# Patient Record
Sex: FEMALE | Race: WHITE | Employment: FULL TIME | URBAN - METROPOLITAN AREA
[De-identification: names, ages, dates, MRNs, and addresses within clinical notes are randomized per-mention and may not be internally consistent; named-entity substitution may affect disease eponyms.]

---

## 2017-08-14 ENCOUNTER — TELEPHONE (OUTPATIENT)
Dept: OBGYN CLINIC | Age: 32
End: 2017-08-14

## 2017-08-28 ENCOUNTER — OFFICE VISIT (OUTPATIENT)
Dept: OBGYN CLINIC | Age: 32
End: 2017-08-28

## 2017-08-28 VITALS
BODY MASS INDEX: 44.1 KG/M2 | DIASTOLIC BLOOD PRESSURE: 70 MMHG | WEIGHT: 281 LBS | HEIGHT: 67 IN | SYSTOLIC BLOOD PRESSURE: 118 MMHG

## 2017-08-28 DIAGNOSIS — Z32.01 POSITIVE PREGNANCY TEST: ICD-10-CM

## 2017-08-28 DIAGNOSIS — Z34.00 PRENATAL CARE OF PRIMIGRAVIDA, ANTEPARTUM: Primary | ICD-10-CM

## 2017-08-28 DIAGNOSIS — Z13.79 GENETIC TESTING: ICD-10-CM

## 2017-08-28 DIAGNOSIS — N92.6 MISSED MENSES: ICD-10-CM

## 2017-08-28 DIAGNOSIS — O99.210 OBESITY IN PREGNANCY: ICD-10-CM

## 2017-08-28 RX ORDER — PYRIDOXINE HCL (VITAMIN B6) 100 MG
100 TABLET ORAL DAILY
COMMUNITY
End: 2017-11-21

## 2017-08-28 RX ORDER — EPINEPHRINE 0.3 MG/.3ML
0.3 INJECTION SUBCUTANEOUS
COMMUNITY
End: 2017-12-22

## 2017-08-28 NOTE — PROGRESS NOTES
[facility]  [email or web address]  [phone]    Lena Francisco MD, [address]     Chief complaint: (NEW PATIENT)  Irregular cycles  Last cycle; Patient's last menstrual period was 2017 (exact date). This is a new concern and an evaluation is planned. Current pregnancy history:  Domenico Victoria is a , 29 y.o. female [facility]   She presents for the evaluation of irregular menses and a positive pregnancy test.    LMP history:  Patient's last menstrual period was 2017 (exact date). .  The date of the beginning of her last menstrual period is certain. Her menses are regular. Her cycles occur about every 8.3 weeks. A urine pregnancy test was positive about few weeks ago. She was not using contraception at the estimated time of conception. Based on her LMP, her EGA is 8 weeks,3 days with and EDC of 418. Ultrasound data:  She had an ultrasound today which revealed a viable cuello pregnancy with a gestational age of 10 weeks and 0 days giving an EDC of 18. TA ULTRASOUND PERFORMED. A SINGLE VIABLE 9W0D IUP IS SEEN WITH NORMAL CARDIAC RHYTHM. GESTATIONAL AGE BASED ON TODAYS US.  A NORMAL APPEARING YOLK [address] IS SEEN. RIGHT & LEFT OVARIES APPEAR WITHIN NORMAL LIMITS. NO FREE FLUID SEEN IN THE CDS. Pregnancy symptoms:  She reports \"morning sickness\". She denies frequent urination. Since she found out she is pregnant, she has there was no change in patient's weight. She reports her prepregnancy weight as 281 pounds. Relevant past pregnancy history:  She has the following pregnancy history:none. She does not have a history of  delivery. She does not have a history of a prior  section. 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     Substance history:   She does not report current tobacco use. She does not report current alcohol use. She does not report current drug use. Exposure history: There are not indoor cat(s) in the home. The patient was instructed not to change cat litter boxes during pregnancy. She does not report close contact with children on a regular basis. She has chicken pox or the vaccine in the past.   Patient does not report issues with domestic violence. 30  Genetic Screening/Teratology Counseling:   (Includes patient, baby's father, or anyone in either family with:)  3.  Patient's age >/= 28 at EDC?--32      FOB age: 29years old. 2.  Thalassemia ([facility], [place], [facility], or  background): MCV<80?--yes and Klaus   3. Neural tube defect (meningomyelocele, spina bifida, anencephaly)? --no  4. Congenital heart defect?--no  5. Down syndrome?--no  6. Jack-Sachs ([facility])? --no  7. Canavan's Disease?--no  8. Familial Dysautonomia?--no   9. Sickle cell disease or trait ()? --no   Has she been tested for sickle trait: No  10. Hemophilia or other blood disorders?--yes and hemochromatosis FOB grandfather paternal  6. Muscular dystrophy?--no  12. Cystic fibrosis? --no  13. [place]'s Chorea?--no  14. Mental retardation/autism (if yes was person tested for Fragile X)?-no  15. Other inherited genetic or chromosomal disorder?- no  16. Maternal metabolic disorder (DM, PKU, etc)? --no  17. Patient or FOB with a child with a birth defect not listed above?--no  17a. Patient or FOB with a birth defect themselves?--no  25. Recurrent pregnancy loss, or stillbirth?--no  19. Any medications since LMP other than prenatal vitamins (include vitamins, supplements, OTC meds, drugs, alcohol)? -- PNV, B6, unisom, claritin    Current Outpatient Prescriptions:     PRENATAL VITS W-CA,FE,FA,1 MG, (PRENATAL 1 + IRON PO), Take  by mouth., Disp: , Rfl:     doxylamine succinate (UNISOM) 25 mg tablet, Take 25 mg by mouth nightly as needed for Sleep., Disp: , Rfl:     pyridoxine, vitamin B6, (VITAMIN B-6) 100 mg tablet, Take 100 mg by mouth daily. , Disp: , Rfl:     EPINEPHrine (EPIPEN) 0.3 mg/0.3 mL injection, 0.3 mg by IntraMUSCular route once as needed. , Disp: , Rfl:     20. Any other genetic/environmental exposure to discuss?--no. Infection History:  1. Lives with someone with TB or TB exposed?--no  2. Patient or partner has history of genital herpes?--no  3. Rash or viral illness since LMP?--no  4. History of STD (GC, CT, HPV, syphilis, HIV)? --no  5. Have you received a flu vaccine for the most recent flu season? -- no  6. Have you or your sexual partner(s) travelled to a [facility] area in the last 6 months? -- no    Past Medical History:   Diagnosis Date    Routine Papanicolaou smear     Negative per pt. Past Surgical History:   Procedure Laterality Date    HX WISDOM TEETH EXTRACTION  2017     Social History     Occupational History    Not on file. Social History Main Topics    Smoking status: Never Smoker    Smokeless tobacco: Never Used      Comment: Never used vapor or e-cigs     Alcohol use No    Drug use: No    Sexual activity: Yes     Partners: Male     Birth control/ protection: None     Family History   Problem Relation Age of Onset    Colon Cancer Maternal Grandfather     Diabetes Paternal Grandmother      OB History    Para Term  AB Living   1        SAB TAB Ectopic Molar Multiple Live Births              # Outcome Date GA Lbr Antwan/2nd Weight Sex Delivery Anes PTL Lv   1 Current                 Allergies   Allergen Reactions    Clayton Anaphylaxis     Prior to Admission medications    Medication Sig Start Date End Date Taking? Authorizing Provider   PRENATAL VITS W-CA,FE,FA,1 MG, (PRENATAL 1 + IRON PO) Take  by mouth. Yes Historical Provider   doxylamine succinate (UNISOM) 25 mg tablet Take 25 mg by mouth nightly as needed for Sleep. Yes Historical Provider   pyridoxine, vitamin B6, (VITAMIN B-6) 100 mg tablet Take 100 mg by mouth daily. Yes Historical Provider   EPINEPHrine (EPIPEN) 0.3 mg/0.3 mL injection 0.3 mg by IntraMUSCular route once as needed.    Yes Historical Provider        Review of Systems - History obtained from the patient  Constitutional: negative for weight loss, fever, night sweats  HEENT: negative for hearing loss, earache, congestion, snoring, sorethroat  CV: negative for chest pain, palpitations, edema  Resp: negative for cough, shortness of breath, wheezing  GI: negative for change in bowel habits, abdominal pain, black or bloody stools  : negative for frequency, dysuria, hematuria, vaginal discharge  MSK: negative for back pain, joint pain, muscle pain  Breast: negative for breast lumps, nipple discharge, galactorrhea  Skin :negative for itching, rash, hives  Neuro: negative for dizziness, headache, confusion, weakness  Psych: negative for anxiety, depression, change in mood  Heme/lymph: negative for bleeding, bruising, pallor    Objective:  Visit Vitals    /70    Ht 5' 7\" (1.702 m)    Wt 281 lb (127.5 kg)    LMP 06/30/2017 (Exact Date)    BMI 44.01 kg/m2       Physical Exam:   Constitutional  · Appearance: well-nourished, well developed, alert, in no acute distress    HENT  · Head  · Face: appears normal  · Eyes: appear normal  · Ears: normal  · Mouth: normal  · Lips: no lesions    Neck  · Inspection/Palpation: normal appearance, no masses or tenderness  · Lymph Nodes: no lymphadenopathy present  · Thyroid: gland size normal, nontender, no nodules or masses present on palpation    Chest  · Respiratory Effort: breathing unlabored  · Auscultation: normal breath sounds    Cardiovascular  · Heart:  · Auscultation: regular rate and rhythm without murmur    Breasts  · Inspection of Breasts: breasts symmetrical, no skin changes, no discharge present, nipple appearance normal, no skin retraction present  · Palpation of Breasts and Axillae: no masses present on palpation, no breast tenderness  · Axillary Lymph Nodes: no lymphadenopathy present    Gastrointestinal  · Abdominal Examination: abdomen non-tender to palpation, normal bowel sounds, no masses present  · Liver and spleen: no hepatomegaly present, spleen not palpable  · Hernias: no hernias identified    Genitourinary  · External Genitalia: normal appearance for age, no discharge present, no tenderness present, no inflammatory lesions present, no masses present, no atrophy present  · Vagina: normal vaginal vault without central or paravaginal defects, no discharge present, no inflammatory lesions present, no masses present  · Bladder: non-tender to palpation  · Urethra: appears normal  · Cervix: normal appearing with discharge or lesions, os closed  · Uterus: enlarged, normal shape, soft  · Adnexa: no adnexal tenderness present, no adnexal masses present  · Perineum: perineum within normal limits, no evidence of trauma, no rashes or skin lesions present  · Anus: anus within normal limits, no hemorrhoids present  · Inguinal Lymph Nodes: no lymphadenopathy present    Skin  · General Inspection: no rash, no lesions identified    Neurologic/Psychiatric  · Mental Status:  · Orientation: grossly oriented to person, place and time  · Mood and Affect: mood normal, affect appropriate    Assessment:   Irregular cycles  Encounter Diagnoses   Name Primary?  Positive pregnancy test     Missed menses     Genetic testing     Obesity in pregnancy     Prenatal care of primigravida, antepartum Yes   FH hemochromatosis  Due date: LMP    Plan:   We discussed options of genetic screening and diagnostic testing including:  CF testing, CVS, amniocentesis first trimester screening/NT, MSAFP, and NIPT (handout given to patient for review and consent)  She is interested in prenatal genetic testing of her fetus.  She is considering NT: but will notify us if decides to cancel this test  Plan: NT  The course of pregnancy discussed including visit schedule, ultrasounds, lab testing, etc.  Pt advised to avoid alcoholic beverages and illicit/recreational drugs use  Recommend taking prenatal vitamins or folic acid daily with DHA/fish oil. The hospital and practice style discussed with coverage system. We discussed nutrition, toxoplasmosis precautions, sexual activity, exercise, need for influenza vaccine, environmental and work hazards, travel advice, screen for domestic violence, need for seat belts. We discussed seafood, unpasteurized dairy products, deli meat, artificial sweeteners, and caffeine intake. We recommend avoiding chemical and toxin exposures when possible. Information on prenatal and breastfeeding classes given. Information on circumcision given  Patient encouraged not to smoke. Discussed current prescription drug use. Given medication list.  Discussed the use of over the counter medications and chemicals. She is advised to contact her MD with any questions. Pt understands risk of hemorrhage during pregnancy and post delivery and would accept blood products if necessary in life-threatening emergencies  We discussed signs and symptoms of abnormal pregnancies and miscarriage. Handouts given to pt. Early glucola    Physician review of ultrasound performed by technician  Today's ultrasound report and images were reviewed and discussed with the patient. Please see images and imaging report entered by technician in PACS for more detail and progress note and diagnosis entered by MD.    Sawyer Gayle MD    Orders Placed This Encounter    CULTURE, URINE    HEP B SURFACE AG    HIV SCREEN, [address] [place] [address].  W/REFLEX CONFIRM    CBC W/O DIFF    RUBELLA AB, IGG    T PALLIDUM SCREEN W/REFLEX    CYSTIC FIBROSIS MUTATION 80    HEMOGLOBIN FRACTIONATION    REFERRAL TO MATERNAL FETAL MEDICINE    TYPE, ABO & RH    ANTIBODY SCREEN    PRENATAL VITS W-CA,FE,FA,1 MG, (PRENATAL 1 + IRON PO)    doxylamine succinate (UNISOM) 25 mg tablet    pyridoxine, vitamin B6, (VITAMIN B-6) 100 mg tablet    EPINEPHrine (EPIPEN) 0.3 mg/0.3 mL injection    PAP IG, CT-NG, HPV 16&18,45(242314, [id number])     Follow-up Disposition:  Return in about 4 weeks (around 9/25/2017) for Follow up OB visit.

## 2017-08-28 NOTE — PATIENT INSTRUCTIONS
Weeks 6 to 10 of Your Pregnancy: Care Instructions  Your Care Instructions    Congratulations on your pregnancy. This is an exciting and important time for you. During the first 6 to 10 weeks of your pregnancy, your body goes through many changes. Your baby grows very fast, even though you cannot feel it yet. You may start to notice that you feel different, both in your body and your emotions. Because each woman's pregnancy is unique, there is no right way to feel. You may feel the healthiest you have ever been, or you may feel tired or sick to your stomach (\"morning sickness\"). These early weeks are a time to make healthy choices and to eat the best foods for you and your baby. This care sheet will give you some ideas. This is also a good time to think about birth defects testing. These are tests done during pregnancy to look for possible problems with the baby. First trimester tests for birth defects can be done between 8 and 17 weeks of pregnancy, depending on the test. Talk with your doctor about what kinds of tests are available. Follow-up care is a key part of your treatment and safety. Be sure to make and go to all appointments, and call your doctor if you are having problems. It's also a good idea to know your test results and keep a list of the medicines you take. How can you care for yourself at home? Eat well  · Eat at least 3 meals and 2 healthy snacks every day. Eat fresh, whole foods, including:  ¨ 7 or more servings of bread, tortillas, cereal, rice, pasta, or oatmeal.  ¨ 3 or more servings of vegetables, especially leafy green vegetables. ¨ 2 or more servings of fruits. ¨ 3 or more servings of milk, yogurt, or cheese. ¨ 2 or more servings of meat, turkey, chicken, fish, eggs, or dried beans. · Drink plenty of fluids, especially water. Avoid sodas and other sweetened drinks. · Choose foods that have important vitamins for your baby, such as calcium, iron, and folate.   ¨ Dairy products, tofu, canned fish with bones, almonds, broccoli, dark leafy greens, corn tortillas, and fortified orange juice are good sources of calcium. ¨ Beef, poultry, liver, spinach, lentils, dried beans, fortified cereals, and dried fruits are rich in iron. ¨ Dark leafy greens, broccoli, asparagus, liver, fortified cereals, orange juice, peanuts, and almonds are good sources of folate. · Avoid foods that could harm your baby. ¨ Do not eat raw or undercooked meat, chicken, or fish (such as sushi or raw oysters). ¨ Do not eat raw eggs or foods that contain raw eggs, such as Caesar dressing. ¨ Do not eat soft cheeses and unpasteurized dairy foods, such as Brie, feta, or blue cheese. ¨ Do not eat fish that contains a lot of mercury, such as shark, swordfish, tilefish, or franc mackerel. Do not eat more than 6 ounces of tuna each week. ¨ Do not eat raw sprouts, especially alfalfa sprouts. ¨ Cut down on caffeine, such as coffee, tea, and cola. Protect yourself and your baby  · Do not touch daniel litter or cat feces. They can cause an infection that could harm your baby. · High body temperature can be harmful to your baby. So if you want to use a sauna or hot tub, be sure to talk to your doctor about how to use it safely. [place] with morning sickness  · Sip small amounts of water, juices, or shakes. Try drinking between meals, not with meals. · Eat 5 or 6 small meals a day. Try dry toast or crackers when you first get up, and eat breakfast a little later. · Avoid spicy, greasy, and fatty foods. · When you feel sick, open your windows or go for a short walk to get fresh air. · Try nausea wristbands. These help some women. · Tell your doctor if you think your prenatal vitamins make you sick. Where can you learn more? Go to http://rachid.info/. Enter G112 in the search box to learn more about \"Weeks 6 to 10 of Your Pregnancy: Care Instructions. \"  Current as of: March 16, 2017  Content Version: 11.3  © 5527-9661 [employer]. Care instructions adapted under license by [employer] (which disclaims liability or warranty for this information). If you have questions about a medical condition or this instruction, always ask your healthcare professional. [address] any warranty or liability for your use of this information. Learning About Pregnancy  Your Care Instructions  Your health in the early weeks of your pregnancy is particularly important for your babys health. Take good care of yourself. Anything you do that harms your body can also harm your baby. Make sure to go to all of your doctor appointments. Regular checkups will help keep you and your baby healthy. Follow-up care is a key part of your treatment and safety. Be sure to make and go to all appointments, and call your doctor if you are having problems. Its also a good idea to know your test results and keep a list of the medicines you take. How can you care for yourself at home? Diet  · Eat a balanced diet. Make sure your diet includes plenty of beans, peas, and leafy green vegetables. · Do not skip meals or go for many hours without eating. If you are nauseated, try to eat a small, healthy snack every 2 to 3 hours. · Do not eat fish that has a high level of mercury, such as shark, swordfish, or mackerel. Do not eat more than one can of tuna each week. · Drink plenty of fluids, enough so that your urine is light yellow or clear like water. If you have kidney, heart, or liver disease and have to limit fluids, talk with your doctor before you increase the amount of fluids you drink. · Cut down on caffeine, such as coffee, tea, and cola. · Do not drink alcohol, such as beer, wine, or hard liquor. · Take a multivitamin that contains at least 400 micrograms (mcg) of folic acid to help prevent birth defects.  Fortified cereal and whole wheat bread are good additional sources of folic acid. · Increase the calcium in your diet. Try to drink a quart of skim milk each day. You may also take calcium supplements and choose foods such as cheese and yogurt. Lifestyle  · Make sure you go to your follow-up appointments. · Get plenty of rest. You may be unusually tired while you are pregnant. · Get at least 30 minutes of exercise on most days of the week. Walking is a good choice. If you have not exercised in the past, start out slowly. Take several short walks each day. · Do not smoke. If you need help quitting, talk to your doctor about stop-smoking programs. These can increase your chances of quitting for good. · Do not touch cat feces or litter boxes. Also, wash your hands after you handle raw meat, and fully cook all meat before you eat it. Wear gloves when you work in the yard or garden, and wash your hands well when you are done. Cat feces, raw or undercooked meat, and contaminated dirt can cause an infection that may harm your baby or lead to a miscarriage. · Do not use saunas or hot tubs. Raising your body temperature may harm your baby. · Avoid chemical fumes, paint fumes, or poisons. · Do not use illegal drugs or alcohol. Medicines  · Review all of your medicines with your doctor. Some of your routine medicines may need to be changed to protect your baby. · Use acetaminophen (Tylenol) to relieve minor problems, such as a mild headache or backache or a mild fever with cold symptoms. Do not use nonsteroidal anti-inflammatory drugs (NSAIDs), such as ibuprofen (Advil, Motrin) or naproxen (Aleve), unless your doctor says it is okay. · Do not take two or more pain medicines at the same time unless the doctor told you to. Many pain medicines have acetaminophen, which is Tylenol. Too much acetaminophen (Tylenol) can be harmful. · Take your medicines exactly as prescribed. Call your doctor if you think you are having a problem with your medicine.   To manage morning sickness  · If you feel sick when you first wake up, try eating a small snack (such as crackers) before you get out of bed. Allow some time to digest the snack, and then get out of bed slowly. · Do not skip meals or go for long periods without eating. An empty stomach can make nausea worse. · Eat small, frequent meals instead of three large meals each day. · Drink plenty of fluids. Sports drinks, such as Gatorade or Powerade, are good choices. · Eat foods that are high in protein but low in fat. · If you are taking iron supplements, ask your doctor if they are necessary. Iron can make nausea worse. · Avoid any smells, such as coffee, that make you feel sick. · Get lots of rest. Morning sickness may be worse when you are tired. Where can you learn more? Go to http://chris-danette.info/. Enter T483 in the search box to learn more about \"Learning About Pregnancy. \"  Current as of: March 16, 2017  Content Version: 11.3  © 3538-5220 [employer]. Care instructions adapted under license by [employer] (which disclaims liability or warranty for this information). If you have questions about a medical condition or this instruction, always ask your healthcare professional. [address] any warranty or liability for your use of this information.

## 2017-08-28 NOTE — MR AVS SNAPSHOT
Visit Information Date & Time Provider Department Dept. Phone Encounter #  
 8/28/2017  2:00 PM Glenys Garcia MD [facility] [phone] Upcoming Health Maintenance Date Due  
 PAP AKA CERVICAL CYTOLOGY 8/18/2008 INFLUENZA AGE 9 TO ADULT 8/1/2017 Allergies as of 8/28/2017  Review Complete On: 8/28/2017 By: Glenys Garcia MD  
  
 Severity Noted Reaction Type Reactions [place] 08/28/2017    Anaphylaxis Current Immunizations  Never Reviewed No immunizations on file. Not reviewed this visit You Were Diagnosed With   
  
 Codes Comments Positive pregnancy test    -  Primary ICD-10-CM: Z32.01 
ICD-9-CM: V72.42 Missed menses     ICD-10-CM: N92.6 ICD-9-CM: 626.4 Genetic testing     ICD-10-CM: Z13.79 ICD-9-CM: V82.79 Vitals Height(growth percentile) Weight(growth percentile) LMP BMI OB Status Smoking Status 5' 7\" (1.702 m) 281 lb (127.5 kg) 06/30/2017 (Exact Date) 44.01 kg/m2 Pregnant Never Smoker BMI and BSA Data Body Mass Index Body Surface Area 44.01 kg/m 2 2.46 m 2 Your Updated Medication List  
  
   
This list is accurate as of: 8/28/17  2:13 PM.  Always use your most recent med list.  
  
  
  
  
 doxylamine succinate 25 mg tablet Commonly known as:  Joseph Pond Take 25 mg by mouth nightly as needed for Sleep. EPINEPHrine 0.3 mg/0.3 mL injection Commonly known as:  EPIPEN  
0.3 mg by IntraMUSCular route once as needed. PRENATAL 1 + IRON PO Take  by mouth. VITAMIN B-6 100 mg tablet Generic drug:  pyridoxine (vitamin B6) Take 100 mg by mouth daily. We Performed the Following ANTIBODY SCREEN [id number] CPT(R)] CBC W/O DIFF [91854 CPT(R)] CULTURE, URINE [id number] CPT(R)] CYSTIC FIBROSIS MUTATION 97 [[id number] Custom] HEP B SURFACE AG G8081860 CPT(R)] HIV 1/2 AG/AB, 4TH GENERATION,W RFLX CONFIRM [[id number] Custom] PAP IG, CT-NG, HPV 16&18,45(457970, [id number]) [[id number] Custom] REFERRAL TO MATERNAL FETAL MEDICINE [VQA926 Custom] RUBELLA AB, IGG [id number] CPT(R)] T PALLIDUM SCREEN W/REFLEX [[id number] Custom] TYPE, ABO & RH [34652 CPT(R)] Referral Information Referral ID Referred By Referred To  
  
 [id number] Monserrat Rand MD   
   [address] [facility] [address] [place] [address] Phone: [phone] Fax: [phone] Visits Status Start Date End Date 1 New Request 8/28/17 8/28/18 If your referral has a status of pending review or denied, additional information will be sent to support the outcome of this decision. Patient Instructions Weeks 6 to 10 of Your Pregnancy: Care Instructions Your Care Instructions Congratulations on your pregnancy. This is an exciting and important time for you. During the first 6 to 10 weeks of your pregnancy, your body goes through many changes. Your baby grows very fast, even though you cannot feel it yet. You may start to notice that you feel different, both in your body and your emotions. Because each woman's pregnancy is unique, there is no right way to feel. You may feel the healthiest you have ever been, or you may feel tired or sick to your stomach (\"morning sickness\"). These early weeks are a time to make healthy choices and to eat the best foods for you and your baby. This care sheet will give you some ideas. This is also a good time to think about birth defects testing. These are tests done during pregnancy to look for possible problems with the baby. First trimester tests for birth defects can be done between 8 and 17 weeks of pregnancy, depending on the test. Talk with your doctor about what kinds of tests are available. Follow-up care is a key part of your treatment and safety.  Be sure to make and go to all appointments, and call your doctor if you are having problems. It's also a good idea to know your test results and keep a list of the medicines you take. How can you care for yourself at home? Eat well · Eat at least 3 meals and 2 healthy snacks every day. Eat fresh, whole foods, including: ¨ 7 or more servings of bread, tortillas, cereal, rice, pasta, or oatmeal. 
¨ 3 or more servings of vegetables, especially leafy green vegetables. ¨ 2 or more servings of fruits. ¨ 3 or more servings of milk, yogurt, or cheese. ¨ 2 or more servings of meat, turkey, chicken, fish, eggs, or dried beans. · Drink plenty of fluids, especially water. Avoid sodas and other sweetened drinks. · Choose foods that have important vitamins for your baby, such as calcium, iron, and folate. ¨ Dairy products, tofu, canned fish with bones, almonds, broccoli, dark leafy greens, corn tortillas, and fortified orange juice are good sources of calcium. ¨ Beef, poultry, liver, spinach, lentils, dried beans, fortified cereals, and dried fruits are rich in iron. ¨ Dark leafy greens, broccoli, asparagus, liver, fortified cereals, orange juice, peanuts, and almonds are good sources of folate. · Avoid foods that could harm your baby. ¨ Do not eat raw or undercooked meat, chicken, or fish (such as sushi or raw oysters). ¨ Do not eat raw eggs or foods that contain raw eggs, such as Caesar dressing. ¨ Do not eat soft cheeses and unpasteurized dairy foods, such as Brie, feta, or blue cheese. ¨ Do not eat fish that contains a lot of mercury, such as shark, swordfish, tilefish, or franc mackerel. Do not eat more than 6 ounces of tuna each week. ¨ Do not eat raw sprouts, especially alfalfa sprouts. ¨ Cut down on caffeine, such as coffee, tea, and cola. Protect yourself and your baby · Do not touch daniel litter or cat feces. They can cause an infection that could harm your baby. · High body temperature can be harmful to your baby.  So if you want to use a sauna or hot tub, be sure to talk to your doctor about how to use it safely. [place] with morning sickness · Sip small amounts of water, juices, or shakes. Try drinking between meals, not with meals. · Eat 5 or 6 small meals a day. Try dry toast or crackers when you first get up, and eat breakfast a little later. · Avoid spicy, greasy, and fatty foods. · When you feel sick, open your windows or go for a short walk to get fresh air. · Try nausea wristbands. These help some women. · Tell your doctor if you think your prenatal vitamins make you sick. Where can you learn more? Go to http://chris-danette.info/. Enter G112 in the search box to learn more about \"Weeks 6 to 10 of Your Pregnancy: Care Instructions. \" Current as of: March 16, 2017 Content Version: 11.3 © 7210-2754 Rocket.La. Care instructions adapted under license by [employer]rowd (which disclaims liability or warranty for this information). If you have questions about a medical condition or this instruction, always ask your healthcare professional. [place] [address] any warranty or liability for your use of this information. Learning About Pregnancy Your Care Instructions Your health in the early weeks of your pregnancy is particularly important for your babys health. Take good care of yourself. Anything you do that harms your body can also harm your baby. Make sure to go to all of your doctor appointments. Regular checkups will help keep you and your baby healthy. Follow-up care is a key part of your treatment and safety. Be sure to make and go to all appointments, and call your doctor if you are having problems. Its also a good idea to know your test results and keep a list of the medicines you take. How can you care for yourself at home? Diet · Eat a balanced diet. Make sure your diet includes plenty of beans, peas, and leafy green vegetables. · Do not skip meals or go for many hours without eating. If you are nauseated, try to eat a small, healthy snack every 2 to 3 hours. · Do not eat fish that has a high level of mercury, such as shark, swordfish, or mackerel. Do not eat more than one can of tuna each week. · Drink plenty of fluids, enough so that your urine is light yellow or clear like water. If you have kidney, heart, or liver disease and have to limit fluids, talk with your doctor before you increase the amount of fluids you drink. · Cut down on caffeine, such as coffee, tea, and cola. · Do not drink alcohol, such as beer, wine, or hard liquor. · Take a multivitamin that contains at least 400 micrograms (mcg) of folic acid to help prevent birth defects. Fortified cereal and whole wheat bread are good additional sources of folic acid. · Increase the calcium in your diet. Try to drink a quart of skim milk each day. You may also take calcium supplements and choose foods such as cheese and yogurt. Lifestyle · Make sure you go to your follow-up appointments. · Get plenty of rest. You may be unusually tired while you are pregnant. · Get at least 30 minutes of exercise on most days of the week. Walking is a good choice. If you have not exercised in the past, start out slowly. Take several short walks each day. · Do not smoke. If you need help quitting, talk to your doctor about stop-smoking programs. These can increase your chances of quitting for good. · Do not touch cat feces or litter boxes. Also, wash your hands after you handle raw meat, and fully cook all meat before you eat it. Wear gloves when you work in the yard or garden, and wash your hands well when you are done. Cat feces, raw or undercooked meat, and contaminated dirt can cause an infection that may harm your baby or lead to a miscarriage. · Do not use saunas or hot tubs. Raising your body temperature may harm your baby. · Avoid chemical fumes, paint fumes, or poisons. · Do not use illegal drugs or alcohol. Medicines · Review all of your medicines with your doctor. Some of your routine medicines may need to be changed to protect your baby. · Use acetaminophen (Tylenol) to relieve minor problems, such as a mild headache or backache or a mild fever with cold symptoms. Do not use nonsteroidal anti-inflammatory drugs (NSAIDs), such as ibuprofen (Advil, Motrin) or naproxen (Aleve), unless your doctor says it is okay. · Do not take two or more pain medicines at the same time unless the doctor told you to. Many pain medicines have acetaminophen, which is Tylenol. Too much acetaminophen (Tylenol) can be harmful. · Take your medicines exactly as prescribed. Call your doctor if you think you are having a problem with your medicine. To manage morning sickness · If you feel sick when you first wake up, try eating a small snack (such as crackers) before you get out of bed. Allow some time to digest the snack, and then get out of bed slowly. · Do not skip meals or go for long periods without eating. An empty stomach can make nausea worse. · Eat small, frequent meals instead of three large meals each day. · Drink plenty of fluids. Sports drinks, such as Gatorade or Powerade, are good choices. · Eat foods that are high in protein but low in fat. · If you are taking iron supplements, ask your doctor if they are necessary. Iron can make nausea worse. · Avoid any smells, such as coffee, that make you feel sick. · Get lots of rest. Morning sickness may be worse when you are tired. Where can you learn more? Go to http://chris-danette.info/. Enter V044 in the search box to learn more about \"Learning About Pregnancy. \" Current as of: March 16, 2017 Content Version: 11.3 © 5166-5432 Itandi, Heroes2u.  Care instructions adapted under license by [employer] (which disclaims liability or warranty for this information). If you have questions about a medical condition or this instruction, always ask your healthcare professional. [address] any warranty or liability for your use of this information. Introducing [facility] SERVICES! [employer] introduces [employer] patient portal. Now you can access parts of your medical record, email your doctor's office, and request medication refills online. 1. In your internet browser, go to [email or web address] 2. Click on the First Time User? Click Here link in the Sign In box. You will see the New Member Sign Up page. 3. Enter your [employer] Access Code exactly as it appears below. You will not need to use this code after youve completed the sign-up process. If you do not sign up before the expiration date, you must request a new code. · [employer] Access Code: [email or web address] Expires: 9/20/2017  3:35 PM 
 
4. Enter the last four digits of your Social Security Number (xxxx) and Date of Birth (mm/dd/yyyy) as indicated and click Submit. You will be taken to the next sign-up page. 5. Create a [employer] ID. This will be your [employer] login ID and cannot be changed, so think of one that is secure and easy to remember. 6. Create a [employer] password. You can change your password at any time. 7. Enter your Password Reset Question and Answer. This can be used at a later time if you forget your password. 8. Enter your e-mail address. You will receive e-mail notification when new information is available in [address] 9. Click Sign Up. You can now view and download portions of your medical record. 10. Click the Download Summary menu link to download a portable copy of your medical information. If you have questions, please visit the Frequently Asked Questions section of the [employer] website. Remember, [employer] is NOT to be used for urgent needs. For medical emergencies, dial 911. Now available from your iPhone and Android! Please provide this summary of care documentation to your next provider. Your primary care clinician is listed as NOT ON FILE. If you have any questions after today's visit, please call [phone].

## 2017-08-30 ENCOUNTER — PATIENT MESSAGE (OUTPATIENT)
Dept: OBGYN CLINIC | Age: 32
End: 2017-08-30

## 2017-08-30 RX ORDER — DOXYLAMINE SUCCINATE AND PYRIDOXINE HYDROCHLORIDE, DELAYED RELEASE TABLETS 10 MG/10 MG 10; 10 MG/1; MG/1
2 TABLET, DELAYED RELEASE ORAL
Qty: 100 TAB | Refills: 0 | Status: SHIPPED | OUTPATIENT
Start: 2017-08-30 | End: 2017-12-22

## 2017-08-30 NOTE — TELEPHONE ENCOUNTER
From: Jeffry Quiñones  To: Yesi Lock MD  Sent: 8/30/2017 11:01 AM EDT  Subject: Prescription Question    In my appointment on Monday, we discussed getting a prescription for morning sickness. Can we make that happen? The past few days have been progressively worse.

## 2017-08-31 LAB
C TRACH RRNA CVX QL NAA+PROBE: NEGATIVE
CYTOLOGIST CVX/VAG CYTO: NORMAL
CYTOLOGY CVX/VAG DOC THIN PREP: NORMAL
CYTOLOGY HISTORY:: NORMAL
DX ICD CODE: NORMAL
HPV I/H RISK 1 DNA CVX QL PROBE+SIG AMP: NEGATIVE
Lab: NORMAL
N GONORRHOEA RRNA CVX QL NAA+PROBE: NEGATIVE
OTHER STN SPEC: NORMAL
PATH REPORT.FINAL DX SPEC: NORMAL
STAT OF ADQ CVX/VAG CYTO-IMP: NORMAL

## 2017-08-31 RX ORDER — DOXYLAMINE SUCCINATE AND PYRIDOXINE HYDROCHLORIDE, DELAYED RELEASE TABLETS 10 MG/10 MG 10; 10 MG/1; MG/1
2 TABLET, DELAYED RELEASE ORAL
Qty: 120 TAB | Refills: 0 | Status: SHIPPED | OUTPATIENT
Start: 2017-08-31 | End: 2017-10-10 | Stop reason: SDUPTHER

## 2017-09-06 LAB
ABO GROUP BLD: NORMAL
ANTIBODY SCREEN, EXTERNAL: NEGATIVE
BLD GP AB SCN SERPL QL: NEGATIVE
CFTR MUT ANL BLD/T: NORMAL
CHLAMYDIA, EXTERNAL: NEGATIVE
CYSTIC FIBROSIS, EXTERNAL: NORMAL
ERYTHROCYTE [DISTWIDTH] IN BLOOD BY AUTOMATED COUNT: 13.6 % (ref 12.3–15.4)
GENE DIS ANL CARRIER INTERP-IMP: NORMAL
HBSAG, EXTERNAL: NEGATIVE
HBV SURFACE AG SERPL QL IA: NEGATIVE
HCT VFR BLD AUTO: 40.6 % (ref 34–46.6)
HCT, EXTERNAL: 40.6
HGB A MFR BLD: 97.8 % (ref 94–98)
HGB A2 MFR BLD COLUMN CHROM: 2.2 % (ref 0.7–3.1)
HGB BLD-MCNC: 13.8 G/DL (ref 11.1–15.9)
HGB C MFR BLD: 0 %
HGB EVAL, EXTERNAL: NORMAL
HGB F MFR BLD: 0 % (ref 0–2)
HGB FRACT BLD-IMP: NORMAL
HGB S BLD QL SOLY: NEGATIVE
HGB S MFR BLD: 0 %
HGB, EXTERNAL: 13.8
HIV 1+2 AB+HIV1 P24 AG SERPL QL IA: NON REACTIVE
HIV, EXTERNAL: NON REACTIVE
MCH RBC QN AUTO: 33.4 PG (ref 26.6–33)
MCHC RBC AUTO-ENTMCNC: 34 G/DL (ref 31.5–35.7)
MCV RBC AUTO: 98 FL (ref 79–97)
N. GONORRHEA, EXTERNAL: NEGATIVE
PAP SMEAR, EXTERNAL: NORMAL
PLATELET # BLD AUTO: 364 X10E3/UL (ref 150–379)
PLATELET CNT,   EXTERNAL: 364
RBC # BLD AUTO: 4.13 X10E6/UL (ref 3.77–5.28)
RH BLD: POSITIVE
RUBELLA, EXTERNAL: NORMAL
RUBV IGG SERPL IA-ACNC: 3.21 INDEX
T PALLIDUM AB SER QL IA: NEGATIVE
T. PALLIDUM, EXTERNAL: NEGATIVE
TYPE, ABO & RH, EXTERNAL: NORMAL
URINALYSIS, EXTERNAL: NORMAL
WBC # BLD AUTO: 11.1 X10E3/UL (ref 3.4–10.8)

## 2017-09-11 ENCOUNTER — LAB ONLY (OUTPATIENT)
Dept: OBGYN CLINIC | Age: 32
End: 2017-09-11

## 2017-09-11 DIAGNOSIS — O99.210 OBESITY AFFECTING PREGNANCY: ICD-10-CM

## 2017-09-11 DIAGNOSIS — Z34.00 PRENATAL CARE OF PRIMIGRAVIDA, ANTEPARTUM: Primary | ICD-10-CM

## 2017-09-14 LAB
# FETUSES US: 1
1ST TRIMESTER SCN+NT PATIENT-IMP: NORMAL
AGE AT DELIVERY: NORMAL YEARS
COMMENTS, 017532: NORMAL
FET CRL US.MEAS: NORMAL MM
FET NUCHAL FOLD MOM THICKNESS US.MEAS: NORMAL
FET NUCHAL FOLD THICKNESS US.MEAS: NORMAL MM
FET TS 18 RISK FROM MAT AGE: NORMAL
FET TS 21 RISK FROM MAT AGE: NORMAL
GA: NORMAL WEEKS
GLUCOSE 1H P 50 G GLC PO SERPL-MCNC: 133 MG/DL (ref 65–139)
HCG ADJ MOM SERPL: NORMAL
HCG SERPL-ACNC: 132.1 IU/ML
INHIBIN A ADJ MOM SERPL: NORMAL
INHIBIN A SERPL-MCNC: 369.2 PG/ML
NOTE, 018271: NORMAL
PAPP-A MOM, 017516: NORMAL
PAPP-A SERPL-MCNC: 82.1 NG/ML
RESULTS, 017501: NORMAL
SONOGRAPHER: NORMAL
TS 18 RISK FETUS: NORMAL
TS 21 RISK FETUS: NORMAL
US DATE: NORMAL

## 2017-09-14 NOTE — PROGRESS NOTES
Normal results, add to prenatal records. We can review in detail with patient at next visit.   10w6d  FU on fist tri results when able

## 2017-09-22 LAB — BACTERIA UR CULT: NORMAL

## 2017-10-10 ENCOUNTER — HOSPITAL ENCOUNTER (OUTPATIENT)
Dept: PERINATAL CARE | Age: 32
Discharge: HOME | End: 2017-10-10
Attending: OBSTETRICS & GYNECOLOGY
Payer: COMMERCIAL

## 2017-10-10 ENCOUNTER — ROUTINE PRENATAL (OUTPATIENT)
Dept: OBGYN CLINIC | Age: 32
End: 2017-10-10

## 2017-10-10 VITALS — SYSTOLIC BLOOD PRESSURE: 112 MMHG | WEIGHT: 281.8 LBS | BODY MASS INDEX: 44.14 KG/M2 | DIASTOLIC BLOOD PRESSURE: 68 MMHG

## 2017-10-10 DIAGNOSIS — O99.210 OBESITY IN PREGNANCY: ICD-10-CM

## 2017-10-10 DIAGNOSIS — Z34.00 PRENATAL CARE OF PRIMIGRAVIDA, ANTEPARTUM: Primary | ICD-10-CM

## 2017-10-10 DIAGNOSIS — Z23 ENCOUNTER FOR IMMUNIZATION: ICD-10-CM

## 2017-10-10 PROCEDURE — 76815 OB US LIMITED FETUS(S): CPT | Performed by: OBSTETRICS & GYNECOLOGY

## 2017-10-10 RX ORDER — DOXYLAMINE SUCCINATE AND PYRIDOXINE HYDROCHLORIDE, DELAYED RELEASE TABLETS 10 MG/10 MG 10; 10 MG/1; MG/1
2 TABLET, DELAYED RELEASE ORAL
Qty: 120 TAB | Refills: 0 | Status: SHIPPED | OUTPATIENT
Start: 2017-10-10 | End: 2017-11-21

## 2017-10-10 NOTE — PATIENT INSTRUCTIONS

## 2017-10-10 NOTE — PROGRESS NOTES
_ [facility]  [email or web address]  [phone]    Nikunj Verduzco MD, FACOG     Follow-up OB visit    Chief Complaint   Patient presents with    Routine Prenatal Visit       Vitals:    10/10/17 1514   BP: 112/68   Weight: 281 lb 12.8 oz (127.8 kg)       Patient Active Problem List    Diagnosis Date Noted    Prenatal care of primigravida, antepartum 08/28/2017       The patient reports the following concerns: She requested a refill for Diclegis. See PN flowsheet for exam    29 y.o. Lit Brilliant 14w4d   Encounter Diagnoses   Name Primary?  Encounter for immunization     Prenatal care of primigravida, antepartum Yes    Obesity in pregnancy      Disc safer meds for n/v in pregnancy  rf diclegis  rtc next week for TS: unable to calculate based on NT  Disc option of NIPS: but disc high risk indications     [] SAB/bleeding precautions reviewed   [] PTL/PPROM precautions reviewed   [] Labor precautions reviewed   [] Fetal kick counts discussed   [] Labs reviewed with patient   [] Ebony Cash precautions reviewed   [] Consent reviewed   [] Handouts given to pt   [] Glucola handout    [] GBS/labor/Magic Hour handout   []    []    []    []    Follow-up Disposition:  Return in about 4 weeks (around 11/7/2017) for Follow up OB visit.     Orders Placed This Encounter    CT IMMUNIZ ADMIN,1 SINGLE/COMB VAC/TOXOID    INFLUENZA VIRUS VACCINE QUADRIVALENT, PRESERVATIVE FREE SYRINGE (01922)    doxylamine-pyridoxine, vit B6, (DICLEGIS) 10-10 mg TbEC DR susan Verduzco MD

## 2017-10-10 NOTE — MR AVS SNAPSHOT
Visit Information Date & Time Provider Department Dept. Phone Encounter #  
 10/10/2017  2:40 PM Krystal Hickman MD [facility] 90 [id number] Upcoming Health Maintenance Date Due INFLUENZA AGE 9 TO ADULT 8/1/2017 PAP AKA CERVICAL CYTOLOGY 8/28/2020 Allergies as of 10/10/2017  Review Complete On: 10/10/2017 By: Meghan New LPN Severity Noted Reaction Type Reactions [place] 08/28/2017    Anaphylaxis Current Immunizations  Never Reviewed Name Date Influenza Vaccine (Quad) PF 10/10/2017 Not reviewed this visit You Were Diagnosed With   
  
 Codes Comments Encounter for immunization    -  Primary ICD-10-CM: X79 ICD-9-CM: V03.89 Vitals BP Weight(growth percentile) LMP BMI OB Status Smoking Status 112/68 281 lb 12.8 oz (127.8 kg) 06/30/2017 (Exact Date) 44.14 kg/m2 Pregnant Never Smoker BMI and BSA Data Body Mass Index Body Surface Area  
 44.14 kg/m 2 2.46 m 2 Preferred Pharmacy Pharmacy Name Phone [facility] [place] [phone] Your Updated Medication List  
  
   
This list is accurate as of: 10/10/17  3:35 PM.  Always use your most recent med list.  
  
  
  
  
 doxylamine succinate 25 mg tablet Commonly known as:  Demetrice Rideau Take 25 mg by mouth nightly as needed for Sleep. * doxylamine-pyridoxine (vit B6) 10-10 mg Tbec DR tablet Commonly known as:  Greenfield Gaines Take 2 Tabs by mouth nightly. add one in am after 3d prn, add one in pm after 6d prn. Max 4/day * doxylamine-pyridoxine (vit B6) 10-10 mg Tbec DR tablet Commonly known as:  [place] Gaines Take 2 Tabs by mouth nightly. add one in am after 3d prn, add one in pm after 6d prn. Max 4/day EPINEPHrine 0.3 mg/0.3 mL injection Commonly known as:  EPIPEN  
0.3 mg by IntraMUSCular route once as needed.   
  
 PRENATAL 1 + IRON PO  
 Take  by mouth. VITAMIN B-6 100 mg tablet Generic drug:  pyridoxine (vitamin B6) Take 100 mg by mouth daily. * Notice: This list has 2 medication(s) that are the same as other medications prescribed for you. Read the directions carefully, and ask your doctor or other care provider to review them with you. We Performed the Following INFLUENZA VIRUS VAC QUAD,SPLIT,PRESV FREE SYRINGE IM [id number] CPT(R)] IA IMMUNIZ ADMIN,1 SINGLE/COMB VAC/TOXOID [id number] CPT(R)] Patient Instructions Weeks 14 to 18 of Your Pregnancy: Care Instructions Your Care Instructions During this time, you may start to \"show,\" so that you look pregnant to people around you. You may also notice some changes in your skin, such as itchy spots on your palms or acne on your face. Your baby is now able to pass urine, and your baby's first stool (meconium) is starting to collect in his or her intestines. Hair is also beginning to grow on your baby's head. At your next visit, between weeks 18 and 20, your doctor may do an ultrasound test. The test allows your doctor to check for certain problems. Your doctor can also tell the sex of your baby. This is a good time to think about whether you want to know whether your baby is a boy or a girl. Talk to your doctor about getting a flu shot to help keep you healthy during your pregnancy. As your pregnancy moves along, it is common to worry or feel anxious. Your body is changing a lot. And you are thinking about giving birth, the health of your baby, and becoming a parent. You can learn to cope with any anxiety and stress you feel. Follow-up care is a key part of your treatment and safety. Be sure to make and go to all appointments, and call your doctor if you are having problems. It's also a good idea to know your test results and keep a list of the medicines you take. How can you care for yourself at home? Reduce stress · Ask for help with cooking and housekeeping. · Figure out who or what causes your stress. Avoid these people or situations as much as possible. · Relax every day. Taking 10- to 15-minute breaks can make a big difference. Take a walk, listen to music, or take a warm bath. · Learn relaxation techniques at prenatal or yoga class. Or buy a relaxation tape. · List your fears about having a baby and becoming a parent. Share the list with someone you trust. Decide which worries are really small, and try to let them go. Exercise · If you did not exercise much before pregnancy, start slowly. Walking is best. [place] yourself, and do a little more every day. · Brisk walking, easy jogging, low-impact aerobics, water aerobics, and yoga are good choices. Some sports, such as scuba diving, horseback riding, downhill skiing, gymnastics, and water skiing, are not a good idea. · Try to do at least 2½ hours a week of moderate exercise, such as a fast walk. One way to do this is to be active 30 minutes a day, at least 5 days a week. It's fine to be active in blocks of 10 minutes or more throughout your day and week. · Wear loose clothing. And wear shoes and a bra that provide good support. · Warm up and cool down to start and finish your exercise. · If you want to use weights, be sure to use light weights. They reduce stress on your joints. Stay at the best weight for you · Experts recommend that you gain about 1 pound a month during the first 3 months of your pregnancy. · Experts recommend that you gain about 1 pound a week during your last 6 months of pregnancy, for a total weight gain of 25 to 35 pounds. · If you are underweight, you will need to gain more weight (about 28 to 40 pounds). · If you are overweight, you may not need to gain as much weight (about 15 to 25 pounds). · If you are gaining weight too fast, use common sense. Exercise every day, and limit sweets, fast foods, and fats.  Choose lean meats, fruits, and vegetables. · If you are having twins or more, your doctor may refer you to a dietitian. Where can you learn more? Go to http://chris-danette.info/. Enter W353 in the search box to learn more about \"Weeks 14 to 18 of Your Pregnancy: Care Instructions. \" Current as of: March 16, 2017 Content Version: 11.3 © 1978-7925 Carnegie Speech. Care instructions adapted under license by Fyber (which disclaims liability or warranty for this information). If you have questions about a medical condition or this instruction, always ask your healthcare professional. [place] [address] any warranty or liability for your use of this information. Introducing [facility] & HEALTH SERVICES! Dear Teresa Patiño: Thank you for requesting a [employer] account. Our records indicate that you have previously registered for a [employer] account but its currently inactive. Please call our [employer] support line at [phone]. Additional Information If you have questions, please visit the Frequently Asked Questions section of the [employer] website at [email or web address] Remember, [employer] is NOT to be used for urgent needs. For medical emergencies, dial 911. Now available from your iPhone and Android! Please provide this summary of care documentation to your next provider. Your primary care clinician is listed as NOT ON FILE. If you have any questions after today's visit, please call [phone].

## 2017-10-17 ENCOUNTER — LAB ONLY (OUTPATIENT)
Dept: OBGYN CLINIC | Age: 32
End: 2017-10-17

## 2017-10-17 DIAGNOSIS — Z34.00 PRENATAL CARE OF PRIMIGRAVIDA, ANTEPARTUM: Primary | ICD-10-CM

## 2017-10-19 LAB
AFP ADJ MOM SERPL: 1.4
AFP INTERP SERPL-IMP: NORMAL
AFP INTERP SERPL-IMP: NORMAL
AFP SERPL-MCNC: 27.3 NG/ML
AFP, MATERNAL, EXTERNAL: NORMAL
AGE AT DELIVERY: 30.6 YEARS
COMMENT, 01827: NORMAL
GA METHOD: NORMAL
GA: 15 WEEKS
IDDM PATIENT QL: NO
MULTIPLE PREGNANCY: NO
NEURAL TUBE DEFECT RISK FETUS: 3600 %
RESULTS, 017004: NORMAL

## 2017-11-21 ENCOUNTER — ROUTINE PRENATAL (OUTPATIENT)
Dept: OBGYN CLINIC | Age: 32
End: 2017-11-21

## 2017-11-21 ENCOUNTER — HOSPITAL ENCOUNTER (OUTPATIENT)
Dept: PERINATAL CARE | Age: 32
Discharge: HOME | End: 2017-11-21
Attending: OBSTETRICS & GYNECOLOGY
Payer: COMMERCIAL

## 2017-11-21 VITALS — SYSTOLIC BLOOD PRESSURE: 122 MMHG | DIASTOLIC BLOOD PRESSURE: 80 MMHG | BODY MASS INDEX: 44.06 KG/M2 | WEIGHT: 281.3 LBS

## 2017-11-21 DIAGNOSIS — Z34.00 PRENATAL CARE OF PRIMIGRAVIDA, ANTEPARTUM: Primary | ICD-10-CM

## 2017-11-21 DIAGNOSIS — O99.210 OBESITY IN PREGNANCY: ICD-10-CM

## 2017-11-21 PROCEDURE — 76811 OB US DETAILED SNGL FETUS: CPT | Performed by: OBSTETRICS & GYNECOLOGY

## 2017-11-21 NOTE — PROGRESS NOTES
No complaints. 20 week ultrasound today, having girl.      _ Brandon Harry  [email or web address]  [phone]    Darryle Silvius, MD, FACOG     Follow-up OB visit    Chief Complaint   Patient presents with    Routine Prenatal Visit       Vitals:    11/21/17 1516   BP: 122/80   Weight: 281 lb 4.8 oz (127.6 kg)       Patient Active Problem List    Diagnosis Date Noted    Prenatal care of primigravida, antepartum 08/28/2017       The patient reports the following concerns: none    See PN flowsheet for exam    29 y.o. Marta Zapata 20w4d   Encounter Diagnoses   Name Primary?  Prenatal care of primigravida, antepartum Yes    Obesity in pregnancy      FS today  XX     [] SAB/bleeding precautions reviewed   [] PTL/PPROM precautions reviewed   [] Labor precautions reviewed   [] Fetal kick counts discussed   [] Labs reviewed with patient   [] Joe Afsaneh precautions reviewed   [] Consent reviewed   [] Handouts given to pt   [] Glucola handout    [] GBS/labor/Magic Hour handout   []    []    []    []    Follow-up Disposition:  Return in about 4 weeks (around 12/19/2017) for Follow up OB visit. No orders of the defined types were placed in this encounter.       Darryle Silvius, MD

## 2017-11-21 NOTE — MR AVS SNAPSHOT
Visit Information Date & Time Provider Department Dept. Phone Encounter #  
 11/21/2017  2:40 PM Jocelyn Epps MD [facility] [id number] Upcoming Health Maintenance Date Due  
 PAP AKA CERVICAL CYTOLOGY 8/28/2020 Allergies as of 11/21/2017  Review Complete On: 11/21/2017 By: Jocelyn Epps MD  
  
 Severity Noted Reaction Type Reactions Wol[place] High 08/28/2017    Anaphylaxis Current Immunizations  Never Reviewed Name Date Influenza Vaccine (Quad) PF 10/10/2017 Not reviewed this visit Vitals BP Weight(growth percentile) LMP BMI OB Status Smoking Status 122/80 281 lb 4.8 oz (127.6 kg) 06/30/2017 (Exact Date) 44.06 kg/m2 Pregnant Never Smoker BMI and BSA Data Body Mass Index Body Surface Area 44.06 kg/m 2 2.46 m 2 Preferred Pharmacy Pharmacy Name Phone [id number], [address] [place] [phone] Your Updated Medication List  
  
   
This list is accurate as of: 11/21/17  3:23 PM.  Always use your most recent med list.  
  
  
  
  
 doxylamine-pyridoxine (vit B6) 10-10 mg Tbec DR tablet Commonly known as:  Ike Ly Take 2 Tabs by mouth nightly. add one in am after 3d prn, add one in pm after 6d prn. Max 4/day EPINEPHrine 0.3 mg/0.3 mL injection Commonly known as:  EPIPEN  
0.3 mg by IntraMUSCular route once as needed. PRENATAL 1 + IRON PO Take  by mouth. Patient Instructions Weeks 18 to 22 of Your Pregnancy: Care Instructions Your Care Instructions Your baby is continuing to develop quickly. At this stage, babies can now suck their thumbs,  firmly with their hands, and open and close their eyelids. Sometime between 18 and 22 weeks, you will start to feel your baby move.  At first, these small fetal movements feel like fluttering or \"butterflies. \" Some women say that they feel like gas bubbles. As the baby grows, these movements will become stronger. You may also notice that your baby kicks and hiccups. During this time, you may find that your nausea and fatigue are gone. Overall, you may feel better and have more energy than you did in your first trimester. But you may also have new discomforts now, such as sleep problems or leg cramps. This care sheet can help you ease these discomforts. Follow-up care is a key part of your treatment and safety. Be sure to make and go to all appointments, and call your doctor if you are having problems. It's also a good idea to know your test results and keep a list of the medicines you take. How can you care for yourself at home? Ease sleep problems · Avoid caffeine in drinks or chocolate late in the day. · Get some exercise every day. · Take a warm shower or bath before bed. · Have a light snack or glass of milk at bedtime. · Do relaxation exercises in bed to calm your mind and body. · Support your legs and back with extra pillows. Try a pillow between your legs if you sleep on your side. · Do not use sleeping pills or alcohol. They could harm your baby. Ease leg cramps · Do not massage your calf during the cramp. · Sit on a firm bed or chair. Straighten your leg, and bend your foot (flex your ankle) slowly upward, toward your knee. Bend your toes up and down. · Stand on a cool, flat surface. Stretch your toes upward, and take small steps walking on your heels. · Use a heating pad or hot water bottle to help with muscle ache. Prevent leg cramps · Be sure to get enough calcium. If you are worried that you are not getting enough, talk to your doctor. · Exercise every day, and stretch your legs before bed. · Take a warm bath before bed, and try leg warmers at night. Where can you learn more? Go to http://chris-danette.info/. Enter I688 in the search box to learn more about \"Weeks 18 to 22 of Your Pregnancy: Care Instructions. \" Current as of: March 16, 2017 Content Version: 11.4 © 5069-4217 [facility]. Care instructions adapted under license by [employer] (which disclaims liability or warranty for this information). If you have questions about a medical condition or this instruction, always ask your healthcare professional. [place] [address] any warranty or liability for your use of this information. Introducing [facility]! Dear Ryan Koenig: Thank you for requesting a KeepRecipes account. Our records indicate that you already have an active KeepRecipes account. You can access your account anytime at [email or web address] Did you know that you can access your hospital and ER discharge instructions at any time in KeepRecipes? You can also review all of your test results from your hospital stay or ER visit. Additional Information If you have questions, please visit the Frequently Asked Questions section of the KeepRecipes website at [email or web address] Remember, KeepRecipes is NOT to be used for urgent needs. For medical emergencies, dial 911. Now available from your iPhone and Android! Please provide this summary of care documentation to your next provider. Your primary care clinician is listed as PROVIDER UNKNOWN. If you have any questions after today's visit, please call [phone].

## 2017-11-21 NOTE — PATIENT INSTRUCTIONS

## 2017-12-12 ENCOUNTER — PATIENT MESSAGE (OUTPATIENT)
Dept: OBGYN CLINIC | Age: 32
End: 2017-12-12

## 2017-12-12 NOTE — TELEPHONE ENCOUNTER
Patient calling about an appointment to be seen. Per Dr. Ira Brown nurse patient placed on the schedule to be seen at 11:00am on 12/14/17. Patient verbalized understanding.

## 2017-12-14 ENCOUNTER — ROUTINE PRENATAL (OUTPATIENT)
Dept: OBGYN CLINIC | Age: 32
End: 2017-12-14

## 2017-12-14 VITALS
WEIGHT: 283 LBS | BODY MASS INDEX: 44.42 KG/M2 | DIASTOLIC BLOOD PRESSURE: 78 MMHG | SYSTOLIC BLOOD PRESSURE: 120 MMHG | HEIGHT: 67 IN

## 2017-12-14 DIAGNOSIS — R10.2 PELVIC PAIN DURING PREGNANCY: Primary | ICD-10-CM

## 2017-12-14 DIAGNOSIS — Z3A.23 23 WEEKS GESTATION OF PREGNANCY: ICD-10-CM

## 2017-12-14 DIAGNOSIS — O26.899 PELVIC PAIN DURING PREGNANCY: Primary | ICD-10-CM

## 2017-12-14 LAB — URINALYSIS, EXTERNAL: NORMAL

## 2017-12-14 NOTE — PROGRESS NOTES
[address] [facility]  [email or web address]  [phone]    Chaya Joseph MD, FACOG       OB/GYN: Alee Garcia Problem visit    Chief Complaint:   Chief Complaint   Patient presents with    Pregnancy Problem    Pelvic Pain       Patient Active Problem List    Diagnosis    Prenatal care of primigravida, antepartum     Fh hemochromatosis: FOB GF: FOB plans to be tested, rec d/w pediatrician prn  ? NT: unable to calculate: plan TS  Pt will rtc for lab only  Obesity: early glucola: #133, serial US at 32 wks q 4 wk  FH Klaus: hgb electro: AA         History of Present Illness: The patient is a 29 y.o.  female who reports having pelvic and vaginal pain and pressure for 5 days. This is a new problem. This is not a routinely scheduled OB appointment. She reports the symptoms are is unchanged. Aggravating factors include none. Denies recent intercourse, constipation or urinary issues. Alleviating factors include none. She does not have other concerns. PFSH:  Past Medical History:   Diagnosis Date    Pap smear for cervical cancer screening 2017    Negative, HPV negative    Routine Papanicolaou smear 2016    Negative per pt. Past Surgical History:   Procedure Laterality Date    HX WISDOM TEETH EXTRACTION  2017     Family History   Problem Relation Age of Onset    Colon Cancer Maternal Grandfather     Diabetes Paternal Grandmother      Social History   Substance Use Topics    Smoking status: Never Smoker    Smokeless tobacco: Never Used      Comment: Never used vapor or e-cigs     Alcohol use No     Allergies   Allergen Reactions    Royce Anaphylaxis     Current Outpatient Prescriptions   Medication Sig    doxylamine-pyridoxine (DICLEGIS) 10-10 mg TbEC Take 2 Tabs by mouth nightly. add one in am after 3d prn, add one in pm after 6d prn. Max 4/day    PRENATAL VITS W-CA,FE,FA,1 MG, (PRENATAL 1 + IRON PO) Take  by mouth.     EPINEPHrine (EPIPEN) 0.3 mg/0.3 mL injection 0.3 mg by IntraMUSCular route once as needed. No current facility-administered medications for this visit. Review of Systems:  History obtained from the patient and written ROS questionnaire  Constitutional: negative for fevers, chills and weight loss  ENT ROS: negative for - hearing change, oral lesions or visual changes  Respiratory: negative for cough, wheezing or dyspnea on exertion  Cardiovascular: negative for chest pain, irregular heart beats, exertional chest pressure/discomfort  Gastrointestinal: negative for dysphagia, nausea and vomiting  Genito-Urinary ROS: no dysuria, trouble voiding, or hematuria, see HPI  Inteument/breast: negative for rash, breast lump and nipple discharge  Musculoskeletal:negative for stiff joints, neck pain and muscle weakness  Endocrine ROS: negative for - breast changes, galactorrhea or temperature intolerance  Hematological and Lymphatic ROS: negative for - blood clots, bruising or swollen lymph nodes    Physical Exam:  Visit Vitals    /78    Ht 5' 7\" (1.702 m)    Wt 283 lb (128.4 kg)    BMI 44.32 kg/m2       GENERAL: alert, well appearing, and in no distress  HEAD; normocephalic, atraumatic  ABDOMEN: soft, nontender, nondistended, no masses or organomegaly   BACK: normal range of motion, no tenderness, no CVAT   EGBUS: no lesions, no inflammation, no masses  VULVA: normal appearing vulva with no masses, tenderness or lesions  CERVIX: normal appearing cervix without discharge or lesions, non tender, closed  UTERUS: uterus is enlarged in size, gravid appropriate for gestational age  ADNEXA: normal adnexa in size, nontender and no masses  NEURO: alert, oriented, normal speech    See PN flowsheet for additional notes and exam    Assessment:  29 y.o.  23w6d   Encounter Diagnoses   Name Primary?  Pelvic pain during pregnancy Yes    23 weeks gestation of pregnancy        Plan:  An evaluation of this patient's concern is planned.   The patient is advised that she should contact the office if she does not note improvement or if symptoms recur  She should contact our office with any questions or concerns  She could keep her routine OB appointment. PTL prec  Support belt  PT referral  Fu if NI  Disc ptl sx vs discomforts of IUP vs infection    Orders Placed This Encounter    CULTURE, URINE    REFERRAL TO PHYSICAL THERAPY       No results found for this visit on 12/14/17.     Myrla Runner, MD

## 2017-12-14 NOTE — MR AVS SNAPSHOT
Visit Information Date & Time Provider Department Dept. Phone Encounter #  
 12/14/2017 11:00 AM MD Brandon Vences [phone] [id number]  
  
 12/22/2017  9:40 AM  
[facility] with MD Brandon [facility] Appt Note: 4wk fob TP  
 [address] [place] [address]  
[phone]  
  
   
 [address] [place] [address]  
  
    
 1/18/2018  8:40 AM  
OB VISIT with MD Brandon Vences ([facility] Appt Note: 4wk fob w/ glucola TP  
 [address] [place] [address]  
[phone] Upcoming Health Maintenance Date Due  
 PAP AKA CERVICAL CYTOLOGY 8/28/2020 Allergies as of 12/14/2017  Review Complete On: 11/21/2017 By: Faizan Clarke MD  
  
 Severity Noted Reaction Type Reactions Royce High 08/28/2017    Anaphylaxis Current Immunizations  Never Reviewed Name Date Influenza Vaccine (Quad) PF 10/10/2017 Not reviewed this visit Vitals BP Height(growth percentile) Weight(growth percentile) LMP BMI OB Status 120/78 5' 7\" (1.702 m) 283 lb (128.4 kg) 06/30/2017 (Exact Date) 44.32 kg/m2 Pregnant Smoking Status Never Smoker BMI and BSA Data Body Mass Index Body Surface Area 44.32 kg/m 2 2.46 m 2 Preferred Pharmacy Pharmacy Name Phone [id number], [address] [place] [phone] Your Updated Medication List  
  
   
This list is accurate as of: 12/14/17 11:33 AM.  Always use your most recent med list.  
  
  
  
  
 doxylamine-pyridoxine (vit B6) 10-10 mg Tbec DR tablet Commonly known as:  Geovanny Rob Take 2 Tabs by mouth nightly. add one in am after 3d prn, add one in pm after 6d prn. Max 4/day EPINEPHrine 0.3 mg/0.3 mL injection Commonly known as:  Genesis Neri  
 0.3 mg by IntraMUSCular route once as needed. PRENATAL 1 + IRON PO Take  by mouth. Patient Instructions Weeks 22 to 26 of Your Pregnancy: Care Instructions Your Care Instructions As you enter your 7th month of pregnancy at week 26, your baby's lungs are growing stronger and getting ready to breathe. You may notice that your baby responds to the sound of your or your partner's voice. You may also notice that your baby does less turning and twisting and more squirming or jerking. Jerking often means that your baby has the hiccups. Hiccups are perfectly normal and are only temporary. You may want to think about attending a childbirth preparation class. This is also a good time to start thinking about whether you want to have pain medicine during labor. Most pregnant women are tested for gestational diabetes between weeks 25 and 28. Gestational diabetes occurs when your blood sugar level gets too high when you're pregnant. The test is important, because you can have gestational diabetes and not know it. But the condition can cause problems for your baby. Follow-up care is a key part of your treatment and safety. Be sure to make and go to all appointments, and call your doctor if you are having problems. It's also a good idea to know your test results and keep a list of the medicines you take. How can you care for yourself at home? Ease discomfort from your baby's kicking · Change your position. Sometimes this will cause your baby to change position too. · Take a deep breath while you raise your arm over your head. Then breathe out while you drop your arm. Do Kegel exercises to prevent urine from leaking · You can do Kegel exercises while you stand or sit. ¨ Squeeze the same muscles you would use to stop your urine. Your belly and thighs should not move. ¨ Hold the squeeze for 3 seconds, and then relax for 3 seconds. ¨ Start with 3 seconds. Then add 1 second each week until you are able to squeeze for 10 seconds. ¨ Repeat the exercise 10 to 15 times for each session. Do three or more sessions each day. Ease or reduce swelling in your feet, ankles, hands, and fingers · If your fingers are puffy, take off your rings. · Do not eat high-salt foods, such as potato chips. · Prop up your feet on a stool or couch as much as possible. Sleep with pillows under your feet. · Do not stand for long periods of time or wear tight shoes. · Wear support stockings. Where can you learn more? Go to http://chris-danette.info/. Enter G264 in the search box to learn more about \"Weeks 22 to 26 of Your Pregnancy: Care Instructions. \" Current as of: March 16, 2017 Content Version: 11.4 © 1491-0832 [facility]. Care instructions adapted under license by [employer] (which disclaims liability or warranty for this information). If you have questions about a medical condition or this instruction, always ask your healthcare professional. [place] [address] any warranty or liability for your use of this information. Introducing [facility]! Dear Winsome Bishop: Thank you for requesting a Milk account. Our records indicate that you already have an active Milk account. You can access your account anytime at [email or web address] Did you know that you can access your hospital and ER discharge instructions at any time in Milk? You can also review all of your test results from your hospital stay or ER visit. Additional Information If you have questions, please visit the Frequently Asked Questions section of the Milk website at [email or web address] Remember, Milk is NOT to be used for urgent needs. For medical emergencies, dial 911. Now available from your iPhone and Android! Please provide this summary of care documentation to your next provider. Your primary care clinician is listed as PROVIDER UNKNOWN. If you have any questions after today's visit, please call [phone].

## 2017-12-14 NOTE — PATIENT INSTRUCTIONS
Weeks 22 to 26 of Your Pregnancy: Care Instructions  Your Care Instructions    As you enter your 7th month of pregnancy at week 26, your baby's lungs are growing stronger and getting ready to breathe. You may notice that your baby responds to the sound of your or your partner's voice. You may also notice that your baby does less turning and twisting and more squirming or jerking. Jerking often means that your baby has the hiccups. Hiccups are perfectly normal and are only temporary. You may want to think about attending a childbirth preparation class. This is also a good time to start thinking about whether you want to have pain medicine during labor. Most pregnant women are tested for gestational diabetes between weeks 25 and 28. Gestational diabetes occurs when your blood sugar level gets too high when you're pregnant. The test is important, because you can have gestational diabetes and not know it. But the condition can cause problems for your baby. Follow-up care is a key part of your treatment and safety. Be sure to make and go to all appointments, and call your doctor if you are having problems. It's also a good idea to know your test results and keep a list of the medicines you take. How can you care for yourself at home? Ease discomfort from your baby's kicking  · Change your position. Sometimes this will cause your baby to change position too. · Take a deep breath while you raise your arm over your head. Then breathe out while you drop your arm. Do Kegel exercises to prevent urine from leaking  · You can do Kegel exercises while you stand or sit. ¨ Squeeze the same muscles you would use to stop your urine. Your belly and thighs should not move. ¨ Hold the squeeze for 3 seconds, and then relax for 3 seconds. ¨ Start with 3 seconds. Then add 1 second each week until you are able to squeeze for 10 seconds. ¨ Repeat the exercise 10 to 15 times for each session.  Do three or more sessions each day.  Ease or reduce swelling in your feet, ankles, hands, and fingers  · If your fingers are puffy, take off your rings. · Do not eat high-salt foods, such as potato chips. · Prop up your feet on a stool or couch as much as possible. Sleep with pillows under your feet. · Do not stand for long periods of time or wear tight shoes. · Wear support stockings. Where can you learn more? Go to http://chris-danette.info/. Enter G264 in the search box to learn more about \"Weeks 22 to 26 of Your Pregnancy: Care Instructions. \"  Current as of: March 16, 2017  Content Version: 11.4  © 1008-5658 [employer], [employer] System [employer]. Care instructions adapted under license by [employer] (which disclaims liability or warranty for this information). If you have questions about a medical condition or this instruction, always ask your healthcare professional. [place] [address] any warranty or liability for your use of this information.

## 2017-12-16 LAB — BACTERIA UR CULT: NORMAL

## 2017-12-21 ENCOUNTER — HOSPITAL ENCOUNTER (OUTPATIENT)
Dept: PHYSICAL THERAPY | Age: 32
Discharge: HOME | End: 2017-12-21
Payer: COMMERCIAL

## 2017-12-21 PROCEDURE — 97110 THERAPEUTIC EXERCISES: CPT | Performed by: PHYSICAL MEDICINE & REHABILITATION

## 2017-12-21 PROCEDURE — 97140 MANUAL THERAPY 1/> REGIONS: CPT | Performed by: PHYSICAL MEDICINE & REHABILITATION

## 2017-12-21 PROCEDURE — 97530 THERAPEUTIC ACTIVITIES: CPT | Performed by: PHYSICAL MEDICINE & REHABILITATION

## 2017-12-21 PROCEDURE — 97162 PT EVAL MOD COMPLEX 30 MIN: CPT | Performed by: PHYSICAL MEDICINE & REHABILITATION

## 2017-12-21 NOTE — PROGRESS NOTES
PT INITIAL EVALUATION NOTE 2-15    Patient Name: Elizabeth Mercedes  Date:2017  : 1985  [x]  Patient  Verified  Payor: BLUE CROSS / Plan: [facility] PPO / Product Type: PPO /    In time: 10:00   Out time: 11:00  Total Treatment Time (min): 60  Visit #: 1    Treatment Area: Other specified pregnancy related conditions, unspecified trimester [O26.899]  Pelvic and perineal pain [R10.2]    SUBJECTIVE  Pain Level (0-10 scale): 4/10  Any medication changes, allergies to medications, adverse drug reactions, diagnosis change, or new procedure performed?: [] No    [x] Yes (see summary sheet for update)  Subjective:     Patient is a 29year old female  25 weeks pregnant with new onset low abdominal/groin pain and vaginal pain 2 weeks ago of insidious onset. Abdominal/groin pain is made worse with transitional movements. Vaginal/perinal pain is described as heaviness/tenderness/ache and is worse after prolonged standing and at the end of the day. She reports she is active at work, walks a lot, she is doing prenatal yoga videos. She reports pain is improved with prenatal yoga. She is having some new onset of stress UI with coughing. She is having no constipation  No pain with IC    Stated functional limitations  Moving sit to stand increases vaginal wall pain  Rolling/positioning in bed increases abdominal pain  Moving in/out of her car.    Pain is worse at the end of the day      PLOF: no abdominal/groin pain  Mechanism of Injury: insidious  Previous Treatment/Compliance: no prior PT  PMHx/Surgical Hx: none  Work Hx: admin  Living Situation: with spouse  Pt Goals: no pain  Barriers: none  Motivation: good  Substance use: none   FABQ Score:   Cognition: A & O x 4      OBJECTIVE/EXAMINATION    Posture: Reduced lumbar lordosis, increased thoracic kyphosis, forward head   Other Observations: Obese  Gait and Functional Mobility: No gait compensation   Palpation: Chief complaint abdominal pain reproduced with palpation to ASIS and abdominal ms attachment R > L in supine. Abdominal ms wall pain is worse with head lift. Joint Mobility: Posterior L ASIS compared to R. Lumbar AROM:        R  L  Flexion    Full           Extension   Full           Side Bending   Full           Rotation   Full              Aberrant movements:  Painful arc: [] Yes  [x] No  Instability catch: [] Yes  [x] No  Difficulty returning from flexion: [] Yes  [x] No  Reversal of lumbopelvic rhythm: [] Yes  [x] No      LOWER QUARTER   MUSCLE STRENGTH  KEY       R  L  0 - No Contraction  L1, L2 Psoas  4+/5  4+/5      1 - Trace   L3 Quads  5/5  5/5      2 - Poor   L4 Tib Ant  5/5  5/5      3 - Fair    L5 EHL  5/5  5/5      4 - Good   S1 FHL  5/5  5/5      5 - Normal   S2 Hams  5/5  5/5              MMT:  Core strength: 3+/5   Hip abduction: 4/5   Hip extension: 4/5    Neurological: Sensation: Denies parasthesias or focal weakness  Special Tests:        Slump: (-)   Bernabe: Tight B hip flexors         Barbara: NT     SLR: 90deg bilaterally no reproduction of pain   RADHA: (-)  P!  With FADIR       Long Sit: NT    SI compression/ Distraction: (-)   Active SLR: (-)        Modality rationale: decrease edema, decrease inflammation and decrease pain to improve the patients ability to decrease pelvic pain with ADLs   Min Type Additional Details    [] Estim: []Att   []Unatt        []TENS instruct                  []IFC  []Premod   []NMES                     []Other:  []w/US   []w/ice   []w/heat  Position:  Location:    []  Traction: [] Cervical       []Lumbar                       [] Prone          []Supine                       []Intermittent   []Continuous Lbs:  [] before manual  [] after manual  []w/heat    []  Ultrasound: []Continuous   [] Pulsed at:                            []1MHz   []3MHz Location:  W/cm2:    []  Paraffin         Location:  []w/heat   10 [x]  Ice     []  Heat  []  Ice massage Position: Had patient sit on a gel ice pack for compression and cooling effect. -Had good relief with this. Location: Perineum/vulvar area    []  Laser  []  Other: Position:  Location:    []  Vasopneumatic Device Pressure:       [] lo [] med [] hi   Temperature:    [x] Skin assessment post-treatment:  [x]intact []redness- no adverse reaction    []redness - adverse reaction:     15 min Therapeutic Exercise:  [x] See flow sheet :  Positions to relieve perineal and abdominal wall pressure/pain. Rationale: increase ROM and decrease pain to improve the patients ability to decrease abdominal/perineal pain with ADLs    15 min Therapeutic Activity:  []  See flow sheet : Body mechanics instruction to relieve abdominal ms wall strain: car transfers, bed mobility, positions for pain relief. Rationale: improve coordination, increase proprioception and decrease strain on perineum/abdominal wall  to improve the patients ability to decrease pain with ADLs. 15 min Manual Therapy:  MET pub rami and L post innominate correction, abdominal taping technique to relieve abdominal ms wall pain. Rationale: decrease pain, increase tissue extensibility and decrease trigger points  to improve the patients ability to decrease pain with ADLs         With   [x] TE   [x] TA   [] neuro   [] other: Patient Education: [x] Review HEP    [] Progressed/Changed HEP based on:   [] positioning   [] body mechanics   [] transfers   [] heat/ice application    [] other:        Other Objective/Functional Measures: Pt had immediate relief with abdominal taping technique. She was able to move between supine and sit with no pain using log roll technique, she reported significant improvement in perineal pain with compression and ice gel pack.      Pain Level (0-10 scale) post treatment: 0/10    ASSESSMENT:  Patient 25 weeks pregnant with insidious onset low abdominal/groin pain with movement, dull ache pain at perineum and vaginal wall with prolonged standing, worse at the end of the day. Lumbar and hip movement testing was clear today, I was able to reproduce her chief complaint abdominal pain with palpation to muscle wall attachment at illiac crest bilaterally. She responded well to abdominal taping technique, we discussed abdominal pregnancy support, body mechanics to decrease strain on abdominal wall. Perineal heaviness improved with compression and cool pack, we reviewed positions to relieve pressure off pelvic floor, perineal compression techniques she can utilize at work. She will benefit from skilled PT services to address her limitations and achieve her functional goals as stated on the plan of care. I'll see her back in a week then once a week as needed.       [x]  See Plan of Brandon Adam, PT 12/21/2017  10:06 AM

## 2017-12-21 NOTE — PROGRESS NOTES
[address] [facility]  [place], [address]  Phone: [phone]  Fax: [phone]    Plan of Care/ Statement of Necessity for Physical Therapy Services 2-15    Patient name: Blane Alston  : 1985  Provider#: [id number]  Referral source: Elizabeth Calvillo MD      Medical/Treatment Diagnosis: Other specified pregnancy related conditions, unspecified trimester [O26.899]  Pelvic and perineal pain [R10.2]     Prior Hospitalization: see medical history     Comorbidities: 25 weeks pregnant, obesity  Prior Level of Function: no abdominal or pelvic pain  Medications: Verified on Patient Summary List    Start of Care: 17      Onset Date: 2 weeks ago      The Plan of Care and following information is based on the information from the initial evaluation. Assessment/ key information: Patient 25 weeks pregnant with insidious onset low abdominal/groin pain with movement, dull ache pain at perineum and vaginal wall with prolonged standing, worse at the end of the day. Lumbar and hip movement testing was clear today, I was able to reproduce her chief complaint abdominal pain with palpation to muscle wall attachment at illiac crest bilaterally. She responded well to abdominal taping technique, we discussed abdominal pregnancy support, body mechanics to decrease strain on abdominal wall. Perineal heaviness improved with compression and cool pack, we reviewed positions to relieve pressure off pelvic floor, perineal compression techniques she can utilize at work. She will benefit from skilled PT services to address her limitations and achieve her functional goals as stated on the plan of care. I'll see her back in a week then once a week as needed.         Evaluation Complexity History MEDIUM  Complexity : 1-2 comorbidities / personal factors will impact the outcome/ POC ; Examination MEDIUM Complexity : 3 Standardized tests and measures addressing body structure, function, activity limitation and / or participation in recreation  ;Presentation MEDIUM Complexity : Evolving with changing characteristics  ; Clinical Decision Making MEDIUM Complexity : FOTO score of 26-74  Overall Complexity Rating: MEDIUM    Problem List: pain affecting function, decrease ROM, decrease strength, decrease ADL/ functional abilitiies, decrease activity tolerance, decrease flexibility/ joint mobility and decrease transfer abilities   Treatment Plan may include any combination of the following: Therapeutic exercise, Therapeutic activities, Neuromuscular re-education, Physical agent/modality, Manual therapy, Patient education, Self Care training and Functional mobility training  Patient / Family readiness to learn indicated by: asking questions  Persons(s) to be included in education: patient (P)  Barriers to Learning/Limitations: None  Patient Goal (s): no pain with work and home activities  Patient Self Reported Health Status: excellent  Rehabilitation Potential: excellent    Short Term Goals: To be accomplished in 6 weeks:  Patient will be independent with a progressive home exercise program  Patient will be independent with lumbar and abdominal protection techniques  Patient will have no pain with palpation to abdominal wall   Patient will transition between sit and stand with no pain     Long Term Goals: To be accomplished in 12 weeks:  Patient will report 50% decrease in perineal/vaginal discomfort   Patient will have no pain with work activities. Frequency / Duration: Patient to be seen 1-2 times per week for 6-12 weeks.     Patient/ Caregiver education and instruction: self care, activity modification and exercises    [x]  Plan of care has been reviewed with RADHA Gallo PT, MSPT     12/21/2017 12:02 PM    ________________________________________________________________________    I certify that the above Therapy Services are being furnished while the patient is under my care. I agree with the treatment plan and certify that this therapy is necessary.     [de-identified] Signature:____________________  Date:____________Time: _________

## 2017-12-22 ENCOUNTER — ROUTINE PRENATAL (OUTPATIENT)
Dept: OBGYN CLINIC | Age: 32
End: 2017-12-22

## 2017-12-22 VITALS — DIASTOLIC BLOOD PRESSURE: 78 MMHG | SYSTOLIC BLOOD PRESSURE: 118 MMHG | WEIGHT: 284 LBS | BODY MASS INDEX: 44.48 KG/M2

## 2017-12-22 DIAGNOSIS — Z34.00 PRENATAL CARE OF PRIMIGRAVIDA, ANTEPARTUM: Primary | ICD-10-CM

## 2017-12-22 NOTE — PROGRESS NOTES
[facility]  [email or web address]  [phone]    Yeni Carter MD, FACOG     Follow-up OB visit    Chief Complaint   Patient presents with    Routine Prenatal Visit       Vitals:    12/22/17 0952   BP: 118/78   Weight: 284 lb (128.8 kg)       Patient Active Problem List    Diagnosis Date Noted    Prenatal care of primigravida, antepartum 08/28/2017       The patient reports the following concerns: none. See PN flowsheet for exam    29 y.o. Tiny Locket 25w0d   Encounter Diagnosis   Name Primary?  Prenatal care of primigravida, antepartum Yes        [] SAB/bleeding precautions reviewed   [x] PTL/PPROM precautions reviewed   [] Labor precautions reviewed   [] Fetal kick counts discussed   [] Labs reviewed with patient   [] Harden Senna precautions reviewed   [] Consent reviewed   [] Handouts given to pt   [] Glucola handout    [] GBS/labor/Magic Hour handout   []    []    []    []    Follow-up Disposition:  Return in about 4 weeks (around 1/19/2018) for Follow up OB visit. No orders of the defined types were placed in this encounter.       Yeni Carter MD

## 2017-12-22 NOTE — MR AVS SNAPSHOT
Visit Information Date & Time Provider Department Dept. Phone Encounter #  
 12/22/2017  9:40 AM MD Brandon Jefferson [phone] [id number]  
  
 1/18/2018  8:40 AM  
OB VISIT with MD Brandon Jefferson ([address]) Appt Note: 4wk fob w/ glucola TP  
 [address] [facility] [address] [facility] [phone]  
[facility] [address] Upcoming Health Maintenance Date Due  
 PAP AKA CERVICAL CYTOLOGY 8/28/2020 Allergies as of 12/22/2017  Review Complete On: 12/22/2017 By: Mitch Osler, RN Severity Noted Reaction Type Reactions [place] 08/28/2017    Anaphylaxis Current Immunizations  Never Reviewed Name Date Influenza Vaccine (Quad) PF 10/10/2017 Not reviewed this visit Vitals BP Weight(growth percentile) LMP BMI OB Status Smoking Status 118/78 284 lb (128.8 kg) 06/30/2017 (Exact Date) 44.48 kg/m2 Pregnant Never Smoker Vitals History BMI and BSA Data Body Mass Index Body Surface Area 44.48 kg/m 2 2.47 m 2 Preferred Pharmacy Pharmacy Name Phone [id number], [address] [phone] Your Updated Medication List  
  
   
This list is accurate as of: 12/22/17 10:06 AM.  Always use your most recent med list.  
  
  
  
  
 doxylamine-pyridoxine (vit B6) 10-10 mg Tbec DR tablet Commonly known as:  Ike Ly Take 2 Tabs by mouth nightly. add one in am after 3d prn, add one in pm after 6d prn. Max 4/day EPINEPHrine 0.3 mg/0.3 mL injection Commonly known as:  EPIPEN  
0.3 mg by IntraMUSCular route once as needed. PRENATAL 1 + IRON PO Take  by mouth.   
  
  
  
  
To-Do List   
 01/08/2018 1:00 PM  
  Appointment with Nena Lassiter PT at [facility] MIRIAM Feldman ([phone])  
  
 01/15/2018 12:00 PM  
 Appointment with Abdi Camacho PT at [facility] PT [address] ([phone])  
  
 01/29/2018 12:00 PM  
  Appointment with Abid Camacho PT at [facility] PT [address] ([phone]) Patient Instructions Weeks 22 to 26 of Your Pregnancy: Care Instructions Your Care Instructions As you enter your 7th month of pregnancy at week 26, your baby's lungs are growing stronger and getting ready to breathe. You may notice that your baby responds to the sound of your or your partner's voice. You may also notice that your baby does less turning and twisting and more squirming or jerking. Jerking often means that your baby has the hiccups. Hiccups are perfectly normal and are only temporary. You may want to think about attending a childbirth preparation class. This is also a good time to start thinking about whether you want to have pain medicine during labor. Most pregnant women are tested for gestational diabetes between weeks 25 and 28. Gestational diabetes occurs when your blood sugar level gets too high when you're pregnant. The test is important, because you can have gestational diabetes and not know it. But the condition can cause problems for your baby. Follow-up care is a key part of your treatment and safety. Be sure to make and go to all appointments, and call your doctor if you are having problems. It's also a good idea to know your test results and keep a list of the medicines you take. How can you care for yourself at home? Ease discomfort from your baby's kicking · Change your position. Sometimes this will cause your baby to change position too. · Take a deep breath while you raise your arm over your head. Then breathe out while you drop your arm. Do Kegel exercises to prevent urine from leaking · You can do Kegel exercises while you stand or sit. ¨ Squeeze the same muscles you would use to stop your urine. Your belly and thighs should not move. ¨ Hold the squeeze for 3 seconds, and then relax for 3 seconds. ¨ Start with 3 seconds. Then add 1 second each week until you are able to squeeze for 10 seconds. ¨ Repeat the exercise 10 to 15 times for each session. Do three or more sessions each day. Ease or reduce swelling in your feet, ankles, hands, and fingers · If your fingers are puffy, take off your rings. · Do not eat high-salt foods, such as potato chips. · Prop up your feet on a stool or couch as much as possible. Sleep with pillows under your feet. · Do not stand for long periods of time or wear tight shoes. · Wear support stockings. Where can you learn more? Go to http://chris-danette.info/. Enter G264 in the search box to learn more about \"Weeks 22 to 26 of Your Pregnancy: Care Instructions. \" Current as of: March 16, 2017 Content Version: 11.4 © 7428-9646 [facility]. Care instructions adapted under license by [employer] (which disclaims liability or warranty for this information). If you have questions about a medical condition or this instruction, always ask your healthcare professional. [address] [address] any warranty or liability for your use of this information. Introducing [facility]! Dear Ace Martell: Thank you for requesting a Oculeve account. Our records indicate that you already have an active Oculeve account. You can access your account anytime at [email or web address] Did you know that you can access your hospital and ER discharge instructions at any time in Oculeve? You can also review all of your test results from your hospital stay or ER visit. Additional Information If you have questions, please visit the Frequently Asked Questions section of the Oculeve website at [email or web address] Remember, Oculeve is NOT to be used for urgent needs. For medical emergencies, dial 911. Now available from your iPhone and Android! Please provide this summary of care documentation to your next provider. Your primary care clinician is listed as PROVIDER UNKNOWN. If you have any questions after today's visit, please call [phone].

## 2017-12-22 NOTE — PATIENT INSTRUCTIONS
Weeks 22 to 26 of Your Pregnancy: Care Instructions  Your Care Instructions    As you enter your 7th month of pregnancy at week 26, your baby's lungs are growing stronger and getting ready to breathe. You may notice that your baby responds to the sound of your or your partner's voice. You may also notice that your baby does less turning and twisting and more squirming or jerking. Jerking often means that your baby has the hiccups. Hiccups are perfectly normal and are only temporary. You may want to think about attending a childbirth preparation class. This is also a good time to start thinking about whether you want to have pain medicine during labor. Most pregnant women are tested for gestational diabetes between weeks 25 and 28. Gestational diabetes occurs when your blood sugar level gets too high when you're pregnant. The test is important, because you can have gestational diabetes and not know it. But the condition can cause problems for your baby. Follow-up care is a key part of your treatment and safety. Be sure to make and go to all appointments, and call your doctor if you are having problems. It's also a good idea to know your test results and keep a list of the medicines you take. How can you care for yourself at home? Ease discomfort from your baby's kicking  · Change your position. Sometimes this will cause your baby to change position too. · Take a deep breath while you raise your arm over your head. Then breathe out while you drop your arm. Do Kegel exercises to prevent urine from leaking  · You can do Kegel exercises while you stand or sit. ¨ Squeeze the same muscles you would use to stop your urine. Your belly and thighs should not move. ¨ Hold the squeeze for 3 seconds, and then relax for 3 seconds. ¨ Start with 3 seconds. Then add 1 second each week until you are able to squeeze for 10 seconds. ¨ Repeat the exercise 10 to 15 times for each session.  Do three or more sessions each day.  Ease or reduce swelling in your feet, ankles, hands, and fingers  · If your fingers are puffy, take off your rings. · Do not eat high-salt foods, such as potato chips. · Prop up your feet on a stool or couch as much as possible. Sleep with pillows under your feet. · Do not stand for long periods of time or wear tight shoes. · Wear support stockings. Where can you learn more? Go to http://chris-danette.info/. Enter G264 in the search box to learn more about \"Weeks 22 to 26 of Your Pregnancy: Care Instructions. \"  Current as of: March 16, 2017  Content Version: 11.4  © 4503-1339 Healthwise, [employer]). Care instructions adapted under license by [employer] (which disclaims liability or warranty for this information). If you have questions about a medical condition or this instruction, always ask your healthcare professional. [place] [address] any warranty or liability for your use of this information.

## 2017-12-28 ENCOUNTER — HOSPITAL ENCOUNTER (OUTPATIENT)
Dept: PERINATAL CARE | Age: 32
Discharge: HOME | End: 2017-12-28
Attending: OBSTETRICS & GYNECOLOGY
Payer: COMMERCIAL

## 2017-12-28 ENCOUNTER — TELEPHONE (OUTPATIENT)
Dept: OBGYN CLINIC | Age: 32
End: 2017-12-28

## 2017-12-28 DIAGNOSIS — Z34.83 ENCOUNTER FOR SUPERVISION OF OTHER NORMAL PREGNANCY IN THIRD TRIMESTER: Primary | ICD-10-CM

## 2017-12-28 PROCEDURE — 76816 OB US FOLLOW-UP PER FETUS: CPT | Performed by: OBSTETRICS & GYNECOLOGY

## 2017-12-28 NOTE — TELEPHONE ENCOUNTER
----- Message from Sotero Guzman sent at 2017  4:52 PM EST -----  Regardin N Reinaldo [facility]

## 2018-01-08 ENCOUNTER — HOSPITAL ENCOUNTER (OUTPATIENT)
Dept: PHYSICAL THERAPY | Age: 33
Discharge: HOME | End: 2018-01-08
Payer: COMMERCIAL

## 2018-01-08 PROCEDURE — 97110 THERAPEUTIC EXERCISES: CPT | Performed by: PHYSICAL MEDICINE & REHABILITATION

## 2018-01-08 PROCEDURE — 97530 THERAPEUTIC ACTIVITIES: CPT | Performed by: PHYSICAL MEDICINE & REHABILITATION

## 2018-01-08 NOTE — PROGRESS NOTES
PT DAILY TREATMENT NOTE 2-15    Patient Name: Jennifer Becerra  Date:2018  : 1985  [x]  Patient  Verified  Payor: BLUE CROSS / Plan: [employer] [id number] PPO / Product Type: PPO /    In time:1:00  Out time:2:00  Total Treatment Time (min): 60  Visit #: 2    Treatment Area: Other specified pregnancy related conditions, unspecified trimester [O26.899]  Pelvic and perineal pain [R10.2]    SUBJECTIVE  Pain Level (0-10 scale): 0/10  Any medication changes, allergies to medications, adverse drug reactions, diagnosis change, or new procedure performed?: [x] No    [] Yes (see summary sheet for update)  Subjective functional status/changes:   [] No changes reported  Perineal groin pain is improved with ADLs by at least 80%. She is using the cold compress and towel roll compress to the perineum which is helpful    She is having some abdominal pain with prolonged standing. Logroll technique is working to decrease her pain. Overall feeling pretty good. OBJECTIVE      60 min Therapeutic Exercise:  [] See flow sheet : TrAB and PFM training, core stability    Rationale: increase strength, improve coordination and increase proprioception to improve the patients ability to perform ADLs with decreased pain          With   [x] TE   [] TA   [] neuro   [] other: Patient Education: [x] Review HEP    [] Progressed/Changed HEP based on:   [] positioning   [] body mechanics   [] transfers   [] heat/ice application    [] other:      Other Objective/Functional Measures: Difficulty initiating TrAB, improved after treatment. Pain Level (0-10 scale) post treatment: 0/10    ASSESSMENT/Changes in Function:     Patient will continue to benefit from skilled PT services to address functional mobility deficits, address strength deficits, analyze and modify body mechanics/ergonomics and assess and modify postural abnormalities to attain remaining goals.      [x]  See Plan of Care  []  See progress note/recertification  []  See Discharge Summary         Progress towards goals / Updated goals:    Short Term Goals: To be accomplished in 6 weeks:  Patient will be independent with a progressive home exercise program   MET  Patient will be independent with lumbar and abdominal protection techniques  MET  Patient will have no pain with palpation to abdominal wall MET  Patient will transition between sit and stand with no pain MET     Long Term Goals: To be accomplished in 12 weeks:  Patient will report 50% decrease in perineal/vaginal discomfort MET  Patient will have no pain with work activities. MET    PLAN  [x]  Upgrade activities as tolerated     []  Continue plan of care  []  Update interventions per flow sheet       []  Discharge due to:_  [x]  Other: Patient is doing well, will continue HEP at home. She will return PRN. She is aware she can call with questions.       Colette Jones, PT, MSPT 1/8/2018  1:06 PM

## 2018-01-10 LAB — GTT, 1 HR, GLUCOLA, EXTERNAL: 120

## 2018-01-15 ENCOUNTER — APPOINTMENT (OUTPATIENT)
Dept: PHYSICAL THERAPY | Age: 33
End: 2018-01-15
Payer: COMMERCIAL

## 2018-01-18 ENCOUNTER — ROUTINE PRENATAL (OUTPATIENT)
Dept: OBGYN CLINIC | Age: 33
End: 2018-01-18

## 2018-01-18 VITALS
SYSTOLIC BLOOD PRESSURE: 120 MMHG | HEIGHT: 67 IN | DIASTOLIC BLOOD PRESSURE: 72 MMHG | BODY MASS INDEX: 45.2 KG/M2 | WEIGHT: 288 LBS

## 2018-01-18 DIAGNOSIS — Z34.00 PRENATAL CARE OF PRIMIGRAVIDA, ANTEPARTUM: Primary | ICD-10-CM

## 2018-01-18 NOTE — MR AVS SNAPSHOT
900 [facility] 305 1007 [facility] 
[phone] Patient: Tay Reilly MRN: [id number] VSE:[id number] Visit Information Date & Time Provider Department Dept. Phone Encounter #  
 1/18/2018  8:40 AM MD Brandon Vaughan [phone] [id number] Upcoming Health Maintenance Date Due  
 OB 3RD TRIMESTER TDAP 1/5/2018 PAP AKA CERVICAL CYTOLOGY 8/28/2020 Allergies as of 1/18/2018  Review Complete On: 1/18/2018 By: Roderick Hancock LPN Severity Noted Reaction Type Reactions [place] High 08/28/2017    Anaphylaxis Current Immunizations  Never Reviewed Name Date Influenza Vaccine (Quad) PF 10/10/2017 Not reviewed this visit Vitals BP Height(growth percentile) Weight(growth percentile) LMP BMI OB Status 120/72 5' 7\" (1.702 m) 288 lb (130.6 kg) 06/30/2017 (Exact Date) 45.11 kg/m2 Pregnant Smoking Status Never Smoker BMI and BSA Data Body Mass Index Body Surface Area  
 45.11 kg/m 2 2.48 m 2 Preferred Pharmacy Pharmacy Name Phone [id number], [address] [phone] Your Updated Medication List  
  
   
This list is accurate as of: 1/18/18  8:51 AM.  Always use your most recent med list.  
  
  
  
  
 PRENATAL 1 + IRON PO Take  by mouth. To-Do List   
 01/25/2018 3:00 PM  
  Appointment with ULTRASOUND 3 SFM at [facility] ([phone]) Patient Instructions Weeks 26 to 30 of Your Pregnancy: Care Instructions Your Care Instructions You are now in your last trimester of pregnancy. Your baby is growing rapidly. And you'll probably feel your baby moving around more often. Your doctor may ask you to count your baby's kicks. Your back may ache as your body gets used to your baby's size and length. If you haven't already had the Tdap shot during this pregnancy, talk to your doctor about getting it. It will help protect your  against pertussis infection. During this time, it's important to take care of yourself and pay attention to what your body needs. If you feel sexual, explore ways to be close with your partner that match your comfort and desire. Use the tips provided in this care sheet to find ways to be sexual in your own way. Follow-up care is a key part of your treatment and safety. Be sure to make and go to all appointments, and call your doctor if you are having problems. It's also a good idea to know your test results and keep a list of the medicines you take. How can you care for yourself at home? Take it easy at work · Take frequent breaks. If possible, stop working when you are tired, and rest during your lunch hour. · Take bathroom breaks every 2 hours. · Change positions often. If you sit for long periods, stand up and walk around. · When you stand for a long time, keep one foot on a low stool with your knee bent. After standing a lot, sit with your feet up. · Avoid fumes, chemicals, and tobacco smoke. Be sexual in your own way · Having sex during pregnancy is okay, unless your doctor tells you not to. · You may be very interested in sex, or you may have no interest at all. · Your growing belly can make it hard to find a good position during intercourse. [place] and explore. · You may get cramps in your uterus when your partner touches your breasts. · A back rub may relieve the backache or cramps that sometimes follow orgasm. Learn about  labor · Watch for signs of  labor. You may be going into labor if: 
¨ You have menstrual-like cramps, with or without nausea. ¨ You have about 4 or more contractions in 20 minutes, or about 8 or more within 1 hour, even after you have had a glass of water and are resting. ¨ You have a low, dull backache that does not go away when you change your position. ¨ You have pain or pressure in your pelvis that comes and goes in a pattern. ¨ You have intestinal cramping or flu-like symptoms, with or without diarrhea. ¨ You notice an increase or change in your vaginal discharge. Discharge may be heavy, mucus-like, watery, or streaked with blood. ¨ Your water breaks. · If you think you have  labor: ¨ Drink 2 or 3 glasses of water or juice. Not drinking enough fluids can cause contractions. ¨ Stop what you are doing, and empty your bladder. Then lie down on your left side for at least 1 hour. ¨ While lying on your side, find your breast bone. Put your fingers in the soft spot just below it. Move your fingers down toward your belly button to find the top of your uterus. Check to see if it is tight. ¨ Contractions can be weak or strong. Record your contractions for an hour. Time a contraction from the start of one contraction to the start of the next one. ¨ Single or several strong contractions without a pattern are called Terence-Andrews contractions. They are practice contractions but not the start of labor. They often stop if you change what you are doing. ¨ Call your doctor if you have regular contractions. Where can you learn more? Go to http://chris-danette.info/. Enter W583 in the search box to learn more about \"Weeks 26 to 30 of Your Pregnancy: Care Instructions. \" Current as of: 2017 Content Version: 11.4 © 8808-2196 Spotjournal. Care instructions adapted under license by [employer] (which disclaims liability or warranty for this information). If you have questions about a medical condition or this instruction, always ask your healthcare professional. [place] [address] any warranty or liability for your use of this information. Introducing [facility] & HE[facility] SERVICES! Dear Gerhard Godwin: Thank you for requesting a Power.com account. Our records indicate that you already have an active Power.com account. You can access your account anytime at [email or web address] Did you know that you can access your hospital and ER discharge instructions at any time in Power.com? You can also review all of your test results from your hospital stay or ER visit. Additional Information If you have questions, please visit the Frequently Asked Questions section of the Power.com website at [email or web address] Remember, Power.com is NOT to be used for urgent needs. For medical emergencies, dial 911. Now available from your iPhone and Android! Please provide this summary of care documentation to your next provider. Your primary care clinician is listed as PROVIDER UNKNOWN. If you have any questions after today's visit, please call [phone].

## 2018-01-18 NOTE — PROGRESS NOTES
[facility]  [email or web address]  [phone]    Ok Rankin MD, FACOG     Follow-up OB visit    Chief Complaint   Patient presents with    Routine Prenatal Visit       Vitals:    18 0849   BP: 120/72   Weight: 288 lb (130.6 kg)   Height: 5' 7\" (1.702 m)       Patient Active Problem List    Diagnosis Date Noted    Prenatal care of primigravida, antepartum 2017       The patient reports the following concerns: None. See PN flowsheet for exam    29 y.o.  28w6d   Encounter Diagnosis   Name Primary?  Prenatal care of primigravida, antepartum Yes        [] SAB/bleeding precautions reviewed   [x] PTL/PPROM precautions reviewed   [] Labor precautions reviewed   [] Fetal kick counts discussed   [] Labs reviewed with patient   [] Cecillia Gull precautions reviewed   [] Consent reviewed   [] Handouts given to pt   [] Glucola handout    [] GBS/labor/Magic Hour handout   []    []    []    []    Follow-up Disposition:  Return in about 2 weeks (around 2018).     Orders Placed This Encounter    GLUCOSE, GESTATIONAL 1 HR TOLERANCE    CBC W/O DIFF       Ok Rankin MD

## 2018-01-18 NOTE — PATIENT INSTRUCTIONS
Weeks 26 to 30 of Your Pregnancy: Care Instructions  Your Care Instructions    You are now in your last trimester of pregnancy. Your baby is growing rapidly. And you'll probably feel your baby moving around more often. Your doctor may ask you to count your baby's kicks. Your back may ache as your body gets used to your baby's size and length. If you haven't already had the Tdap shot during this pregnancy, talk to your doctor about getting it. It will help protect your  against pertussis infection. During this time, it's important to take care of yourself and pay attention to what your body needs. If you feel sexual, explore ways to be close with your partner that match your comfort and desire. Use the tips provided in this care sheet to find ways to be sexual in your own way. Follow-up care is a key part of your treatment and safety. Be sure to make and go to all appointments, and call your doctor if you are having problems. It's also a good idea to know your test results and keep a list of the medicines you take. How can you care for yourself at home? Take it easy at work  · Take frequent breaks. If possible, stop working when you are tired, and rest during your lunch hour. · Take bathroom breaks every 2 hours. · Change positions often. If you sit for long periods, stand up and walk around. · When you stand for a long time, keep one foot on a low stool with your knee bent. After standing a lot, sit with your feet up. · Avoid fumes, chemicals, and tobacco smoke. Be sexual in your own way  · Having sex during pregnancy is okay, unless your doctor tells you not to. · You may be very interested in sex, or you may have no interest at all. · Your growing belly can make it hard to find a good position during intercourse. [place] and explore. · You may get cramps in your uterus when your partner touches your breasts.   · A back rub may relieve the backache or cramps that sometimes follow orgasm. Learn about  labor  · Watch for signs of  labor. You may be going into labor if:  ¨ You have menstrual-like cramps, with or without nausea. ¨ You have about 4 or more contractions in 20 minutes, or about 8 or more within 1 hour, even after you have had a glass of water and are resting. ¨ You have a low, dull backache that does not go away when you change your position. ¨ You have pain or pressure in your pelvis that comes and goes in a pattern. ¨ You have intestinal cramping or flu-like symptoms, with or without diarrhea. ¨ You notice an increase or change in your vaginal discharge. Discharge may be heavy, mucus-like, watery, or streaked with blood. ¨ Your water breaks. · If you think you have  labor:  ¨ Drink 2 or 3 glasses of water or juice. Not drinking enough fluids can cause contractions. ¨ Stop what you are doing, and empty your bladder. Then lie down on your left side for at least 1 hour. ¨ While lying on your side, find your breast bone. Put your fingers in the soft spot just below it. Move your fingers down toward your belly button to find the top of your uterus. Check to see if it is tight. ¨ Contractions can be weak or strong. Record your contractions for an hour. Time a contraction from the start of one contraction to the start of the next one. ¨ Single or several strong contractions without a pattern are called Terence-Andrews contractions. They are practice contractions but not the start of labor. They often stop if you change what you are doing. ¨ Call your doctor if you have regular contractions. Where can you learn more? Go to http://chris-danette.info/. Enter Y458 in the search box to learn more about \"Weeks 26 to 30 of Your Pregnancy: Care Instructions. \"  Current as of: 2017  Content Version: 11.4  © 6220-9425 [employer].  Care instructions adapted under license by [employer] (which disclaims liability or warranty for this information). If you have questions about a medical condition or this instruction, always ask your healthcare professional. [place] [address] any warranty or liability for your use of this information.

## 2018-01-19 LAB
ERYTHROCYTE [DISTWIDTH] IN BLOOD BY AUTOMATED COUNT: 13 % (ref 12.3–15.4)
GLUCOSE 1H P 50 G GLC PO SERPL-MCNC: 120 MG/DL (ref 65–139)
HCT VFR BLD AUTO: 38.9 % (ref 34–46.6)
HCT, EXTERNAL: 38.9
HGB BLD-MCNC: 13.3 G/DL (ref 11.1–15.9)
HGB, EXTERNAL: 13.3
MCH RBC QN AUTO: 35.1 PG (ref 26.6–33)
MCHC RBC AUTO-ENTMCNC: 34.2 G/DL (ref 31.5–35.7)
MCV RBC AUTO: 103 FL (ref 79–97)
PLATELET # BLD AUTO: 304 X10E3/UL (ref 150–379)
PLATELET CNT,   EXTERNAL: 304
RBC # BLD AUTO: 3.79 X10E6/UL (ref 3.77–5.28)
WBC # BLD AUTO: 12.7 X10E3/UL (ref 3.4–10.8)

## 2018-01-25 ENCOUNTER — HOSPITAL ENCOUNTER (OUTPATIENT)
Dept: PERINATAL CARE | Age: 33
Discharge: HOME | End: 2018-01-25
Attending: OBSTETRICS & GYNECOLOGY
Payer: COMMERCIAL

## 2018-01-25 PROCEDURE — 76816 OB US FOLLOW-UP PER FETUS: CPT | Performed by: OBSTETRICS & GYNECOLOGY

## 2018-01-29 ENCOUNTER — APPOINTMENT (OUTPATIENT)
Dept: PHYSICAL THERAPY | Age: 33
End: 2018-01-29
Payer: COMMERCIAL

## 2018-01-30 NOTE — PROGRESS NOTES
Normal results, add to prenatal records. We can review in detail with patient at next visit.   (Likely normal variants  that we can monitor/follow)

## 2018-02-01 ENCOUNTER — ROUTINE PRENATAL (OUTPATIENT)
Dept: OBGYN CLINIC | Age: 33
End: 2018-02-01

## 2018-02-01 VITALS
WEIGHT: 288 LBS | SYSTOLIC BLOOD PRESSURE: 124 MMHG | DIASTOLIC BLOOD PRESSURE: 76 MMHG | TEMPERATURE: 98.8 F | BODY MASS INDEX: 45.11 KG/M2

## 2018-02-01 DIAGNOSIS — Z34.00 PRENATAL CARE OF PRIMIGRAVIDA, ANTEPARTUM: Primary | ICD-10-CM

## 2018-02-01 DIAGNOSIS — Z23 ENCOUNTER FOR IMMUNIZATION: ICD-10-CM

## 2018-02-01 NOTE — PATIENT INSTRUCTIONS
Learning About When to Call Your Doctor During Pregnancy (After 20 Weeks)  Your Care Instructions  It's common to have concerns about what might be a problem during pregnancy. Although most pregnant women don't have any serious problems, it's important to know when to call your doctor if you have certain symptoms or signs of labor. These are general suggestions. Your doctor may give you some more information about when to call. When to call your doctor (after 20 weeks)  Call 911 anytime you think you may need emergency care. For example, call if:  · You have severe vaginal bleeding. · You have sudden, severe pain in your belly. · You passed out (lost consciousness). · You have a seizure. · You see or feel the umbilical cord. · You think you are about to deliver your baby and can't make it safely to the hospital.  Call your doctor now or seek immediate medical care if:  · You have vaginal bleeding. · You have belly pain. · You have a fever. · You have symptoms of preeclampsia, such as:  ¨ Sudden swelling of your face, hands, or feet. ¨ New vision problems (such as dimness or blurring). ¨ A severe headache. · You have a sudden release of fluid from your vagina. (You think your water broke.)  · You think that you may be in labor. This means that you've had at least 4 contractions within 20 minutes or at least 8 contractions in an hour. · You notice that your baby has stopped moving or is moving much less than normal.  · You have symptoms of a urinary tract infection. These may include:  ¨ Pain or burning when you urinate. ¨ A frequent need to urinate without being able to pass much urine. ¨ Pain in the flank, which is just below the rib cage and above the waist on either side of the back. ¨ Blood in your urine. Watch closely for changes in your health, and be sure to contact your doctor if:  · You have vaginal discharge that smells bad.   · You have skin changes, such as:  ¨ A rash.  ¨ Itching. ¨ Yellow color to your skin. · You have other concerns about your pregnancy. If you have labor signs at 37 weeks or more  If you have signs of labor at 37 weeks or more, your doctor may tell you to call when your labor becomes more active. Symptoms of active labor include:  · Contractions that are regular. · Contractions that are less than 5 minutes apart. · Contractions that are hard to talk through. Follow-up care is a key part of your treatment and safety. Be sure to make and go to all appointments, and call your doctor if you are having problems. It's also a good idea to know your test results and keep a list of the medicines you take. Where can you learn more? Go to http://chris-danette.info/. Enter  in the search box to learn more about \"Learning About When to Call Your Doctor During Pregnancy (After 20 Weeks). \"  Current as of: March 16, 2017  Content Version: 11.4  © 0410-6706 Healthwise, Incorporated. Care instructions adapted under license by [employer] (which disclaims liability or warranty for this information). If you have questions about a medical condition or this instruction, always ask your healthcare professional. [place] [address] any warranty or liability for your use of this information.

## 2018-02-01 NOTE — PROGRESS NOTES
Patient received TDAP injection per MD order and with patient consent. Patient tolerated injection well, no complications noted.

## 2018-02-01 NOTE — PROGRESS NOTES
[facility]  [email or web address]  [phone]    Ok Rankin MD, FACOG     Follow-up OB visit    Chief Complaint   Patient presents with    Routine Prenatal Visit       Vitals:    02/01/18 0818   BP: 124/76   Temp: 98.8 °F (37.1 °C)   TempSrc: Oral   Weight: 288 lb (130.6 kg)       Patient Active Problem List    Diagnosis Date Noted    Prenatal care of primigravida, antepartum 08/28/2017       The patient reports the following concerns: none    See PN flowsheet for exam  Breast exam: breasts appear normal, no suspicious masses, no skin or nipple changes or axillary nodes. 29 y.o. 12 [facility]   Encounter Diagnoses   Name Primary?     Encounter for immunization     Prenatal care of primigravida, antepartum Yes     Disc typical breast changes in pregnancy   [] SAB/bleeding precautions reviewed   [] PTL/PPROM precautions reviewed   [] Labor precautions reviewed   [] Fetal kick counts discussed   [] Labs reviewed with patient   [] Cecillia Gull precautions reviewed   [] Consent reviewed   [] Handouts given to pt   [] Glucola handout    [] GBS/labor/Magic Hour handout   []    []    []    []    Follow-up Disposition: Not on File    Orders Placed This Encounter    TETANUS, [address] (TDAP), IN INDIVIDS. >=7, IM   Shonna Velásquez ADMIN,1 SINGLE/COMB VAC/TOXOID       Ok Rankin MD

## 2018-02-01 NOTE — MR AVS SNAPSHOT
900 [facility] [address] [facility] 
[phone] Patient: John Chester MRN: [id number] Visit Information Date & Time Provider Department Dept. Phone Encounter #  
 2/1/2018  8:00 AM MD Brandon Montana [phone] [id number] 2/15/2018  7:50 AM  
OB VISIT with MD Brandon Montana ([facility]) Appt Note: 2wk fob TP  
 [address] [facility] [address]  
[phone]  
  
   
 [address] [address]  
  
    
 3/2/2018  9:10 AM  
OB VISIT with MD Brandon Montana ([facility]) Appt Note: 2wk fob TP  
 [address] [facility] [address] [place] [address]  
[phone] 3/16/2018  9:00 AM  
OB VISIT with MD Brandon Montana ([facility]) Appt Note: 36wk + GBS TP  
 [address] [facility] [address] [place] [address]  
[phone] Upcoming Health Maintenance Date Due  
 OB 3RD TRIMESTER TDAP 1/5/2018 PAP AKA CERVICAL CYTOLOGY 8/28/2020 Allergies as of 2/1/2018  Review Complete On: 2/1/2018 By: Isatu Alva LPN Severity Noted Reaction Type Reactions [place] High 08/28/2017    Anaphylaxis Current Immunizations  Never Reviewed Name Date Influenza Vaccine (Quad) PF 10/10/2017 Tdap  Incomplete Not reviewed this visit You Were Diagnosed With   
  
 Codes Comments Encounter for immunization    -  Primary ICD-10-CM: V22 ICD-9-CM: V03.89 Vitals BP Weight(growth percentile) LMP BMI OB Status Smoking Status 124/76 (BP 1 Location: Left arm, BP Patient Position: Sitting) 288 lb (130.6 kg) 06/30/2017 (Exact Date) 45.11 kg/m2 Pregnant Never Smoker BMI and BSA Data Body Mass Index Body Surface Area  
 45.11 kg/m 2 2.48 m 2 Preferred Pharmacy Pharmacy Name Phone [id number] Martin, [address] [facility] [phone] Your Updated Medication List  
  
   
This list is accurate as of: 2/1/18  8:20 AM.  Always use your most recent med list.  
  
  
  
  
 PRENATAL 1 + IRON PO Take  by mouth. We Performed the Following TX IMMUNIZ ADMIN,1 SINGLE/COMB VAC/TOXOID [id number] CPT(R)] TETANUS, DIPHTHERIA TOXOIDS AND ACELLULAR PERTUSSIS VACCINE (TDAP), IN INDIVIDS. >=7, IM [id number] CPT(R)] Patient Instructions Learning About When to Call Your Doctor During Pregnancy (After 20 Weeks) Your Care Instructions It's common to have concerns about what might be a problem during pregnancy. Although most pregnant women don't have any serious problems, it's important to know when to call your doctor if you have certain symptoms or signs of labor. These are general suggestions. Your doctor may give you some more information about when to call. When to call your doctor (after 20 weeks) Call 911 anytime you think you may need emergency care. For example, call if: 
· You have severe vaginal bleeding. · You have sudden, severe pain in your belly. · You passed out (lost consciousness). · You have a seizure. · You see or feel the umbilical cord. · You think you are about to deliver your baby and can't make it safely to the hospital. 
Call your doctor now or seek immediate medical care if: 
· You have vaginal bleeding. · You have belly pain. · You have a fever. · You have symptoms of preeclampsia, such as: 
¨ Sudden swelling of your face, hands, or feet. ¨ New vision problems (such as dimness or blurring). ¨ A severe headache. · You have a sudden release of fluid from your vagina. (You think your water broke.) · You think that you may be in labor. This means that you've had at least 4 contractions within 20 minutes or at least 8 contractions in an hour. · You notice that your baby has stopped moving or is moving much less than normal. 
· You have symptoms of a urinary tract infection. These may include: 
¨ Pain or burning when you urinate. ¨ A frequent need to urinate without being able to pass much urine. ¨ Pain in the flank, which is just below the rib cage and above the waist on either side of the back. ¨ Blood in your urine. Watch closely for changes in your health, and be sure to contact your doctor if: 
· You have vaginal discharge that smells bad. · You have skin changes, such as: ¨ A rash. ¨ Itching. ¨ Yellow color to your skin. · You have other concerns about your pregnancy. If you have labor signs at 37 weeks or more If you have signs of labor at 37 weeks or more, your doctor may tell you to call when your labor becomes more active. Symptoms of active labor include: 
· Contractions that are regular. · Contractions that are less than 5 minutes apart. · Contractions that are hard to talk through. Follow-up care is a key part of your treatment and safety. Be sure to make and go to all appointments, and call your doctor if you are having problems. It's also a good idea to know your test results and keep a list of the medicines you take. Where can you learn more? Go to http://chris-danette.info/. Enter  in the search box to learn more about \"Learning About When to Call Your Doctor During Pregnancy (After 20 Weeks). \" 
Current as of: March 16, 2017 Content Version: 11.4 © 6734-6322 Healthwise, Incorporated. Care instructions adapted under license by [employer] (which disclaims liability or warranty for this information). If you have questions about a medical condition or this instruction, always ask your healthcare professional. [address] any warranty or liability for your use of this information. Introducing [facility] & HEALTH SERVICES! Dear Magda Wallace: Thank you for requesting a BOOK A TIGER account. Our records indicate that you already have an active BOOK A TIGER account. You can access your account anytime at [email or web address]. [employer]/ANTs Software Did you know that you can access your hospital and ER discharge instructions at any time in BOOK A TIGER? You can also review all of your test results from your hospital stay or ER visit. Additional Information If you have questions, please visit the Frequently Asked Questions section of the BOOK A TIGER website at [email or web address] [email or web address] Remember, BOOK A TIGER is NOT to be used for urgent needs. For medical emergencies, dial 911. Now available from your iPhone and Android! Please provide this summary of care documentation to your next provider. Your primary care clinician is listed as PROVIDER UNKNOWN. If you have any questions after today's visit, please call [phone].

## 2018-02-15 ENCOUNTER — ROUTINE PRENATAL (OUTPATIENT)
Dept: OBGYN CLINIC | Age: 33
End: 2018-02-15

## 2018-02-15 VITALS
WEIGHT: 280 LBS | DIASTOLIC BLOOD PRESSURE: 70 MMHG | BODY MASS INDEX: 43.95 KG/M2 | SYSTOLIC BLOOD PRESSURE: 116 MMHG | HEIGHT: 67 IN

## 2018-02-15 DIAGNOSIS — Z34.00 PRENATAL CARE OF PRIMIGRAVIDA, ANTEPARTUM: Primary | ICD-10-CM

## 2018-02-15 DIAGNOSIS — O21.9 NAUSEA/VOMITING IN PREGNANCY: ICD-10-CM

## 2018-02-15 RX ORDER — RANITIDINE 150 MG/1
150 TABLET, FILM COATED ORAL 2 TIMES DAILY
COMMUNITY
End: 2018-08-17

## 2018-02-15 NOTE — PROGRESS NOTES
[facility]  [email or web address]  [phone]    Robert Gutierrez MD, FACOG     Follow-up OB visit    Chief Complaint   Patient presents with    Routine Prenatal Visit       Vitals:    02/15/18 0805   BP: 116/70   Weight: 200 lb (90.7 kg)   Height: 5' 7\" (1.702 m)       Patient Active Problem List    Diagnosis Date Noted    Prenatal care of primigravida, antepartum 08/28/2017       The patient reports the following concerns: Patient reports she is still having morning sickness. +2 ketones in urine. See PN flowsheet for exam    29 y.o. Nam Villarreal   Encounter Diagnoses   Name Primary?  Prenatal care of primigravida, antepartum Yes    Nausea/vomiting in pregnancy      Disc rba of cord blood collection   [] SAB/bleeding precautions reviewed   [] PTL/PPROM precautions reviewed   [] Labor precautions reviewed   [] Fetal kick counts discussed   [] Labs reviewed with patient   [] Main Folds precautions reviewed   [] Consent reviewed   [] Handouts given to pt   [] Glucola handout    [] GBS/labor/Magic Hour handout   []    []    []    []    Follow-up Disposition:  Return in about 2 weeks (around 3/1/2018) for Follow up OB visit. No orders of the defined types were placed in this encounter.       Robert Gutierrez MD

## 2018-02-15 NOTE — PATIENT INSTRUCTIONS

## 2018-02-15 NOTE — MR AVS SNAPSHOT
[address] [place] [address] [facility] 
[phone] Patient: Virgil Natarajan MRN: [id number] Visit Information Date & Time Provider Department Dept. Phone Encounter #  
 2/15/2018  7:50 AM Bentley Stallworth MD [address] [id number] Follow-up Instructions Return in about 2 weeks (around 3/1/2018) for Follow up OB visit. 3/2/2018  9:10 AM  
OB VISIT with MD Brandon Denis ([address]) Appt Note: 2wk fob TP  
 [address] [place] [address]  
[phone]  
  
   
 [address] [place] [address] Road [address] [place] [address]  
  
    
 3/16/2018  9:00 AM  
OB VISIT with MD Brandon Denis ([facility]) Appt Note: 36wk + GBS TP  
 [address] [facility] [address] [place] [address]  
[phone] Upcoming Health Maintenance Date Due  
 PAP AKA CERVICAL CYTOLOGY 8/28/2020 Allergies as of 2/15/2018  Review Complete On: 2/15/2018 By: Bentley Stallworth MD  
  
 Severity Noted Reaction Type Reactions Quinter High 08/28/2017    Anaphylaxis Current Immunizations  Never Reviewed Name Date Influenza Vaccine (Quad) PF 10/10/2017 Tdap 2/1/2018 Not reviewed this visit You Were Diagnosed With   
  
 Codes Comments Prenatal care of primigravida, antepartum    -  Primary ICD-10-CM: Z34.00 ICD-9-CM: V22.0 Nausea/vomiting in pregnancy     ICD-10-CM: O21.9 ICD-9-CM: 643.90 Vitals BP Height(growth percentile) Weight(growth percentile) LMP BMI OB Status 116/70 5' 7\" (1.702 m) 200 lb (90.7 kg) 06/30/2017 (Exact Date) 31.32 kg/m2 Pregnant Smoking Status Never Smoker BMI and BSA Data Body Mass Index Body Surface Area  
 31.32 kg/m 2 2.07 m 2 Preferred Pharmacy Pharmacy Name Phone [facility] #[id number]- [place], [address] [phone] Your Updated Medication List  
  
   
This list is accurate as of: 2/15/18  8:10 AM.  Always use your most recent med list.  
  
  
  
  
 PRENATAL 1 + IRON PO Take  by mouth. ZANTAC 150 mg tablet Generic drug:  raNITIdine Take 150 mg by mouth two (2) times a day. Follow-up Instructions Return in about 2 weeks (around 3/1/2018) for Follow up OB visit. To-Do List   
 2018 2:45 PM  
  Appointment with ULTRASOUND 3 SFM at [facility] ([phone]) Patient Instructions Weeks 32 to 34 of Your Pregnancy: Care Instructions Your Care Instructions During the last few weeks of your pregnancy, you may have more aches and pains. It's important to rest when you can. Your growing baby is putting more pressure on your bladder. So you may need to urinate more often. Hemorrhoids are also common. These are painful, itchy veins in the rectal area. In the 36th week, most women have a test for group B streptococcus (GBS). GBS is a common bacteria that can live in the vagina and rectum. It can make your baby sick after birth. If you test positive, you will get antibiotics during labor. These will keep your baby from getting the bacteria. You may want to talk with your doctor about banking your baby's umbilical cord blood. This is the blood left in the cord after birth. If you want to save this blood, you must arrange it ahead of time. You can't decide at the last minute. If you haven't already had the Tdap shot during this pregnancy, talk to your doctor about getting it. It will help protect your  against pertussis infection. Follow-up care is a key part of your treatment and safety. Be sure to make and go to all appointments, and call your doctor if you are having problems.  It's also a good idea to know your test results and keep a list of the medicines you take. How can you care for yourself at home? Ease hemorrhoids · Get more liquids, fruits, vegetables, and fiber in your diet. This will help keep your stools soft. · Avoid sitting for too long. Lie on your left side several times a day. · Clean yourself with soft, moist toilet paper. Or you can use witch hazel pads or personal hygiene pads. · If you are uncomfortable, try ice packs. Or you can sit in a warm sitz bath. Do these for 20 minutes at a time, as needed. · Use hydrocortisone cream for pain and itching. Two examples are Anusol and Preparation H Hydrocortisone. · Ask your doctor about taking an over-the-counter stool softener. Consider breastfeeding · Experts recommend that women breastfeed for 1 year or longer. Breast milk is the perfect food for babies. · Breast milk is easier for babies to digest than formula. And it is always available, just the right temperature, and free. · In general, babies who are  are healthier than formula-fed babies. ¨  babies are less likely to get ear infections, colds, diarrhea, and pneumonia. ¨  babies who are fed only breast milk are less likely to get asthma and allergies. ¨  babies are less likely to be obese. ¨  babies are less likely to get diabetes or heart disease. · Women who breastfeed have less bleeding after the birth. Their uteruses also shrink back faster. · Some women who breastfeed lose weight faster. Making milk burns calories. · Breastfeeding can lower your risk of breast cancer, ovarian cancer, and osteoporosis. Decide about circumcision for boys · As you make this decision, it may help to think about your personal, Scientologist, and family traditions. You get to decide if you will keep your son's penis natural or if he will be circumcised.  
· If you decide that you would like to have your baby circumcised, talk with your doctor. You can share your concerns about pain. And you can discuss your preferences for anesthesia. Where can you learn more? Go to http://chris-danette.info/. Enter Y929 in the search box to learn more about \"Weeks 32 to 34 of Your Pregnancy: Care Instructions. \" Current as of: March 16, 2017 Content Version: 11.4 © 7805-2276 [employer]. Care instructions adapted under license by [employer] (which disclaims liability or warranty for this information). If you have questions about a medical condition or this instruction, always ask your healthcare professional. [place] [address] any warranty or liability for your use of this information. Introducing [facility] & HEALTH SERVICES! Dear Otf Chance: Thank you for requesting a Allovue account. Our records indicate that you already have an active Allovue account. You can access your account anytime at [email or web address] Did you know that you can access your hospital and ER discharge instructions at any time in Allovue? You can also review all of your test results from your hospital stay or ER visit. Additional Information If you have questions, please visit the Frequently Asked Questions section of the Allovue website at [email or web address] Remember, Allovue is NOT to be used for urgent needs. For medical emergencies, dial 911. Now available from your iPhone and Android! Please provide this summary of care documentation to your next provider. Your primary care clinician is listed as PROVIDER UNKNOWN. If you have any questions after today's visit, please call [phone].

## 2018-02-22 ENCOUNTER — HOSPITAL ENCOUNTER (OUTPATIENT)
Dept: PERINATAL CARE | Age: 33
Discharge: HOME | End: 2018-02-22
Attending: OBSTETRICS & GYNECOLOGY

## 2018-03-02 ENCOUNTER — ROUTINE PRENATAL (OUTPATIENT)
Dept: OBGYN CLINIC | Age: 33
End: 2018-03-02

## 2018-03-02 VITALS
SYSTOLIC BLOOD PRESSURE: 120 MMHG | WEIGHT: 293 LBS | BODY MASS INDEX: 45.99 KG/M2 | DIASTOLIC BLOOD PRESSURE: 80 MMHG | HEIGHT: 67 IN

## 2018-03-02 DIAGNOSIS — Z34.00 PRENATAL CARE OF PRIMIGRAVIDA, ANTEPARTUM: Primary | ICD-10-CM

## 2018-03-02 DIAGNOSIS — O99.210 OBESITY IN PREGNANCY: ICD-10-CM

## 2018-03-02 LAB — GRBS, EXTERNAL: NEGATIVE

## 2018-03-02 NOTE — PROGRESS NOTES
[facility]  [email or web address]  [phone]    Ok Tyson MD, FACOG     Follow-up OB visit    Chief Complaint   Patient presents with    Routine Prenatal Visit       Vitals:    18 0915   BP: 120/80   Weight: 293 lb (132.9 kg)   Height: 5' 7\" (1.702 m)       Patient Active Problem List    Diagnosis Date Noted    Prenatal care of primigravida, antepartum 2017       The patient reports the following concerns: Patient reports ankle swelling, that does improve with elevation. Patient would also like to discuss the position of the baby, and possibility of a . See PN flowsheet for exam    29 y.o. Caron Alatorre 35w0d   Encounter Diagnoses   Name Primary?     Prenatal care of primigravida, antepartum Yes    Breech presentation on examination, single or unspecified fetus     Obesity in pregnancy      Disc rba for breech  Cs at 39 wks if doesn't rotate  Disc position changes  ECV disc: pt declined  FU US check position 2 wks  PIH prec   [] SAB/bleeding precautions reviewed   [] PTL/PPROM precautions reviewed   [] Labor precautions reviewed   [] Fetal kick counts discussed   [] Labs reviewed with patient   [] Massiel Eagles precautions reviewed   [] Consent reviewed   [] Handouts given to pt   [] Glucola handout    [] GBS/labor/Magic Hour handout   []    []    []    []    Follow-up Disposition: Not on File    Orders Placed This Encounter   Bartolo Carlisle MD

## 2018-03-02 NOTE — PATIENT INSTRUCTIONS

## 2018-03-02 NOTE — MR AVS SNAPSHOT
[address] [facility] [address] [facility] 
[phone] Patient: Jessica Robledo MRN: [id number] Visit Information Date & Time Provider Department Dept. Phone Encounter #  
 3/2/2018  9:10 AM MD Brandon Hare [phone] [id number] 3/16/2018  9:00 AM  
OB VISIT with MD Brandon [facility]are [facility] Appt Note: 36wk + GBS TP  
 [address] [facility] [address] [phone]  
[address] [facility] Upcoming Health Maintenance Date Due  
 PAP AKA CERVICAL CYTOLOGY 8/28/2020 Allergies as of 3/2/2018  Review Complete On: 3/2/2018 By: Pamella Crisostomo LPN Severity Noted Reaction Type Reactions Royce High 08/28/2017    Anaphylaxis Current Immunizations  Never Reviewed Name Date Influenza Vaccine (Quad) PF 10/10/2017 Tdap 2/1/2018 Not reviewed this visit Vitals BP Height(growth percentile) Weight(growth percentile) LMP BMI OB Status 120/80 5' 7\" (1.702 m) 293 lb (132.9 kg) 06/30/2017 (Exact Date) 45.89 kg/m2 Pregnant Smoking Status Never Smoker BMI and BSA Data Body Mass Index Body Surface Area 45.89 kg/m 2 2.51 m 2 Preferred Pharmacy Pharmacy Name Phone [id number], [address] [place], [place] [phone] Your Updated Medication List  
  
   
This list is accurate as of 3/2/18  9:17 AM.  Always use your most recent med list. AMBULATORY BREAST PUMP Use as directed PRENATAL 1 + IRON PO Take  by mouth. ZANTAC 150 mg tablet Generic drug:  raNITIdine Take 150 mg by mouth two (2) times a day. Patient Instructions Weeks 34 to 36 of Your Pregnancy: Care Instructions Your Care Instructions By now, your baby and your belly have grown quite large. It is almost time to give birth. A full-term pregnancy can deliver between 37 and 42 weeks. Your baby's lungs are almost ready to breathe air. The bones in your baby's head are now firm enough to protect it, but soft enough to move down through the birth canal. 
You may feel excited, happy, anxious, or scared. You may wonder how you will know if you are in labor or what to expect during labor. Try to be flexible in your expectations of the birth. Because each birth is different, there is no way to know exactly what childbirth will be like for you. This care sheet will help you know what to expect and how to prepare. This may make your childbirth easier. If you haven't already had the Tdap shot during this pregnancy, talk to your doctor about getting it. It will help protect your  against pertussis infection. In the 36th week, most women have a test for group B streptococcus (GBS). GBS is a common bacteria that can live in the vagina and rectum. It can make your baby sick after birth. If you test positive, you will get antibiotics during labor. The medicine will keep your baby from getting the bacteria. Follow-up care is a key part of your treatment and safety. Be sure to make and go to all appointments, and call your doctor if you are having problems. It's also a good idea to know your test results and keep a list of the medicines you take. How can you care for yourself at home? Learn about pain relief choices · Pain is different for every woman. Talk with your doctor about your feelings about pain. · You can choose from several types of pain relief. These include medicine or breathing techniques, as well as comfort measures. You can use more than one option. · If you choose to have pain medicine during labor, talk to your doctor about your options.  Some medicines lower anxiety and help with some of the pain. Others make your lower body numb so that you won't feel pain. · Be sure to tell your doctor about your pain medicine choice before you start labor or very early in your labor. You may be able to change your mind as labor progresses. · Rarely, a woman is put to sleep by medicine given through a mask or an IV. Labor and delivery · The first stage of labor has three parts: early, active, and transition. ¨ Most women have early labor at home. You can stay busy or rest, eat light snacks, drink clear fluids, and start counting contractions. ¨ When talking during a contraction gets hard, you may be moving to active labor. During active labor, you should head for the hospital if you are not there already. ¨ You are in active labor when contractions come every 3 to 4 minutes and last about 60 seconds. Your cervix is opening more rapidly. ¨ If your water breaks, contractions will come faster and stronger. ¨ During transition, your cervix is stretching, and contractions are coming more rapidly. ¨ You may want to push, but your cervix might not be ready. Your doctor will tell you when to push. · The second stage starts when your cervix is completely opened and you are ready to push. ¨ Contractions are very strong to push the baby down the birth canal. 
¨ You will feel the urge to push. You may feel like you need to have a bowel movement. ¨ You may be coached to push with contractions. These contractions will be very strong, but you will not have them as often. You can get a little rest between contractions. ¨ You may be emotional and irritable. You may not be aware of what is going on around you. ¨ One last push, and your baby is born. · The third stage is when a few more contractions push out the placenta. This may take 30 minutes or less. · The fourth stage is the welcome recovery. You may feel overwhelmed with emotions and exhausted but alert. This is a good time to start breastfeeding. Where can you learn more? Go to http://chris-danette.info/. Enter L591 in the search box to learn more about \"Weeks 34 to 36 of Your Pregnancy: Care Instructions. \" Current as of: March 16, 2017 Content Version: 11.4 © 8073-2346 [employer]. Care instructions adapted under license by [employer] (which disclaims liability or warranty for this information). If you have questions about a medical condition or this instruction, always ask your healthcare professional. [address] any warranty or liability for your use of this information. Introducing \[facility]\"" & HEALTH SERVICES! Dear Pravin Villalta: Thank you for requesting a SI-BONE account. Our records indicate that you already have an active SI-BONE account. You can access your account anytime at [email or web address] Did you know that you can access your hospital and ER discharge instructions at any time in SI-BONE? You can also review all of your test results from your hospital stay or ER visit. Additional Information If you have questions, please visit the Frequently Asked Questions section of the SI-BONE website at [email or web address] Remember, SI-BONE is NOT to be used for urgent needs. For medical emergencies, dial 911. Now available from your iPhone and Android! Please provide this summary of care documentation to your next provider. Your primary care clinician is listed as PROVIDER UNKNOWN. If you have any questions after today's visit, please call [phone].

## 2018-03-04 LAB — GP B STREP DNA SPEC QL NAA+PROBE: NEGATIVE

## 2018-03-16 ENCOUNTER — ROUTINE PRENATAL (OUTPATIENT)
Dept: OBGYN CLINIC | Age: 33
End: 2018-03-16

## 2018-03-16 VITALS
HEIGHT: 67 IN | SYSTOLIC BLOOD PRESSURE: 118 MMHG | DIASTOLIC BLOOD PRESSURE: 72 MMHG | BODY MASS INDEX: 45.99 KG/M2 | WEIGHT: 293 LBS

## 2018-03-16 DIAGNOSIS — Z34.00 PRENATAL CARE OF PRIMIGRAVIDA, ANTEPARTUM: Primary | ICD-10-CM

## 2018-03-16 RX ORDER — DIPHENHYDRAMINE HCL 25 MG
25 CAPSULE ORAL
COMMUNITY
End: 2018-03-25

## 2018-03-16 NOTE — PATIENT INSTRUCTIONS
Week 37 of Your Pregnancy: Care Instructions  Your Care Instructions    You are near the end of your pregnancy-and you're probably pretty uncomfortable. It may be harder to walk around. Lying down probably isn't comfortable either. You may have trouble getting to sleep or staying asleep. Most women deliver their babies between 40 and 41 weeks. This is a good time to think about packing a bag for the hospital with items you'll need. Then you'll be ready when labor starts. Follow-up care is a key part of your treatment and safety. Be sure to make and go to all appointments, and call your doctor if you are having problems. It's also a good idea to know your test results and keep a list of the medicines you take. How can you care for yourself at home? Learn about breastfeeding  · Breastfeeding is best for your baby and good for you. · Breast milk has antibodies to help your baby fight infections. · Mothers who breastfeed often lose weight faster, because making milk burns calories. · Learning the best ways to hold your baby will make breastfeeding easier. · Let your partner bathe and diaper the baby to keep your partner from feeling left out. Snuggle together when you breastfeed. · You may want to learn how to use a breast pump and store your milk. · If you choose to bottle feed, make the feeding feel like breastfeeding so you can bond with your baby. Always hold your baby and the bottle. Do not prop bottles or let your baby fall asleep with a bottle. Learn about crying  · It is common for babies to cry for 1 to 3 hours a day. Some cry more, some cry less. · Babies don't cry to make you upset or because you are a bad parent. · Crying is how your baby communicates. Your baby may be hungry; have gas; need a diaper change; or feel cold, warm, tired, lonely, or tense. Sometimes babies cry for unknown reasons. · If you respond to your baby's needs, he or she will learn to trust you.   · Try to stay calm when your baby cries. Your baby may get more upset if he or she senses that you are upset. Know how to care for your   · Your baby's umbilical cord stump will drop off on its own, usually between 1 and 2 weeks. To care for your baby's umbilical cord area:  ¨ Clean the area at the bottom of the cord 2 or 3 times a day. ¨ Pay special attention to the area where the cord attaches to the skin. ¨ Keep the diaper folded below the cord. ¨ Use a damp washcloth or cotton ball to sponge bathe your baby until the stump has come off. · Your baby's first dark stool is called meconium. After the meconium is passed, your baby will develop his or her own bowel pattern. ¨ Some babies, especially  babies, have several bowel movements a day. Others have one or two a day, or one every 2 to 3 days. ¨  babies often have loose, yellow stools. Formula-fed babies have more formed stools. ¨ If your baby's stools look like little pellets, he or she is constipated. After 2 days of constipation, call your baby's doctor. · If your baby will be circumcised, you can care for him at home. ¨ Gently rinse his penis with warm water after every diaper change. Do not try to remove the film that forms on the penis. This film will go away on its own. Pat dry. ¨ Put petroleum ointment, such as Vaseline, on the area of the diaper that will touch your baby's penis. This will keep the diaper from sticking to your baby. ¨ Ask the doctor about giving your baby acetaminophen (Tylenol) for pain. Where can you learn more? Go to http://chris-danette.info/. Enter 68 21 97 in the search box to learn more about \"Week 37 of Your Pregnancy: Care Instructions. \"  Current as of: 2017  Content Version: 11.4  © 1513-8580 [employer] On[employer]. Care instructions adapted under license by [employer] (which disclaims liability or warranty for this information).  If you have questions about a medical condition or this instruction, always ask your healthcare professional. [place] [address] any warranty or liability for your use of this information.

## 2018-03-16 NOTE — MR AVS SNAPSHOT
[address] [place] [address] [facility] [address] 100[address] [facility] 
[phone] Patient: Jenny Beltrán MRN: [id number] Visit Information Date & Time Provider Department Dept. Phone Encounter #  
 3/16/2018  9:00 AM MD Brandon Lacey [phone] [id number] Your Appointments 3/30/2018  9:30 AM  
[facility]ED[facility] MD [facility] Appt Note: Repeat C/S  
 [address] [facility] [address] [facility] [phone]  
[facility] 31 1233 [address] Upcoming Health Maintenance Date Due  
 PAP AKA CERVICAL CYTOLOGY 2020 Allergies as of 3/16/2018  Review Complete On: 3/16/2018 By: Ranjti Carter LPN Severity Noted Reaction Type Reactions Oryce High 2017    Anaphylaxis Current Immunizations  Never Reviewed Name Date Influenza Vaccine (Quad) PF 10/10/2017 Tdap 2018 Not reviewed this visit Vitals BP Height(growth percentile) Weight(growth percentile) LMP BMI OB Status 118/72 5' 7\" (1.702 m) 295 lb (133.8 kg) 2017 (Exact Date) 46.2 kg/m2 Pregnant Smoking Status Never Smoker BMI and BSA Data Body Mass Index Body Surface Area  
 46.2 kg/m 2 2.51 m 2 Preferred Pharmacy Pharmacy Name Phone [id number] [place], [address] [facility] [phone] Your Updated Medication List  
  
   
This list is accurate as of 3/16/18  9:38 AM.  Always use your most recent med list. AMBULATORY BREAST PUMP Use as directed BENADRYL 25 mg capsule Generic drug:  diphenhydrAMINE Take 25 mg by mouth every six (6) hours as needed. PRENATAL 1 + IRON PO Take  by mouth. ZANTAC 150 mg tablet Generic drug:  raNITIdine Take 150 mg by mouth two (2) times a day. To-Do List   
 03/23/2018 8:00 AM  
  Appointment with ULTRASOUND 3 SFM at [facility] ([phone]) Patient Instructions Week 37 of Your Pregnancy: Care Instructions Your Care Instructions You are near the end of your pregnancy-and you're probably pretty uncomfortable. It may be harder to walk around. Lying down probably isn't comfortable either. You may have trouble getting to sleep or staying asleep. Most women deliver their babies between 40 and 41 weeks. This is a good time to think about packing a bag for the hospital with items you'll need. Then you'll be ready when labor starts. Follow-up care is a key part of your treatment and safety. Be sure to make and go to all appointments, and call your doctor if you are having problems. It's also a good idea to know your test results and keep a list of the medicines you take. How can you care for yourself at home? Learn about breastfeeding · Breastfeeding is best for your baby and good for you. · Breast milk has antibodies to help your baby fight infections. · Mothers who breastfeed often lose weight faster, because making milk burns calories. · Learning the best ways to hold your baby will make breastfeeding easier. · Let your partner bathe and diaper the baby to keep your partner from feeling left out. Snuggle together when you breastfeed. · You may want to learn how to use a breast pump and store your milk. · If you choose to bottle feed, make the feeding feel like breastfeeding so you can bond with your baby. Always hold your baby and the bottle. Do not prop bottles or let your baby fall asleep with a bottle. Learn about crying · It is common for babies to cry for 1 to 3 hours a day. Some cry more, some cry less. · Babies don't cry to make you upset or because you are a bad parent. · Crying is how your baby communicates.  Your baby may be hungry; have gas; need a diaper change; or feel cold, warm, tired, lonely, or tense. Sometimes babies cry for unknown reasons. · If you respond to your baby's needs, he or she will learn to trust you. · Try to stay calm when your baby cries. Your baby may get more upset if he or she senses that you are upset. Know how to care for your  · Your baby's umbilical cord stump will drop off on its own, usually between 1 and 2 weeks. To care for your baby's umbilical cord area: ¨ Clean the area at the bottom of the cord 2 or 3 times a day. ¨ Pay special attention to the area where the cord attaches to the skin. ¨ Keep the diaper folded below the cord. ¨ Use a damp washcloth or cotton ball to sponge bathe your baby until the stump has come off. · Your baby's first dark stool is called meconium. After the meconium is passed, your baby will develop his or her own bowel pattern. ¨ Some babies, especially  babies, have several bowel movements a day. Others have one or two a day, or one every 2 to 3 days. ¨  babies often have loose, yellow stools. Formula-fed babies have more formed stools. ¨ If your baby's stools look like little pellets, he or she is constipated. After 2 days of constipation, call your baby's doctor. · If your baby will be circumcised, you can care for him at home. ¨ Gently rinse his penis with warm water after every diaper change. Do not try to remove the film that forms on the penis. This film will go away on its own. Pat dry. ¨ Put petroleum ointment, such as Vaseline, on the area of the diaper that will touch your baby's penis. This will keep the diaper from sticking to your baby. ¨ Ask the doctor about giving your baby acetaminophen (Tylenol) for pain. Where can you learn more? Go to http://chris-danette.info/. Enter 02  21 in the search box to learn more about \"Week 37 of Your Pregnancy: Care Instructions. \" Current as of: 2017 Content Version: 11.4 © 9082-0829 Healthwise, Incorporated. Care instructions adapted under license by [employer] (which disclaims liability or warranty for this information). If you have questions about a medical condition or this instruction, always ask your healthcare professional. [address] any warranty or liability for your use of this information. Introducing [facility] & HEALTH SERVICES! Dear Elise Like: Thank you for requesting a Nodej[employer] account. Our records indicate that you already have an active Nodejitsu account. You can access your account anytime at [email or web address]. Related Content Database ([email or web address])/Rentalutions Did you know that you can access your hospital and ER discharge instructions at any time in Nodejitsu? You can also review all of your test results from your hospital stay or ER visit. Additional Information If you have questions, please visit the Frequently Asked Questions section of the Nodejitsu website at [email or web address] Remember, Nodejitsu is NOT to be used for urgent needs. For medical emergencies, dial 911. Now available from your iPhone and Android! Please provide this summary of care documentation to your next provider. Your primary care clinician is listed as PROVIDER UNKNOWN. If you have any questions after today's visit, please call [phone].

## 2018-03-16 NOTE — PROGRESS NOTES
[facility]  [email or web address]  [phone]    Luis Banda MD, FACOG     Follow-up OB visit    Chief Complaint   Patient presents with    Routine Prenatal Visit       Vitals:    18 0936   BP: 118/72   Weight: 295 lb (133.8 kg)   Height: 5' 7\" (1.702 m)       Patient Active Problem List    Diagnosis Date Noted    Prenatal care of primigravida, antepartum 2017       The patient reports the following concerns: none    See PN flowsheet for exam    29 y.o.  37w0d   No diagnosis found. [] SAB/bleeding precautions reviewed   [] PTL/PPROM precautions reviewed   [x] Labor precautions reviewed   [x] Fetal kick counts discussed   [] Labs reviewed with patient   [] Narda Garduno precautions reviewed   [] Consent reviewed   [] Handouts given to pt   [] Glucola handout    [] GBS/labor/Magic Hour handout   []    []    []    []    Follow-up Disposition:  Return in about 1 week (around 3/23/2018) for Follow up OB visit. No orders of the defined types were placed in this encounter.       Luis Banda MD

## 2018-03-22 ENCOUNTER — ROUTINE PRENATAL (OUTPATIENT)
Dept: OBGYN CLINIC | Age: 33
End: 2018-03-22

## 2018-03-22 VITALS
SYSTOLIC BLOOD PRESSURE: 120 MMHG | HEIGHT: 67 IN | DIASTOLIC BLOOD PRESSURE: 78 MMHG | WEIGHT: 293 LBS | BODY MASS INDEX: 45.99 KG/M2

## 2018-03-22 DIAGNOSIS — Z34.00 PRENATAL CARE OF PRIMIGRAVIDA, ANTEPARTUM: Primary | ICD-10-CM

## 2018-03-22 NOTE — PROGRESS NOTES
_ [address] [facility]  [email or web address]  [phone]    Pati Franz MD, FACOG     Follow-up OB visit    Chief Complaint   Patient presents with    Routine Prenatal Visit       Vitals:    18 0943   BP: 120/78   Weight: 298 lb (135.2 kg)   Height: 5' 7\" (1.702 m)       Patient Active Problem List    Diagnosis Date Noted    Prenatal care of primigravida, antepartum 2017       The patient reports the following concerns: increased pressure, saul yu contractions and vaginal discharge. See PN flowsheet for exam    29 y.o.  37w6d   Encounter Diagnosis   Name Primary?  Prenatal care of primigravida, antepartum Yes     Disc call/leave coverage   [] SAB/bleeding precautions reviewed   [] PTL/PPROM precautions reviewed   [x] Labor precautions reviewed   [x] Fetal kick counts discussed   [] Labs reviewed with patient   [] Authur Griffon precautions reviewed   [] Consent reviewed   [] Handouts given to pt   [] Glucola handout    [] GBS/labor/Magic Hour handout   []    []    []    []    Follow-up Disposition:  Return in about 1 week (around 3/29/2018) for Follow up OB visit. No orders of the defined types were placed in this encounter.       Pati Franz MD

## 2018-03-22 NOTE — MR AVS SNAPSHOT
[facility] [address] [place] [address] 
[phone] Patient: Will Ayala MRN: [id number] WBV:[id number] Visit Information Date & Time Provider Department Dept. Phone Encounter #  
 3/22/2018  9:40 AM MD Brandon Baumann  Your Appointments 3/30/2018  9:30 AM  
PROCEDURE with MD Brandon Baumann ([address] [place] [address] Road) Appt Note: Repeat C/S  
 [address] [facility] [address] [place] [address]  
[phone]  
  
   
 [address]  
  
    
  
 3/30/2018  8:40 AM  
OB VISIT with MD Brandon Baumann ([address] [place] [address] Road) Appt Note: 1wk fob TP  
 [address] [facility] [address] [place] [address]  
[phone]  
  
   
 [address] [place] [address] Road [address] [place] [address]  
  
    
 4/6/2018  9:30 AM  
OB VISIT with MD Brandon Boyd ([address] [place] [address] Road) Appt Note: 1wk fob TP pt - rotate [address] [place] [address] Road [address] [facility] [phone]  
[facility] 31 [address]33 [address] Upcoming Health Maintenance Date Due  
 PAP AKA CERVICAL CYTOLOGY 8/28/2020 Allergies as of 3/22/2018  Review Complete On: 3/22/2018 By: Mayito Franco Severity Noted Reaction Type Reactions Royce High 08/28/2017    Anaphylaxis Current Immunizations  Never Reviewed Name Date Influenza Vaccine (Quad) PF 10/10/2017 Tdap 2/1/2018 Not reviewed this visit Vitals BP Height(growth percentile) Weight(growth percentile) LMP BMI OB Status 120/78 5' 7\" (1.702 m) 298 lb (135.2 kg) 06/30/2017 (Exact Date) 46.67 kg/m2 Pregnant Smoking Status Never Smoker BMI and BSA Data  Body Mass Index Body Surface Area  
 46.67 kg/m 2 2.53 m 2  
  
  
 Preferred Pharmacy Pharmacy Name Phone [id number]- [place], [address] [facility] [phone] Your Updated Medication List  
  
   
This list is accurate as of 3/22/18  9:49 AM.  Always use your most recent med list. AMBULATORY BREAST PUMP Use as directed BENADRYL 25 mg capsule Generic drug:  diphenhydrAMINE Take 25 mg by mouth every six (6) hours as needed. PRENATAL 1 + IRON PO Take  by mouth. ZANTAC 150 mg tablet Generic drug:  raNITIdine Take 150 mg by mouth two (2) times a day. To-Do List   
 03/23/2018 8:00 AM  
  Appointment with ULTRASOUND 3 SFM at [facility] ([phone]) Patient Instructions Week 37 of Your Pregnancy: Care Instructions Your Care Instructions You are near the end of your pregnancy-and you're probably pretty uncomfortable. It may be harder to walk around. Lying down probably isn't comfortable either. You may have trouble getting to sleep or staying asleep. Most women deliver their babies between 40 and 41 weeks. This is a good time to think about packing a bag for the hospital with items you'll need. Then you'll be ready when labor starts. Follow-up care is a key part of your treatment and safety. Be sure to make and go to all appointments, and call your doctor if you are having problems. It's also a good idea to know your test results and keep a list of the medicines you take. How can you care for yourself at home? Learn about breastfeeding · Breastfeeding is best for your baby and good for you. · Breast milk has antibodies to help your baby fight infections. · Mothers who breastfeed often lose weight faster, because making milk burns calories. · Learning the best ways to hold your baby will make breastfeeding easier.  
· Let your partner bathe and diaper the baby to keep your partner from feeling left out. Snuggle together when you breastfeed. · You may want to learn how to use a breast pump and store your milk. · If you choose to bottle feed, make the feeding feel like breastfeeding so you can bond with your baby. Always hold your baby and the bottle. Do not prop bottles or let your baby fall asleep with a bottle. Learn about crying · It is common for babies to cry for 1 to 3 hours a day. Some cry more, some cry less. · Babies don't cry to make you upset or because you are a bad parent. · Crying is how your baby communicates. Your baby may be hungry; have gas; need a diaper change; or feel cold, warm, tired, lonely, or tense. Sometimes babies cry for unknown reasons. · If you respond to your baby's needs, he or she will learn to trust you. · Try to stay calm when your baby cries. Your baby may get more upset if he or she senses that you are upset. Know how to care for your  · Your baby's umbilical cord stump will drop off on its own, usually between 1 and 2 weeks. To care for your baby's umbilical cord area: ¨ Clean the area at the bottom of the cord 2 or 3 times a day. ¨ Pay special attention to the area where the cord attaches to the skin. ¨ Keep the diaper folded below the cord. ¨ Use a damp washcloth or cotton ball to sponge bathe your baby until the stump has come off. · Your baby's first dark stool is called meconium. After the meconium is passed, your baby will develop his or her own bowel pattern. ¨ Some babies, especially  babies, have several bowel movements a day. Others have one or two a day, or one every 2 to 3 days. ¨  babies often have loose, yellow stools. Formula-fed babies have more formed stools. ¨ If your baby's stools look like little pellets, he or she is constipated. After 2 days of constipation, call your baby's doctor. · If your baby will be circumcised, you can care for him at home. ¨ Gently rinse his penis with warm water after every diaper change. Do not try to remove the film that forms on the penis. This film will go away on its own. Pat dry. ¨ Put petroleum ointment, such as Vaseline, on the area of the diaper that will touch your baby's penis. This will keep the diaper from sticking to your baby. ¨ Ask the doctor about giving your baby acetaminophen (Tylenol) for pain. Where can you learn more? Go to http://chris-danette.info/. Enter 68 21 97 in the search box to learn more about \"Week 37 of Your Pregnancy: Care Instructions. \" Current as of: March 16, 2017 Content Version: 11.4 © 6664-9822 Nextreme Thermal Solutions. Care instructions adapted under license by Iridian Technologies (which disclaims liability or warranty for this information). If you have questions about a medical condition or this instruction, always ask your healthcare professional. [place] [address] any warranty or liability for your use of this information. Introducing [facility] & HEALTH SERVICES! Dear Filiberto Ramos: Thank you for requesting a Z[employer] account. Our records indicate that you already have an active Z[employer] account. You can access your account anytime at [email or web address] Healthcare Did you know that you can access your hospital and ER discharge instructions at any time in Z[employer]raz Technology? You can also review all of your test results from your hospital stay or ER visit. Additional Information If you have questions, please visit the Frequently Asked Questions section of the [employer] website at [email or web address] Remember, Z[employer] is NOT to be used for urgent needs. For medical emergencies, dial 911. Now available from your iPhone and Android! Please provide this summary of care documentation to your next provider. Your primary care clinician is listed as PROVIDER UNKNOWN.  If you have any questions after today's visit, please call [phone].

## 2018-03-22 NOTE — PATIENT INSTRUCTIONS
Week 37 of Your Pregnancy: Care Instructions  Your Care Instructions    You are near the end of your pregnancy-and you're probably pretty uncomfortable. It may be harder to walk around. Lying down probably isn't comfortable either. You may have trouble getting to sleep or staying asleep. Most women deliver their babies between 40 and 41 weeks. This is a good time to think about packing a bag for the hospital with items you'll need. Then you'll be ready when labor starts. Follow-up care is a key part of your treatment and safety. Be sure to make and go to all appointments, and call your doctor if you are having problems. It's also a good idea to know your test results and keep a list of the medicines you take. How can you care for yourself at home? Learn about breastfeeding  · Breastfeeding is best for your baby and good for you. · Breast milk has antibodies to help your baby fight infections. · Mothers who breastfeed often lose weight faster, because making milk burns calories. · Learning the best ways to hold your baby will make breastfeeding easier. · Let your partner bathe and diaper the baby to keep your partner from feeling left out. Snuggle together when you breastfeed. · You may want to learn how to use a breast pump and store your milk. · If you choose to bottle feed, make the feeding feel like breastfeeding so you can bond with your baby. Always hold your baby and the bottle. Do not prop bottles or let your baby fall asleep with a bottle. Learn about crying  · It is common for babies to cry for 1 to 3 hours a day. Some cry more, some cry less. · Babies don't cry to make you upset or because you are a bad parent. · Crying is how your baby communicates. Your baby may be hungry; have gas; need a diaper change; or feel cold, warm, tired, lonely, or tense. Sometimes babies cry for unknown reasons. · If you respond to your baby's needs, he or she will learn to trust you.   · Try to stay calm when your baby cries. Your baby may get more upset if he or she senses that you are upset. Know how to care for your   · Your baby's umbilical cord stump will drop off on its own, usually between 1 and 2 weeks. To care for your baby's umbilical cord area:  ¨ Clean the area at the bottom of the cord 2 or 3 times a day. ¨ Pay special attention to the area where the cord attaches to the skin. ¨ Keep the diaper folded below the cord. ¨ Use a damp washcloth or cotton ball to sponge bathe your baby until the stump has come off. · Your baby's first dark stool is called meconium. After the meconium is passed, your baby will develop his or her own bowel pattern. ¨ Some babies, especially  babies, have several bowel movements a day. Others have one or two a day, or one every 2 to 3 days. ¨  babies often have loose, yellow stools. Formula-fed babies have more formed stools. ¨ If your baby's stools look like little pellets, he or she is constipated. After 2 days of constipation, call your baby's doctor. · If your baby will be circumcised, you can care for him at home. ¨ Gently rinse his penis with warm water after every diaper change. Do not try to remove the film that forms on the penis. This film will go away on its own. Pat dry. ¨ Put petroleum ointment, such as Vaseline, on the area of the diaper that will touch your baby's penis. This will keep the diaper from sticking to your baby. ¨ Ask the doctor about giving your baby acetaminophen (Tylenol) for pain. Where can you learn more? Go to http://chris-danette.info/. Enter 68 21 97 in the search box to learn more about \"Week 37 of Your Pregnancy: Care Instructions. \"  Current as of: 2017  Content Version: 11.4  © 5717-6050 BidRazor. Care instructions adapted under license by [employer] (which disclaims liability or warranty for this information).  If you have questions about a medical condition or this instruction, always ask your healthcare professional. [place] [address] any warranty or liability for your use of this information.

## 2018-03-23 ENCOUNTER — HOSPITAL ENCOUNTER (INPATIENT)
Age: 33
LOS: 2 days | Discharge: HOME | End: 2018-03-25
Attending: OBSTETRICS & GYNECOLOGY | Admitting: OBSTETRICS & GYNECOLOGY
Payer: COMMERCIAL

## 2018-03-23 ENCOUNTER — HOSPITAL ENCOUNTER (OUTPATIENT)
Dept: PERINATAL CARE | Age: 33
Discharge: HOME | End: 2018-03-23
Attending: OBSTETRICS & GYNECOLOGY
Payer: COMMERCIAL

## 2018-03-23 ENCOUNTER — ROUTINE PRENATAL (OUTPATIENT)
Dept: OBGYN CLINIC | Age: 33
End: 2018-03-23

## 2018-03-23 ENCOUNTER — ANESTHESIA EVENT (OUTPATIENT)
Dept: LABOR AND DELIVERY | Age: 33
End: 2018-03-23
Payer: COMMERCIAL

## 2018-03-23 ENCOUNTER — ANESTHESIA (OUTPATIENT)
Dept: LABOR AND DELIVERY | Age: 33
End: 2018-03-23
Payer: COMMERCIAL

## 2018-03-23 VITALS — SYSTOLIC BLOOD PRESSURE: 148 MMHG | WEIGHT: 293 LBS | DIASTOLIC BLOOD PRESSURE: 90 MMHG | BODY MASS INDEX: 46.83 KG/M2

## 2018-03-23 DIAGNOSIS — Z34.00 PRENATAL CARE OF PRIMIGRAVIDA, ANTEPARTUM: Primary | ICD-10-CM

## 2018-03-23 PROBLEM — O41.00X0 OLIGOHYDRAMNIOS: Status: ACTIVE | Noted: 2018-03-23

## 2018-03-23 LAB
BASOPHILS # BLD: 0 K/UL (ref 0–0.1)
BASOPHILS NFR BLD: 0 % (ref 0–1)
DIFFERENTIAL METHOD BLD: ABNORMAL
EOSINOPHIL # BLD: 0.1 K/UL (ref 0–0.4)
EOSINOPHIL NFR BLD: 1 % (ref 0–7)
ERYTHROCYTE [DISTWIDTH] IN BLOOD BY AUTOMATED COUNT: 12.9 % (ref 11.5–14.5)
HCT VFR BLD AUTO: 39.6 % (ref 35–47)
HGB BLD-MCNC: 14.2 G/DL (ref 11.5–16)
IMM GRANULOCYTES # BLD: 0.1 K/UL (ref 0–0.04)
IMM GRANULOCYTES NFR BLD AUTO: 1 % (ref 0–0.5)
LYMPHOCYTES # BLD: 2.4 K/UL (ref 0.8–3.5)
LYMPHOCYTES NFR BLD: 21 % (ref 12–49)
MCH RBC QN AUTO: 35.4 PG (ref 26–34)
MCHC RBC AUTO-ENTMCNC: 35.9 G/DL (ref 30–36.5)
MCV RBC AUTO: 98.8 FL (ref 80–99)
MONOCYTES # BLD: 0.7 K/UL (ref 0–1)
MONOCYTES NFR BLD: 6 % (ref 5–13)
NEUTS SEG # BLD: 8.5 K/UL (ref 1.8–8)
NEUTS SEG NFR BLD: 72 % (ref 32–75)
NRBC # BLD: 0 K/UL (ref 0–0.01)
NRBC BLD-RTO: 0 PER 100 WBC
PLATELET # BLD AUTO: 276 K/UL (ref 150–400)
PMV BLD AUTO: 10.9 FL (ref 8.9–12.9)
RBC # BLD AUTO: 4.01 M/UL (ref 3.8–5.2)
WBC # BLD AUTO: 11.8 K/UL (ref 3.6–11)

## 2018-03-23 PROCEDURE — 76818 FETAL BIOPHYS PROFILE W/NST: CPT | Performed by: OBSTETRICS & GYNECOLOGY

## 2018-03-23 PROCEDURE — 65270000029 HC RM PRIVATE

## 2018-03-23 PROCEDURE — 3E033VJ INTRODUCTION OF OTHER HORMONE INTO PERIPHERAL VEIN, PERCUTANEOUS APPROACH: ICD-10-PCS | Performed by: OBSTETRICS & GYNECOLOGY

## 2018-03-23 PROCEDURE — 3E0R3BZ INTRODUCTION OF ANESTHETIC AGENT INTO SPINAL CANAL, PERCUTANEOUS APPROACH: ICD-10-PCS | Performed by: ANESTHESIOLOGY

## 2018-03-23 PROCEDURE — 76816 OB US FOLLOW-UP PER FETUS: CPT | Performed by: OBSTETRICS & GYNECOLOGY

## 2018-03-23 PROCEDURE — 74011000250 HC RX REV CODE- 250

## 2018-03-23 PROCEDURE — 36415 COLL VENOUS BLD VENIPUNCTURE: CPT | Performed by: OBSTETRICS & GYNECOLOGY

## 2018-03-23 PROCEDURE — 4A0HXCZ MEASUREMENT OF PRODUCTS OF CONCEPTION, CARDIAC RATE, EXTERNAL APPROACH: ICD-10-PCS | Performed by: OBSTETRICS & GYNECOLOGY

## 2018-03-23 PROCEDURE — 75410000003 HC RECOV DEL/VAG/CSECN EA 0.5 HR: Performed by: ADVANCED PRACTICE MIDWIFE

## 2018-03-23 PROCEDURE — 0HQ9XZZ REPAIR PERINEUM SKIN, EXTERNAL APPROACH: ICD-10-PCS | Performed by: OBSTETRICS & GYNECOLOGY

## 2018-03-23 PROCEDURE — 85025 COMPLETE CBC W/AUTO DIFF WBC: CPT | Performed by: OBSTETRICS & GYNECOLOGY

## 2018-03-23 PROCEDURE — 74011250636 HC RX REV CODE- 250/636: Performed by: ANESTHESIOLOGY

## 2018-03-23 PROCEDURE — 77030011943

## 2018-03-23 PROCEDURE — 10907ZC DRAINAGE OF AMNIOTIC FLUID, THERAPEUTIC FROM PRODUCTS OF CONCEPTION, VIA NATURAL OR ARTIFICIAL OPENING: ICD-10-PCS | Performed by: OBSTETRICS & GYNECOLOGY

## 2018-03-23 PROCEDURE — 74011250636 HC RX REV CODE- 250/636

## 2018-03-23 PROCEDURE — 59200 INSERT CERVICAL DILATOR: CPT | Performed by: ADVANCED PRACTICE MIDWIFE

## 2018-03-23 PROCEDURE — 74011250636 HC RX REV CODE- 250/636: Performed by: OBSTETRICS & GYNECOLOGY

## 2018-03-23 PROCEDURE — 75410000002 HC LABOR FEE PER 1 HR: Performed by: ADVANCED PRACTICE MIDWIFE

## 2018-03-23 PROCEDURE — 77030014125 HC TY EPDRL BBMI -B: Performed by: ANESTHESIOLOGY

## 2018-03-23 PROCEDURE — 74011000250 HC RX REV CODE- 250: Performed by: ANESTHESIOLOGY

## 2018-03-23 PROCEDURE — 75410000000 HC DELIVERY VAGINAL/SINGLE: Performed by: ADVANCED PRACTICE MIDWIFE

## 2018-03-23 PROCEDURE — 76060000078 HC EPIDURAL ANESTHESIA: Performed by: ANESTHESIOLOGY

## 2018-03-23 PROCEDURE — 74011000258 HC RX REV CODE- 258: Performed by: OBSTETRICS & GYNECOLOGY

## 2018-03-23 PROCEDURE — 77010026065 HC OXYGEN MINIMUM MEDICAL AIR: Performed by: ADVANCED PRACTICE MIDWIFE

## 2018-03-23 PROCEDURE — 00HU33Z INSERTION OF INFUSION DEVICE INTO SPINAL CANAL, PERCUTANEOUS APPROACH: ICD-10-PCS | Performed by: ANESTHESIOLOGY

## 2018-03-23 RX ORDER — EPHEDRINE SULFATE 50 MG/ML
10 INJECTION, SOLUTION INTRAVENOUS
Status: COMPLETED | OUTPATIENT
Start: 2018-03-23 | End: 2018-03-23

## 2018-03-23 RX ORDER — LIDOCAINE HYDROCHLORIDE 10 MG/ML
INJECTION INFILTRATION; PERINEURAL
Status: COMPLETED
Start: 2018-03-23 | End: 2018-03-23

## 2018-03-23 RX ORDER — SODIUM CHLORIDE, SODIUM LACTATE, POTASSIUM CHLORIDE, CALCIUM CHLORIDE 600; 310; 30; 20 MG/100ML; MG/100ML; MG/100ML; MG/100ML
125 INJECTION, SOLUTION INTRAVENOUS CONTINUOUS
Status: DISCONTINUED | OUTPATIENT
Start: 2018-03-23 | End: 2018-03-24

## 2018-03-23 RX ORDER — NALOXONE HYDROCHLORIDE 0.4 MG/ML
0.4 INJECTION, SOLUTION INTRAMUSCULAR; INTRAVENOUS; SUBCUTANEOUS AS NEEDED
Status: DISCONTINUED | OUTPATIENT
Start: 2018-03-23 | End: 2018-03-24

## 2018-03-23 RX ORDER — ONDANSETRON 2 MG/ML
INJECTION INTRAMUSCULAR; INTRAVENOUS
Status: COMPLETED
Start: 2018-03-23 | End: 2018-03-23

## 2018-03-23 RX ORDER — OXYTOCIN IN 5 % DEXTROSE 30/500 ML
.5-2 PLASTIC BAG, INJECTION (ML) INTRAVENOUS
Status: DISCONTINUED | OUTPATIENT
Start: 2018-03-23 | End: 2018-03-24

## 2018-03-23 RX ORDER — NALOXONE HYDROCHLORIDE 0.4 MG/ML
0.4 INJECTION, SOLUTION INTRAMUSCULAR; INTRAVENOUS; SUBCUTANEOUS ONCE
Status: DISCONTINUED | OUTPATIENT
Start: 2018-03-23 | End: 2018-03-24

## 2018-03-23 RX ORDER — SODIUM CHLORIDE 0.9 % (FLUSH) 0.9 %
5-10 SYRINGE (ML) INJECTION AS NEEDED
Status: DISCONTINUED | OUTPATIENT
Start: 2018-03-23 | End: 2018-03-24

## 2018-03-23 RX ORDER — BUTORPHANOL TARTRATE 2 MG/ML
2 INJECTION INTRAMUSCULAR; INTRAVENOUS
Status: COMPLETED | OUTPATIENT
Start: 2018-03-23 | End: 2018-03-23

## 2018-03-23 RX ORDER — BUPIVACAINE HYDROCHLORIDE 2.5 MG/ML
INJECTION, SOLUTION EPIDURAL; INFILTRATION; INTRACAUDAL AS NEEDED
Status: DISCONTINUED | OUTPATIENT
Start: 2018-03-23 | End: 2018-03-23 | Stop reason: HOSPADM

## 2018-03-23 RX ORDER — FENTANYL/BUPIVACAINE/NS/PF 2-1250MCG
10 PREFILLED PUMP RESERVOIR EPIDURAL CONTINUOUS
Status: DISCONTINUED | OUTPATIENT
Start: 2018-03-23 | End: 2018-03-24

## 2018-03-23 RX ORDER — MAG HYDROX/ALUMINUM HYD/SIMETH 200-200-20
30 SUSPENSION, ORAL (FINAL DOSE FORM) ORAL
Status: DISCONTINUED | OUTPATIENT
Start: 2018-03-23 | End: 2018-03-24

## 2018-03-23 RX ORDER — ONDANSETRON 2 MG/ML
4 INJECTION INTRAMUSCULAR; INTRAVENOUS
Status: DISCONTINUED | OUTPATIENT
Start: 2018-03-23 | End: 2018-03-24

## 2018-03-23 RX ADMIN — PROMETHAZINE HYDROCHLORIDE 25 MG: 25 INJECTION INTRAMUSCULAR; INTRAVENOUS at 12:00

## 2018-03-23 RX ADMIN — Medication 12 ML/HR: at 16:18

## 2018-03-23 RX ADMIN — Medication 2 MILLI-UNITS/MIN: at 11:19

## 2018-03-23 RX ADMIN — SODIUM CHLORIDE, SODIUM LACTATE, POTASSIUM CHLORIDE, AND CALCIUM CHLORIDE 999 ML/HR: 600; 310; 30; 20 INJECTION, SOLUTION INTRAVENOUS at 15:51

## 2018-03-23 RX ADMIN — Medication 6 ML/HR: at 19:43

## 2018-03-23 RX ADMIN — LIDOCAINE HYDROCHLORIDE 10 ML: 10 INJECTION, SOLUTION INFILTRATION; PERINEURAL at 23:39

## 2018-03-23 RX ADMIN — EPHEDRINE SULFATE 10 MG: 50 INJECTION, SOLUTION INTRAVENOUS at 16:46

## 2018-03-23 RX ADMIN — EPHEDRINE SULFATE 10 MG: 50 INJECTION, SOLUTION INTRAVENOUS at 16:34

## 2018-03-23 RX ADMIN — ONDANSETRON 4 MG: 2 INJECTION INTRAMUSCULAR; INTRAVENOUS at 20:02

## 2018-03-23 RX ADMIN — EPHEDRINE SULFATE 10 MG: 50 INJECTION, SOLUTION INTRAVENOUS at 16:58

## 2018-03-23 RX ADMIN — BUTORPHANOL TARTRATE 2 MG: 2 INJECTION, SOLUTION INTRAMUSCULAR; INTRAVENOUS at 11:53

## 2018-03-23 RX ADMIN — SODIUM CHLORIDE, SODIUM LACTATE, POTASSIUM CHLORIDE, AND CALCIUM CHLORIDE 250 ML: 600; 310; 30; 20 INJECTION, SOLUTION INTRAVENOUS at 21:50

## 2018-03-23 RX ADMIN — BUPIVACAINE HYDROCHLORIDE 10 ML: 2.5 INJECTION, SOLUTION EPIDURAL; INFILTRATION; INTRACAUDAL at 16:09

## 2018-03-23 RX ADMIN — SODIUM CHLORIDE, SODIUM LACTATE, POTASSIUM CHLORIDE, AND CALCIUM CHLORIDE 125 ML/HR: 600; 310; 30; 20 INJECTION, SOLUTION INTRAVENOUS at 21:07

## 2018-03-23 RX ADMIN — SODIUM CHLORIDE, SODIUM LACTATE, POTASSIUM CHLORIDE, AND CALCIUM CHLORIDE 125 ML/HR: 600; 310; 30; 20 INJECTION, SOLUTION INTRAVENOUS at 11:19

## 2018-03-23 NOTE — PROGRESS NOTES
03/23/18 1:12 PM  CM met with patient and her /FOB Manolo Payne ([phone]) to complete initial assessment and to begin discharge planning. Demographics were reviewed and confirmed. Patient works for [employer] and will have 18 weeks off from work. FOB will have 2 weeks and then a flexible schedule. MOB's parents and FOB's dad live close and will provide support to the couple and baby. Patient has car seat, crib, clothing, and other necessary supplies. Patient plans to breastfeed and has a pump to use. A pediatrician has not yet been selected, a list was provided for the parents to begin exploring. Denied need for [facility] and Medicaid services.   Care Management Interventions  PCP Verified by CM:  (provided [facility] list)  Mode of Transport at Discharge: Self  Transition of Care Consult (CM Consult): Discharge Planning  Current Support Network: Lives with Spouse, Family Lives Nearby  Confirm Follow Up Transport: Family  Plan discussed with Pt/Family/Caregiver: Yes  Discharge Location  Discharge Placement: Home with family assistance  JANEE Jung

## 2018-03-23 NOTE — IP AVS SNAPSHOT
[address] 
[phone] Patient: Jennifer Becerra MRN: [id number] VBE:[id number] About your hospitalization You were admitted on:  March 23, 2018 You [facility] received care in the:  [facility] [address] You were discharged on:  March 25, 2018 Why you were hospitalized Your primary diagnosis was:  Not on File Your diagnoses also included:  Oligohydramnios Follow-up Information Follow up With Details Comments Contact Info Provider Unknown   Patient not available to ask Your Scheduled Appointments Friday March 30, 2018  8:40 AM EDT  
OB VISIT with MD Brandon Munoz ([address] [place] [address]) [address] [place] [address] [address] [place] [address]  
[phone] Friday March 30, 2018  9:30 AM EDT PROCEDURE with MD Brandon Munoz ([address] [place] [address]) [address] [place] [address] [address] [place] [address]  
[phone] Friday April 06, 2018  9:30 AM EDT  
OB VISIT with MD Brandon Werner ([address] [place] [address]) [address] [place] [address] [address] [place] [address]  
[phone] Discharge Orders None A check erick indicates which time of day the medication should be taken. My Medications CONTINUE taking these medications Instructions Each Dose to Equal  
 Morning Noon Evening Bedtime PRENATAL 1 + IRON PO Your last dose was: Your next dose is: Take  by mouth. ZANTAC 150 mg tablet Generic drug:  raNITIdine Your last dose was: Your next dose is: Take 150 mg by mouth two (2) times a day. 150 mg  
    
   
   
   
  
  
STOP taking these medications BENADRYL 25 mg capsule Generic drug:  diphenhydrAMINE ASK your doctor about these medications Instructions Each Dose to Equal  
 Morning Noon Evening Bedtime AMBULATORY BREAST PUMP Your last dose was: Your next dose is:    
   
   
 Use as directed Discharge Instructions Medications:  
*Ibuprofen (over the counter) up to 600 mg every 6 hours as needed for pain or cramping *Continue Prenatal vitamins and DHA supplements while breastfeeding *You may use a stool softener if needed until comfortable with bowel movements, may take up to 100 mg of docusate sodium (Colace- over the counter) twice daily Appointments: *Follow-up with CNM in 2 to 6 weeks as directed Your Care Instructions Congratulations on the birth of your baby. Like pregnancy, the  period can be a time of excitement, vania, and exhaustion. You may look at your wondrous little baby and feel happy. You may also be overwhelmed by your new sleep hours and new responsibilities. At first, babies often sleep during the days and are awake at night. They do not have a pattern or routine. They may make sudden gasps, jerk themselves awake, or look like they have crossed eyes. These are all normal, and they may even make you smile. In these first weeks after delivery, try to take good care of yourself. It may take 4 to 6 weeks to feel like yourself again, and possibly longer if you had a  birth. You will likely feel very tired for several weeks. Your days will be full of ups and downs, but lots of vania as well. Follow-up care is a key part of your treatment and safety. Be sure to make and go to all appointments, and call your midwife if you are having problems. It's also a good idea to know your test results and keep a list of the medicines you take. How can you care for yourself at home? Take care of your body after delivery · Use pads instead of tampons for the bloody flow that may last as long as 2 weeks. · Ease cramps with ibuprofen (Advil). · Ease soreness of hemorrhoids and the area between your vagina and rectum with ice compresses or witch hazel pads. · Ease constipation by drinking lots of fluid and eating high-fiber foods. Ask your midwife about over-the-counter stool softeners. · Cleanse yourself with a gentle squeeze of warm water from a bottle instead of wiping with toilet paper. · Take a sitz bath in warm water several times a day. · Wear a good nursing bra. Ease sore and swollen breasts with warm, wet washcloths. · If you are not breast-feeding, use ice rather than heat for breast soreness. · Your period may not start for several months if you are breast-feeding. You may bleed more, and longer at first, than you did before you got pregnant. · Wait until you are healed (about 4 to 6 weeks) before you have sexual intercourse. · Try not to travel with your baby for 5 or 6 weeks. If you take a long car trip, make frequent stops to walk around and stretch. Avoid exhaustion · Rest every day. Try to nap when your baby naps. · Ask another adult to be with you for a few days after delivery. · Plan for  if you have other children. · Stay flexible so you can eat at odd hours and sleep when you need to. Both you and your baby are making new schedules. · Plan small trips to get out of the house. Change can make you feel less tired. · Ask for help with housework, cooking, and shopping. Remind yourself that your job is to care for your baby. Know about help for postpartum depression · \"Baby blues are common for the first 1 to 2 weeks after birth. You may cry or feel sad or irritable for no reason. · Rest whenever you can. Being tired makes it harder to handle your emotions. · Go for walks with your baby. · Talk to your partner, friends, and family about your feelings. · If your symptoms last for more than a few weeks, or if you feel very depressed, ask your doctor for help. · Postpartum depression can be treated. Support groups and counseling can help. Sometimes medicine can also help. Stay healthy · Eat healthy foods so you have more energy, make good breast milk, and lose extra baby pounds. · If you breast-feed, avoid alcohol and drugs. Stay smoke-free. If you quit during pregnancy, congratulations. · Start daily exercise after 4 to 6 weeks, but rest when you feel tired. · Learn exercises to tone your belly. Do Kegel exercises to regain strength in your pelvic muscles. You can do these exercises while you stand or sit. ¨ Squeeze the same muscles you would use to stop your urine. Your belly and rear end (buttocks) should not move. ¨ Hold the squeeze for 3 seconds, then relax for 3 seconds. ¨ Repeat the exercise 10 to 15 times for each session. Do three or more sessions each day. · Find a class for new mothers and new babies that has an exercise time. · If you had a  birth, give yourself a bit more time before you exercise, and be careful. When should you call for help? Call 911 anytime you think you may need emergency care. For example, call if: 
· You have sudden, severe pain in your belly. · You passed out (lost consciousness). Call your provider now or seek immediate medical care if: 
· You have severe vaginal bleeding. You are passing blood clots and soaking through a pad each hour for 2 or more hours. · Your vaginal bleeding seems to be getting heavier or is still bright red 4 days after delivery, or you pass blood clots larger than the size of a golf ball. · You are dizzy or lightheaded, or you feel like you may faint. · You are vomiting or cannot keep fluids down. · You have a fever. · You have new or more belly pain. · You pass tissue (not just blood). · Your breast or breasts have hard, red, or tender areas. · You have an urgent or frequent need to urinate, along with a burning feeling. · You have severe pain, tenderness, or swelling in your vagina or the area between your rectum and vagina. · You have severe pains in your chest, belly, back, or legs. · You have feelings of severe despair or great anxiety. · Your baby is unusually cranky or is sleeping too much. · Your baby's eyes are red or have discharge. · Your baby has white patches on the roof and sides of the mouth or tongue. · Your baby's umbilical cord is foul-smelling, swollen, red, or leaking pus. · There is blood or mucus in your baby's bowel movements. · Your baby has fewer than 6 wet diapers a day. · Your baby does not want to eat, or your baby is throwing up with every feeding. · Your baby has trouble breathing. · Your baby has a rectal temperature of 100.4°F or more, or an underarm temperature over 99.4°F. 
· You baby has a low temperature less than 97.5°F rectal, or less than 97. 0°F underarm. · Your baby's skin looks yellow. Watch closely for changes in your health, and be sure to contact your doctor if you have any problems. Where can you learn more? Go to [employer].be Enter A461 in the search box to learn more about \"After Your Delivery (the Postpartum Period): After Your Visit. \"  
© 4023-0834 Healthwise, Incorporated. Care instructions adapted under license by Juan [employer] (which disclaims liability or warranty for this information). This care instruction is for use with your licensed healthcare professional. If you have questions about a medical condition or this instruction, always ask your healthcare professional. Gabi Infantee any warranty or liability for your use of this information. Content Version: 8.8.83285; Last Revised: July 8, 2010 Depression After Childbirth: After Your Birth Many women get the \"baby blues\" during the first few days after childbirth.  They may lose sleep, feel irritable, cry easily, and feel happy one minute and sad the next. Hormone changes are one cause of these emotional changes. Also, the demands of a new baby, coupled with visits from relatives or other family needs, add to a mother's stress. The \"baby blues\" usually peak around the fourth day and then ease up in less than 2 weeks. If your moodiness or anxiety lasts for more than 2 weeks, or if you feel like life is not worth living, you may have postpartum depression. This is different for each mother. Some mothers with serious depression may worry intensely about their infant's well-being, while others may feel distant from their child. Some mothers might even feel that they might harm their baby. A mother may have signs of paranoia, wondering if someone is watching her. Depression is not a sign of weakness. It is a medical condition that requires treatment. Medicine and counseling are often effective at reducing depression. Talk to your midwife about taking antidepressant medicine while breast-feeding. Follow-up care is a key part of your treatment and safety. Be sure to make and go to all appointments, and call your midwife if you are having problems. It's also a good idea to know your test results and keep a list of the medicines you take. How can you care for yourself at home? · Take your medicines exactly as prescribed. Call your provider if you think you are having a problem with your medicine. · Eat a balanced diet, so that you can keep up your energy. · Get regular daily exercise, such as walks, to help improve your mood. · Get as much sunlight as possible. Keep your shades and curtains open, and get outside as much as you can. · Avoid using alcohol or other substances to feel better. · Get as much rest and sleep as possible, and avoid doing too much. Being too tired can increase depression. · Play stimulating music throughout your day and soothing music at night. · Schedule outings and visits with friends and family. Ask them to call you regularly, so that you do not feel alone. · Ask for help with preparing food and other daily tasks. Family and friends are often happy to help a mother with a . · Be honest with yourself and those who care about you. Tell them about your struggle. · Join a support group of new mothers. No one can better understand the challenges of caring for a  than other new mothers. When should you call for help? Call 911 anytime you think you may need emergency care. For example, call if: 
· You feel you cannot stop from hurting yourself or someone else. Call your provider now or seek immediate medical care if: 
· You are having trouble caring for yourself or your baby. · You have signs of paranoia that can occur with postpartum depression. You fear that someone is watching you, stealing from you, or reading your mind. · You hear voices. · You have symptoms of postpartum depression, such as: ¨ Sleeplessness. ¨ Anxiety. ¨ Hopelessness. ¨ Irritability. ¨ Poor concentration. · Someone you know has depression and: 
¨ Starts to give away his or her possessions. ¨ Uses illegal drugs or drinks alcohol heavily. ¨ Talks or writes about death, including writing suicide notes and talking about guns, knives, or pills. ¨ Starts to spend a lot of time alone. ¨ Acts very aggressively or suddenly appears calm. Watch closely for changes in your health, and be sure to contact your doctor if you have any problems. Where can you learn more? Go to Pixel [employer].be Enter J097 in the search box to learn more about \"Depression After Childbirth: After Your Visit. \"  
© 6862-0040 Healthwise, Incorporated. Care instructions adapted under license by [employer] (which disclaims liability or warranty for this information).  This care instruction is for use with your licensed healthcare professional. If you have questions about a medical condition or this instruction, always ask your healthcare p POSTPARTUM DISCHARGE INSTRUCTIONS Name:  Emely Rajput YOB: 1985 Admission Diagnosis:  Pregnancy Breech Oligohydramnios Discharge Diagnosis:   
Problem List as of 3/25/2018  Date Reviewed: 3/23/2018 Codes Class Noted - Resolved Oligohydramnios ICD-10-CM: O41.00X0 ICD-9-CM: 658.00  3/23/2018 - Present Prenatal care of primigravida, antepartum ICD-10-CM: Z34.00 ICD-9-CM: V22.0  8/28/2017 - Present Overview Addendum 3/2/2018 11:44 AM by Racquel Ang  hemochromatosis: FOB GF: FOB plans to be tested, rec d/w pediatrician prn 
? NT: unable to calculate: plan TS  Pt will rtc for lab only Obesity: early glucola: #133, serial US at 32 wks q 4 wk FH Peruvian: hgb electro: AA 
Growth f/u w/ MFM Thursday 12/28/17 q4w w MFM for obesity Ant plac Prefer to avoid pitocin in labor Prefers natural labor/avoid epidural 
Avoid NO: mother has allergy Plan cord blood collection Breech at 33 wks Repeat C/S 3/30/18. Pt notified. Instructions sent. Attending Physician:  Arnoldo Zamarripa MD 
 
Delivery Type:  Vaginal Childbirth with Episiotomy, Laceration or Tear: What To Expect At [address] Your body will slowly heal in the next few weeks. It is easy to get too tired and overwhelmed during the first weeks after your baby is born. Changes in your hormones can shift your mood without warning. You may find it hard to meet the extra demands on your energy and time. Take it easy on yourself. Follow-up care is a key part of your treatment and safety. Be sure to make and go to all appointments, and call your doctor if you are having problems. It's also a good idea to know your test results and keep a list of the medicines you take. How can you care for yourself at home? Vaginal Bleeding and Cramps · After delivery, you will have a bloody discharge from the vagina. This will turn pink within a week and then white or yellow after about 10 days. It may last for 2 to 4 weeks or longer, until the uterus has healed. Use pads instead of tampons until you stop bleeding. · Do not worry if you pass some blood clots, as long as they are smaller than a golf ball. If you have a tear or stitches in your vaginal area, change the pad at least every 4 hours to prevent soreness and infection. · You may have cramps for the first few days after childbirth. These are normal and occur as the uterus shrinks to normal size. Take an over-the-counter pain medicine, such as acetaminophen (Tylenol), ibuprofen (Advil, Motrin), or naproxen (Aleve), for cramps. Read and follow all instructions on the label. Do not take aspirin, because it can cause more bleeding. Do not take acetaminophen (Tylenol) and other acetaminophen containing medications (i.e. Percocet) at the same time. Episiotomy, Lacerations or Tears · If you have stitches, they will dissolve on their own and do not need to be removed. · Put ice or a cold pack on your painful area for 10 to 20 minutes at a time, several times a day, for the first few days. Put a thin cloth between the ice and your skin. · Sit in a few inches of warm water (sitz bath) 3 times a day and after bowel movements. The warm water helps with pain and itching. If you do not have a tub, a warm shower might help. Breast fullness · Your breasts may overfill (engorge) in the first few days after delivery. To help milk flow and to relieve pain, warm your breasts in the shower or by using warm, moist towels before nursing. · If you are not nursing, do not put warmth on your breasts or touch your breasts. Wear a tight bra or sports bra and use ice until the fullness goes away. This usually takes 2 to 3 days.  
· Put ice or a cold pack on your breast after nursing to reduce swelling and pain. Put a thin cloth between the ice and your skin. Activity · Eat a balanced diet. Do not try to lose weight by cutting calories. Keep taking your prenatal vitamins, or take a multivitamin. · Get as much rest as you can. Try to take naps when your baby sleeps during the day. · Get some exercise every day. But do not do any heavy exercise until your doctor says it is okay. · Wait until you are healed (about 4 to 6 weeks) before you have sexual intercourse. Your doctor will tell you when it is okay to have sex. · Talk to your doctor about birth control. You can get pregnant even before your period returns. Also, you can get pregnant while you are breast-feeding. Mental Health · Many women get the \"baby blues\" during the first few days after childbirth. You may lose sleep, feel irritable, and cry easily. You may feel happy one minute and sad the next. Hormone changes are one cause of these emotional changes. Also, the demands of a new baby, along with visits from relatives or other family needs, add to a mother's stress. The \"baby blues\" often peak around the fourth day. Then they ease up in less than 2 weeks. · If your moodiness or anxiety lasts for more than 2 weeks, or if you feel like life is not worth living, you may have postpartum depression. This is different for each mother. Some mothers with serious depression may worry intensely about their infant's well-being. Others may feel distant from their child. Some mothers might even feel that they might harm their baby. A mother may have signs of paranoia, wondering if someone is watching her. · With all the changes in your life, you may not know if you are depressed. Pregnancy sometimes causes changes in how you feel that are similar to the symptoms of depression. · Symptoms of depression include: · Feeling sad or hopeless and losing interest in daily activities. These are the most common symptoms of depression. · Sleeping too much or not enough. · Feeling tired. You may feel as if you have no energy. · Eating too much or too little. · POSTPARTUM SUPPORT INTERNATIONAL (PSI) offers a Warm line; Chat with the Expert phone sessions; Information and Articles about Pregnancy and Postpartum Mood Disorders; Comprehensive List of Free Support Groups; Knowledgeable local coordinators who will offer support, information, and resources; Guide to Resources on [place]; Calendar of events in the  mood disorders community; Latest News and Research; and [facility] [address] for [employer]. Remember - You are not alone; You are not to blame; With help, you will be well. 2-539-483-PPD(3578). WWW. POSTPARTUM. NET · Writing or talking about death, such as writing suicide notes or talking about guns, knives, or pills. Keep the numbers for these national suicide hotlines: 7-429-443-TALK (2-369.538.6909) and 3-908-SVZLYKL (1-622.611.7399). If you or someone you know talks about suicide or feeling hopeless, get help right away. Constipation and Hemorrhoids Drink plenty of fluids, enough so that your urine is light yellow or clear like water. If you have kidney, heart, or liver disease and have to limit fluids, talk with your doctor before you increase the amount of fluids you drink. · Eat plenty of fiber each day. Have a bran muffin or bran cereal for breakfast, and try eating a piece of fruit for a mid-afternoon snack. · For painful, itchy hemorrhoids, put ice or a cold pack on the area several times a day for 10 minutes at a time. Follow this by putting a warm compress on the area for another 10 to 20 minutes or by sitting in a shallow, warm bath. When should you call for help? Call 911 anytime you think you may need emergency care. For example, call if: 
· You are thinking of hurting yourself, your baby, or anyone else. · You passed out (lost consciousness). · You have symptoms of a blood clot in your lung (called a pulmonary embolism). These may include: 
· Sudden chest pain. · Trouble breathing. · Coughing up blood. Call your doctor now or seek immediate medical care if: 
· You have severe vaginal bleeding. · You are soaking through a pad each hour for 2 or more hours. · Your vaginal bleeding seems to be getting heavier or is still bright red 4 days after delivery. · You are dizzy or lightheaded, or you feel like you may faint. · You are vomiting or cannot keep fluids down. · You have a fever. · You have new or more belly pain. · You pass tissue (not just blood). · Your vaginal discharge smells bad. · Your belly feels tender or full and hard. · Your breasts are continuously painful or red. · You feel sad, anxious, or hopeless for more than a few days. · You have sudden, severe pain in your belly. · You have symptoms of a blood clot in your leg (called a deep vein thrombosis), such as: 
· Pain in your calf, back of the knee, thigh, or groin. · Redness and swelling in your leg or groin. · You have symptoms of preeclampsia, such as: 
· Sudden swelling of your face, hands, or feet. · New vision problems (such as dimness or blurring). · A severe headache. · Your blood pressure is higher than it should be or rises suddenly. · You have new nausea or vomiting. Watch closely for changes in your health, and be sure to contact your doctor if you have any problems. Additional Information:  Postpartum Support PARENTS:  Are you feeling sad or depressed? Is it difficult for you to enjoy yourself? Do you feel more irritable or tense? Do you feel anxious or panicky? Are you having difficulty bonding with your baby? Do you feel as if you are \"out of control\" or \"going crazy\"? Are you worried that you might hurt your baby or yourself? FAMILIES: Do you worry that something is wrong but don't know how to help? Do you think that your partner or spouse is having problems coping? Are you worried that it may never get better? While many women experience some mild mood change or \"the blues\" during or after the birth of a child, 1 in 9 women experience more significant symptoms of depression or anxiety. 1 in 10 Dads become depressed during the first year. Things you can do Being a good parent includes taking care of yourself. If you take care of yourself, you will be able to take better care of your baby and your family. · Talk to a counselor or healthcare provider who has training in  mood and anxiety problems. · Learn as much as you can about pregnancy and postpartum depression and anxiety. · Get support from family and friends. Ask for help when you need it. · Join a support group in your area or online. · Keep active by walking, stretching or whatever form of exercise helps you to feel better. · Get enough rest and time for yourself. · Eat a healthy diet. · Don't give up! It may take more than one try to get the right help you need. These are general instructions for a healthy lifestyle: No smoking/ No tobacco products/ Avoid exposure to second hand smoke Surgeon General's Warning:  Quitting smoking now greatly reduces serious risk to your health. Obesity, smoking, and sedentary lifestyle greatly increases your risk for illness A healthy diet, regular physical exercise & weight monitoring are important for maintaining a healthy lifestyle Recognize signs and symptoms of STROKE: 
 
F-face looks uneven A-arms unable to move or move unevenly S-speech slurred or non-existent T-time-call 911 as soon as signs and symptoms begin - DO NOT go  
    back to bed or wait to see if you get better - TIME IS BRAIN. I have had the opportunity to make my options or choices for discharge. I have received and understand these instructions. Ivett Dumas disclaims any warranty or liability for your use of this information. Content Version: 8.8.62189; Last Revised: April 27, 2009 Thank you for choosing [address] We know you have many options when it comes to your healthcare; we appreciate that you picked us. Our goal is to provide exceptional service and world class care for every patient. You may receive a survey in the mail or by email asking for your feedback. Please take a few minutes to share your thoughts about your recent visit. Your comments helps us understand what we do well and what we can do better. To ensure confidentiality, this survey is administered by an independent third-party, [employer] [place] participation will help us to continue and improve the quality of care that we provide to you, your family, friends, and neighbors. Thank you! Introducing [facility]! Dear Tiff [facility]: Thank you for requesting a Enfora account. Our records indicate that you already have an active Enfora account. You can access your account anytime at [email or web address] Did you know that you can access your hospital and ER discharge instructions at any time in Enfora? You can also review all of your test results from your hospital stay or ER visit. Additional Information If you have questions, please visit the Frequently Asked Questions section of the Enfora website at [email or web address] Remember, Enfora is NOT to be used for urgent needs. For medical emergencies, dial 911. Now available from your iPhone and Android! Providers Seen During Your Hospitalization Provider Specialty Primary office phone Lola Coffey MD Obstetrics & Gynecology [phone] Your Primary Care Physician (PCP) Primary Care Physician Office Phone Office Fax UNKNOWN, PROVIDER ** None ** ** None ** You are allergic to the following Allergen Reactions Royce Anaphylaxis Recent Documentation Height Weight Breastfeeding? BMI OB Status Smoking Status 1.702 m 135.6 kg Unknown 46.83 kg/m2 Recent pregnancy Never Smoker Emergency Contacts Name Discharge Info Relation Home Work Mobile [facility] CAREGIVER [3] Spouse [3] [phone] [address],  Spouse [3] [phone] Patient Belongings The following personal items are in your possession at time of discharge: 
  Dental Appliances: None  Visual Aid: Glasses, With patient, At bedside      Home Medications: None   Jewelry: With patient  Clothing: At bedside    Other Valuables: At bedside, Cell Phone Please provide this summary of care documentation to your next provider. Signatures-by signing, you are acknowledging that this After Visit Summary has been reviewed with you and you have received a copy. Patient Signature:  ____________________________________________________________ Date:  ____________________________________________________________  
  
Jair Mcgill Provider Signature:  ____________________________________________________________ Date:  ____________________________________________________________

## 2018-03-23 NOTE — IP AVS SNAPSHOT
Summary of Care Report The Summary of Care report has been created to help improve care coordination. Users with access to [employer] or [address] (Web-based application) may access additional patient information including the Discharge Summary. If you are not currently a [address] user and need more information, please call the number listed below in the [place] [address] section and ask to be connected with Medical Records. Facility Information Name Address Phone [place] [id number] [phone] Patient Information Patient Name Sex  Wilbert Ken ([id number]) Female 1985 Discharge Information Admitting Provider Service Area Unit Marni Leo MD / Leeann Sal [facility] 3 Mother Infant / [phone] Discharge Provider Discharge Date/Time Discharge Disposition Destination (none) 3/25/2018 Midday (Pending) AHR (none) Patient Language Language ENGLISH [13] Hospital Problems as of 3/25/2018  Reviewed: 3/23/2018  1:41 PM by Marni Leo MD  
  
  
  
 Class Noted - Resolved Last Modified POA Active Problems Oligohydramnios  3/23/2018 - Present 3/23/2018 by Marni Leo MD Unknown Entered by Marni Leo MD  
  
Non-Hospital Problems as of 3/25/2018  Reviewed: 3/23/2018  1:41 PM by Marni Leo MD  
  
  
  
 Class Noted - Resolved Last Modified Active Problems Prenatal care of primigravida, antepartum  2017 - Present 3/2/2018 by Laila Johnson Entered by Efrain Haley MD  
  Overview Addendum 3/2/2018 11:44 AM by Laila Johnson Fh hemochromatosis: FOB GF: FOB plans to be tested, rec d/w pediatrician prn 
? NT: unable to calculate: plan TS  Pt will rtc for lab only Obesity: early glucola: #133, serial US at 32 wks q 4 wk FH Faroese: hgb electro: AA 
Growth f/u w/ MFM 17 q4w w MFM for obesity Ant plac Prefer to avoid pitocin in labor Prefers natural labor/avoid epidural 
Avoid NO: mother has allergy Plan cord blood collection Breech at 33 wks Repeat C/S 3/30/18. Pt notified. Instructions sent. You are allergic to the following Allergen Reactions Royce Anaphylaxis Current Discharge Medication List  
  
CONTINUE these medications which have NOT CHANGED Dose & Instructions Dispensing Information Comments PRENATAL 1 + IRON PO Take  by mouth. Refills:  0  
   
 ZANTAC 150 mg tablet Generic drug:  raNITIdine Dose:  150 mg Take 150 mg by mouth two (2) times a day. Refills:  0 STOP taking these medications Comments BENADRYL 25 mg capsule Generic drug:  diphenhydrAMINE ASK your doctor about these medications Dose & Instructions Dispensing Information Comments AMBULATORY BREAST PUMP Use as directed Quantity:  1 Device Refills:  0 Current Immunizations Name Date Influenza Vaccine (Quad) PF 10/10/2017 Tdap 2018 Surgery Information ID Date/Time Status Primary Surgeon All Procedures Location [id number] 3/23/2018 Complete   ZZANESTHESIA SFM - DO NOT SCHEDULE    
 [id number] 3/30/2018 Canceled Vicente Perez MD  SECTION [facility] L&D OR Follow-up Information Follow up With Details Comments Contact Info Provider Unknown   Patient not available to ask Discharge Instructions Medications:  
*Ibuprofen (over the counter) up to 600 mg every 6 hours as needed for pain or cramping *Continue Prenatal vitamins and DHA supplements while breastfeeding *You may use a stool softener if needed until comfortable with bowel movements, may take up to 100 mg of docusate sodium (Colace- over the counter) twice daily Appointments: *Follow-up with CNM in 2 to 6 weeks as directed Your Care Instructions Congratulations on the birth of your baby. Like pregnancy, the  period can be a time of excitement, vania, and exhaustion. You may look at your wondrous little baby and feel happy. You may also be overwhelmed by your new sleep hours and new responsibilities. At first, babies often sleep during the days and are awake at night. They do not have a pattern or routine. They may make sudden gasps, jerk themselves awake, or look like they have crossed eyes. These are all normal, and they may even make you smile. In these first weeks after delivery, try to take good care of yourself. It may take 4 to 6 weeks to feel like yourself again, and possibly longer if you had a  birth. You will likely feel very tired for several weeks. Your days will be full of ups and downs, but lots of vania as well. Follow-up care is a key part of your treatment and safety. Be sure to make and go to all appointments, and call your midwife if you are having problems. It's also a good idea to know your test results and keep a list of the medicines you take. How can you care for yourself at home? Take care of your body after delivery · Use pads instead of tampons for the bloody flow that may last as long as 2 weeks. · Ease cramps with ibuprofen (Advil). · Ease soreness of hemorrhoids and the area between your vagina and rectum with ice compresses or witch hazel pads. · Ease constipation by drinking lots of fluid and eating high-fiber foods. Ask your midwife about over-the-counter stool softeners. · Cleanse yourself with a gentle squeeze of warm water from a bottle instead of wiping with toilet paper. · Take a sitz bath in warm water several times a day. · Wear a good nursing bra. Ease sore and swollen breasts with warm, wet washcloths. · If you are not breast-feeding, use ice rather than heat for breast soreness. · Your period may not start for several months if you are breast-feeding. You may bleed more, and longer at first, than you did before you got pregnant. · Wait until you are healed (about 4 to 6 weeks) before you have sexual intercourse. · Try not to travel with your baby for 5 or 6 weeks. If you take a long car trip, make frequent stops to walk around and stretch. Avoid exhaustion · Rest every day. Try to nap when your baby naps. · Ask another adult to be with you for a few days after delivery. · Plan for  if you have other children. · Stay flexible so you can eat at odd hours and sleep when you need to. Both you and your baby are making new schedules. · Plan small trips to get out of the house. Change can make you feel less tired. · Ask for help with housework, cooking, and shopping. Remind yourself that your job is to care for your baby. Know about help for postpartum depression · \"Baby blues are common for the first 1 to 2 weeks after birth. You may cry or feel sad or irritable for no reason. · Rest whenever you can. Being tired makes it harder to handle your emotions. · Go for walks with your baby. · Talk to your partner, friends, and family about your feelings. · If your symptoms last for more than a few weeks, or if you feel very depressed, ask your doctor for help. · Postpartum depression can be treated. Support groups and counseling can help. Sometimes medicine can also help. Stay healthy · Eat healthy foods so you have more energy, make good breast milk, and lose extra baby pounds. · If you breast-feed, avoid alcohol and drugs. Stay smoke-free. If you quit during pregnancy, congratulations. · Start daily exercise after 4 to 6 weeks, but rest when you feel tired. · Learn exercises to tone your belly. Do Kegel exercises to regain strength in your pelvic muscles. You can do these exercises while you stand or sit. ¨ Squeeze the same muscles you would use to stop your urine. Your belly and rear end (buttocks) should not move. ¨ Hold the squeeze for 3 seconds, then relax for 3 seconds. ¨ Repeat the exercise 10 to 15 times for each session. Do three or more sessions each day. · Find a class for new mothers and new babies that has an exercise time. · If you had a  birth, give yourself a bit more time before you exercise, and be careful. When should you call for help? Call 911 anytime you think you may need emergency care. For example, call if: 
· You have sudden, severe pain in your belly. · You passed out (lost consciousness). Call your provider now or seek immediate medical care if: 
· You have severe vaginal bleeding. You are passing blood clots and soaking through a pad each hour for 2 or more hours. · Your vaginal bleeding seems to be getting heavier or is still bright red 4 days after delivery, or you pass blood clots larger than the size of a golf ball. · You are dizzy or lightheaded, or you feel like you may faint. · You are vomiting or cannot keep fluids down. · You have a fever. · You have new or more belly pain. · You pass tissue (not just blood). · Your breast or breasts have hard, red, or tender areas. · You have an urgent or frequent need to urinate, along with a burning feeling. · You have severe pain, tenderness, or swelling in your vagina or the area between your rectum and vagina. · You have severe pains in your chest, belly, back, or legs. · You have feelings of severe despair or great anxiety. · Your baby is unusually cranky or is sleeping too much. · Your baby's eyes are red or have discharge. · Your baby has white patches on the roof and sides of the mouth or tongue. · Your baby's umbilical cord is foul-smelling, swollen, red, or leaking pus. · There is blood or mucus in your baby's bowel movements. · Your baby has fewer than 6 wet diapers a day. · Your baby does not want to eat, or your baby is throwing up with every feeding. · Your baby has trouble breathing. · Your baby has a rectal temperature of 100.4°F or more, or an underarm temperature over 99.4°F. 
· You baby has a low temperature less than 97.5°F rectal, or less than 97. 0°F underarm. · Your baby's skin looks yellow. Watch closely for changes in your health, and be sure to contact your doctor if you have any problems. Where can you learn more? Go to [employer] Enter A461 in the search box to learn more about \"After Your Delivery (the Postpartum Period): After Your Visit. \"  
© 8850-6096 Healthwise, Incorporated. Care instructions adapted under license by [employer] (which disclaims liability or warranty for this information). This care instruction is for use with your licensed healthcare professional. If you have questions about a medical condition or this instruction, always ask your healthcare professional. Luciana Carne any warranty or liability for your use of this information. Content Version: 8.8.17542; Last Revised: July 8, 2010 Depression After Childbirth: After Your Birth Many women get the \"baby blues\" during the first few days after childbirth. They may lose sleep, feel irritable, cry easily, and feel happy one minute and sad the next. Hormone changes are one cause of these emotional changes. Also, the demands of a new baby, coupled with visits from relatives or other family needs, add to a mother's stress. The \"baby blues\" usually peak around the fourth day and then ease up in less than 2 weeks. If your moodiness or anxiety lasts for more than 2 weeks, or if you feel like life is not worth living, you may have postpartum depression. This is different for each mother.  Some mothers with serious depression may worry intensely about their infant's well-being, while others may feel distant from their child. Some mothers might even feel that they might harm their baby. A mother may have signs of paranoia, wondering if someone is watching her. Depression is not a sign of weakness. It is a medical condition that requires treatment. Medicine and counseling are often effective at reducing depression. Talk to your midwife about taking antidepressant medicine while breast-feeding. Follow-up care is a key part of your treatment and safety. Be sure to make and go to all appointments, and call your midwife if you are having problems. It's also a good idea to know your test results and keep a list of the medicines you take. How can you care for yourself at home? · Take your medicines exactly as prescribed. Call your provider if you think you are having a problem with your medicine. · Eat a balanced diet, so that you can keep up your energy. · Get regular daily exercise, such as walks, to help improve your mood. · Get as much sunlight as possible. Keep your shades and curtains open, and get outside as much as you can. · Avoid using alcohol or other substances to feel better. · Get as much rest and sleep as possible, and avoid doing too much. Being too tired can increase depression. · Play stimulating music throughout your day and soothing music at night. · Schedule outings and visits with friends and family. Ask them to call you regularly, so that you do not feel alone. · Ask for help with preparing food and other daily tasks. Family and friends are often happy to help a mother with a . · Be honest with yourself and those who care about you. Tell them about your struggle. · Join a support group of new mothers. No one can better understand the challenges of caring for a  than other new mothers. When should you call for help? Call 911 anytime you think you may need emergency care. For example, call if: 
· You feel you cannot stop from hurting yourself or someone else. Call your provider now or seek immediate medical care if: 
· You are having trouble caring for yourself or your baby. · You have signs of paranoia that can occur with postpartum depression. You fear that someone is watching you, stealing from you, or reading your mind. · You hear voices. · You have symptoms of postpartum depression, such as: ¨ Sleeplessness. ¨ Anxiety. ¨ Hopelessness. ¨ Irritability. ¨ Poor concentration. · Someone you know has depression and: 
¨ Starts to give away his or her possessions. ¨ Uses illegal drugs or drinks alcohol heavily. ¨ Talks or writes about death, including writing suicide notes and talking about guns, knives, or pills. ¨ Starts to spend a lot of time alone. ¨ Acts very aggressively or suddenly appears calm. Watch closely for changes in your health, and be sure to contact your doctor if you have any problems. Where can you learn more? Go to [employer].be Enter P138 in the search box to learn more about \"Depression After Childbirth: After Your Visit. \"  
© 8544-8694 Healthwise, Incorporated. Care instructions adapted under license by [place] (which disclaims liability or warranty for this information). This care instruction is for use with your licensed healthcare professional. If you have questions about a medical condition or this instruction, always ask your healthcare p POSTPARTUM DISCHARGE INSTRUCTIONS Name:  Carol Cavazos YOB: 1985 Admission Diagnosis:  Pregnancy Breech Oligohydramnios Discharge Diagnosis:   
Problem List as of 3/25/2018  Date Reviewed: 3/23/2018 Codes Class Noted - Resolved Oligohydramnios ICD-10-CM: O41.00X0 ICD-9-CM: 658.00  3/23/2018 - Present Prenatal care of primigravida, antepartum ICD-10-CM: Z34.00 ICD-9-CM: V22.0  8/28/2017 - Present Overview Addendum 3/2/2018 11:44 AM by Sebastien Pollard Fh hemochromatosis: FOB GF: FOB plans to be tested, rec d/w pediatrician prn 
? NT: unable to calculate: plan TS  Pt will rtc for lab only Obesity: early glucola: #133, serial US at 32 wks q 4 wk FH [place]ian: hgb electro: AA 
Growth f/u w/ MFM Thursday 12/28/17 q4w w MFM for obesity Ant plac Prefer to avoid pitocin in labor Prefers natural labor/avoid epidural 
Avoid NO: mother has allergy Plan cord blood collection Breech at 33 wks Repeat C/S 3/30/18. Pt notified. Instructions sent. Attending Physician:  Fatimah Palma MD 
 
Delivery Type:  Vaginal Childbirth with Episiotomy, Laceration or Tear: What To Expect At [address] Your body will slowly heal in the next few weeks. It is easy to get too tired and overwhelmed during the first weeks after your baby is born. Changes in your hormones can shift your mood without warning. You may find it hard to meet the extra demands on your energy and time. Take it easy on yourself. Follow-up care is a key part of your treatment and safety. Be sure to make and go to all appointments, and call your doctor if you are having problems. It's also a good idea to know your test results and keep a list of the medicines you take. How can you care for yourself at home? Vaginal Bleeding and Cramps · After delivery, you will have a bloody discharge from the vagina. This will turn pink within a week and then white or yellow after about 10 days. It may last for 2 to 4 weeks or longer, until the uterus has healed. Use pads instead of tampons until you stop bleeding. · Do not worry if you pass some blood clots, as long as they are smaller than a golf ball. If you have a tear or stitches in your vaginal area, change the pad at least every 4 hours to prevent soreness and infection. · You may have cramps for the first few days after childbirth. These are normal and occur as the uterus shrinks to normal size.  Take an over-the-counter pain medicine, such as acetaminophen (Tylenol), ibuprofen (Advil, Motrin), or naproxen (Aleve), for cramps. Read and follow all instructions on the label. Do not take aspirin, because it can cause more bleeding. Do not take acetaminophen (Tylenol) and other acetaminophen containing medications (i.e. Percocet) at the same time. Episiotomy, Lacerations or Tears · If you have stitches, they will dissolve on their own and do not need to be removed. · Put ice or a cold pack on your painful area for 10 to 20 minutes at a time, several times a day, for the first few days. Put a thin cloth between the ice and your skin. · Sit in a few inches of warm water (sitz bath) 3 times a day and after bowel movements. The warm water helps with pain and itching. If you do not have a tub, a warm shower might help. Breast fullness · Your breasts may overfill (engorge) in the first few days after delivery. To help milk flow and to relieve pain, warm your breasts in the shower or by using warm, moist towels before nursing. · If you are not nursing, do not put warmth on your breasts or touch your breasts. Wear a tight bra or sports bra and use ice until the fullness goes away. This usually takes 2 to 3 days. · Put ice or a cold pack on your breast after nursing to reduce swelling and pain. Put a thin cloth between the ice and your skin. Activity · Eat a balanced diet. Do not try to lose weight by cutting calories. Keep taking your prenatal vitamins, or take a multivitamin. · Get as much rest as you can. Try to take naps when your baby sleeps during the day. · Get some exercise every day. But do not do any heavy exercise until your doctor says it is okay. · Wait until you are healed (about 4 to 6 weeks) before you have sexual intercourse. Your doctor will tell you when it is okay to have sex. · Talk to your doctor about birth control.  You can get pregnant even before your period returns. Also, you can get pregnant while you are breast-feeding. Mental Health · Many women get the \"baby blues\" during the first few days after childbirth. You may lose sleep, feel irritable, and cry easily. You may feel happy one minute and sad the next. Hormone changes are one cause of these emotional changes. Also, the demands of a new baby, along with visits from relatives or other family needs, add to a mother's stress. The \"baby blues\" often peak around the fourth day. Then they ease up in less than 2 weeks. · If your moodiness or anxiety lasts for more than 2 weeks, or if you feel like life is not worth living, you may have postpartum depression. This is different for each mother. Some mothers with serious depression may worry intensely about their infant's well-being. Others may feel distant from their child. Some mothers might even feel that they might harm their baby. A mother may have signs of paranoia, wondering if someone is watching her. · With all the changes in your life, you may not know if you are depressed. Pregnancy sometimes causes changes in how you feel that are similar to the symptoms of depression. · Symptoms of depression include: · Feeling sad or hopeless and losing interest in daily activities. These are the most common symptoms of depression. · Sleeping too much or not enough. · Feeling tired. You may feel as if you have no energy. · Eating too much or too little. · [employer] (PSI) offers a Warm line; Chat with the Expert phone sessions; Information and Articles about Pregnancy and Postpartum Mood Disorders; Comprehensive List of Free Support Groups; Knowledgeable local coordinators who will offer support, information, and resources; Guide to Resources on Offerial; Calendar of events in the  mood disorders community;  Latest News and Research; and [facility] Section for Access and Networking. Remember - You are not alone; You are not to blame; With help, you will be well. 7-690-031-PPD(5759). WWW. POSTPARTUM. NET · Writing or talking about death, such as writing suicide notes or talking about guns, knives, or pills. Keep the numbers for these national suicide hotlines: 0-071-135-TALK (8-449.158.3440) and 8-336-ARIIOZI (1-175.319.7534). If you or someone you know talks about suicide or feeling hopeless, get help right away. Constipation and Hemorrhoids Drink plenty of fluids, enough so that your urine is light yellow or clear like water. If you have kidney, heart, or liver disease and have to limit fluids, talk with your doctor before you increase the amount of fluids you drink. · Eat plenty of fiber each day. Have a bran muffin or bran cereal for breakfast, and try eating a piece of fruit for a mid-afternoon snack. · For painful, itchy hemorrhoids, put ice or a cold pack on the area several times a day for 10 minutes at a time. Follow this by putting a warm compress on the area for another 10 to 20 minutes or by sitting in a shallow, warm bath. When should you call for help? Call 911 anytime you think you may need emergency care. For example, call if: 
· You are thinking of hurting yourself, your baby, or anyone else. · You passed out (lost consciousness). · You have symptoms of a blood clot in your lung (called a pulmonary embolism). These may include: 
· Sudden chest pain. · Trouble breathing. · Coughing up blood. Call your doctor now or seek immediate medical care if: 
· You have severe vaginal bleeding. · You are soaking through a pad each hour for 2 or more hours. · Your vaginal bleeding seems to be getting heavier or is still bright red 4 days after delivery. · You are dizzy or lightheaded, or you feel like you may faint. · You are vomiting or cannot keep fluids down. · You have a fever. · You have new or more belly pain. · You pass tissue (not just blood). · Your vaginal discharge smells bad. · Your belly feels tender or full and hard. · Your breasts are continuously painful or red. · You feel sad, anxious, or hopeless for more than a few days. · You have sudden, severe pain in your belly. · You have symptoms of a blood clot in your leg (called a deep vein thrombosis), such as: 
· Pain in your calf, back of the knee, thigh, or groin. · Redness and swelling in your leg or groin. · You have symptoms of preeclampsia, such as: 
· Sudden swelling of your face, hands, or feet. · New vision problems (such as dimness or blurring). · A severe headache. · Your blood pressure is higher than it should be or rises suddenly. · You have new nausea or vomiting. Watch closely for changes in your health, and be sure to contact your doctor if you have any problems. Additional Information:  Postpartum Support PARENTS:  Are you feeling sad or depressed? Is it difficult for you to enjoy yourself? Do you feel more irritable or tense? Do you feel anxious or panicky? Are you having difficulty bonding with your baby? Do you feel as if you are \"out of control\" or \"going crazy\"? Are you worried that you might hurt your baby or yourself? FAMILIES: Do you worry that something is wrong but don't know how to help? Do you think that your partner or spouse is having problems coping? Are you worried that it may never get better? While many women experience some mild mood change or \"the blues\" during or after the birth of a child, 1 in 9 women experience more significant symptoms of depression or anxiety. 1 in 10 Dads become depressed during the first year. Things you can do Being a good parent includes taking care of yourself. If you take care of yourself, you will be able to take better care of your baby and your family. · Talk to a counselor or healthcare provider who has training in  mood and anxiety problems. · Learn as much as you can about pregnancy and postpartum depression and anxiety. · Get support from family and friends. Ask for help when you need it. · Join a support group in your area or online. · Keep active by walking, stretching or whatever form of exercise helps you to feel better. · Get enough rest and time for yourself. · Eat a healthy diet. · Don't give up! It may take more than one try to get the right help you need. These are general instructions for a healthy lifestyle: No smoking/ No tobacco products/ Avoid exposure to second hand smoke Surgeon General's Warning:  Quitting smoking now greatly reduces serious risk to your health. Obesity, smoking, and sedentary lifestyle greatly increases your risk for illness A healthy diet, regular physical exercise & weight monitoring are important for maintaining a healthy lifestyle Recognize signs and symptoms of STROKE: 
 
F-face looks uneven A-arms unable to move or move unevenly S-speech slurred or non-existent T-time-call 911 as soon as signs and symptoms begin - DO NOT go  
    back to bed or wait to see if you get better - TIME IS BRAIN. I have had the opportunity to make my options or choices for discharge. I have received and understand these instructions. rofessional. [facility] disclaims any warranty or liability for your use of this information. Content Version: 8.8.68008; Last Revised: April 27, 2009 Thank you for choosing [facility]. We know you have many options when it comes to your healthcare; we appreciate that you picked us. Our goal is to provide exceptional service and world class care for every patient. You may receive a survey in the mail or by email asking for your feedback. Please take a few minutes to share your thoughts about your recent visit. Your comments helps us understand what we do well and what we can do better. To ensure confidentiality, this survey is administered by an independent third-party, [address] [place] [address] participation will help us to continue and improve the quality of care that we provide to you, your family, friends, and neighbors. Thank you! Chart Review Routing History Recipient Method Report Sent By José Miguel Parekh MD  
Phone: [phone] In [address] [place], [place] [address]StockdaleEugenia Child MD [[id number]] 10/20/2017  1:34 PM   
 Mendoza Parekh MD  
Phone: [phone]  In [address] [place], [place] [address]StockdaleEugenia Child MD [[id number]] 11/30/2017  9:32 AM

## 2018-03-23 NOTE — PROGRESS NOTES
ROX[facility] Labor Progress Note     Patient: Tato Cox MRN: [id number]  SSN: [id number]    YOB: 1985  Age: 29 y.o. Sex: female        Subjective:   Patient coping well with contractions, better after epidural replaced. Zofran ordered for nausea. Objective:   Blood pressure 149/64, pulse (!) 110, temperature 97.8 °F (36.6 °C), resp. rate 16, height 5' 7\" (1.702 m), weight 299 lb (135.6 kg), last menstrual period 2017, SpO2 99 %. Labile BPs with epidural.   Fetal heart baseline 120, moderate variability,  Periods of marked variability resolved. Positive accelerations, negative decelerations, Uterine contractions q 3-4 minutes, moderate to palpation, resting tone soft, Sterile Vaginal Exam:6 cm dilated/  80% effaced/ -2 station, cervix midline, fetal presentation vertex, ruptured for clear fluid. Assessment:     38w0d  Category 1 fetal heart rate tracing   IOL for oligo with SGA fetus.        Plan:   Continue current orders/management   CNM management   Anticipate     Gisselle Mojica CNM

## 2018-03-23 NOTE — PROGRESS NOTES
1020 Patient arrived to labor and delivery after being seen in office today for routine prenatal visit. On US, patient was found to have a low BIBI and was recommended by MFM for induction of labor for oligohydramnios. Sanders balloon was placed in office and patient to be started on pitocin. 1039 Patient placed on EFM at this time. 1045 Patient consented for admission and induction of labor. All questions answered. 1119 Patient crying in pain from sanders balloon. Discussed pain control options with patient. Patient states she does not want an epidural and plans to go natural. RN had a discussion with patient to discuss reasonable pain control options since sanders balloon is causing her so much pain. Patient would like some pain medication at this time. [id number] Paged Dr. Latoya Valerio to request pain medications. 1153 Stadol given. 1200 Phenergan given. [address] Dr. Latoya Valerio at the bedside for SVE. Sanders balloon removed and AROM performed. 1243 Fetal scalp electrode placed by Dr. Latoya Valerio. 1249 Intrauterine pressure catheter placed by Dr. Latoya Valerio. MVU's to be 200 per MD.     1340 Patient assisted to bedpan. 1444 Patient states she is tolerating contractions well and is doing fine. No complaints. 5 Notified Dr. Brant Hogan of patient request for epidural.    [phone] Dr. Brant Hogan at the bedside for epidural placement. [id number] Patient stating she feels lightheaded, dizzy, nauseated, and is having a hard time hearing and has spots in her vision. BP low after epidural. Ephedrine given and LR bolus restarted. Laid patient in left lateral position with bed in trendelenberg. Stopped epidural infusion. 8715 Patient remains feeling very symptomatic with low blood pressures. States ringing came back in her ears and everything seems \"distant\". Requested additional dose of ephedrine from Dr. Brant Hogan for decreased blood pressure. Patient did not respond to first dose and is very symptomatic.  MD ordered additional 10 mg now and 10 mg in 5-10 minutes if needed. 1646 Second dose of ephedrine given. 1658 Third dose of ephedrine given. 1700 States she feels better. 1705 Patient stating she is beginning to feel very symptomatic again. Requested Dr. Krystle Lovelace at bedside for evaluation. 3[facility] [place], CRNA at bedside. CRNA gave dorian IV. Dermatomes as high as armpit on left side and below knee on right side. Patient repositioned all the way on right side. [phone] states to attempt to restart epidural in 25-30 minutes. 1530 [facility], CRNA at bedside to reassess epidural dermatomes. No change. Will replace epidural.     1750 Patient emptied bladder on bedpan. Chyna Wiley 61 Dr. Krystle Lovelace in to replace epidural.     1810 Requested Dr. Krystle Lovelace back at the bedside to reassess patient. Patient feeling symptomatic again with dizziness and nausea and drop in BP and HR between 130-145 BPM. Restarted LR bolus. [address] Dr. Krystle Lovelace returned to the bedside to assess patient. MD states to turn patient on right side since left side is still high on her dermatome level and to wait until BP stabilizes before restarting fentanyl. MD would like to restart continuous fentanyl infusion at 6.     1910 Bedside and Verbal shift change report given to MARK Marc (oncoming nurse) by Berny Palacios RN (offgoing nurse). Report included the following information SBAR, Kardex, Intake/Output, MAR, Accordion, Recent Results and Med Rec Status.

## 2018-03-23 NOTE — ANESTHESIA PROCEDURE NOTES
Epidural Block    Start time: 3/23/2018 6:00 PM  End time: 3/23/2018 6:07 PM  Performed by: Angela Sandy by: Susan Gimenez     Pre-Procedure  Indication: labor epidural    Preanesthetic Checklist: patient identified, risks and benefits discussed, anesthesia consent, timeout performed and anesthesia consent      Epidural:   Patient position:  Seated  Prep region:  Lumbar  Prep: Betadine    Location:  L3-4    Needle and Epidural Catheter:   Needle Type:  Tuohy  Needle Gauge:  17 G  Injection Technique:  Loss of resistance using air  Attempts:  1  Catheter Size:  18 G  Events: no paresthesia and negative aspiration test    Test Dose:  Lidocaine 1.5% w/ epi and negative    Assessment:   Catheter Secured:  Tegaderm and tape  Insertion:  Uncomplicated  Patient tolerance:  Patient tolerated the procedure well with no immediate complications

## 2018-03-23 NOTE — PROGRESS NOTES
SBAR received from Dr. Sindi Pavon and care of patient assumed. Cervical exam done by Dr. Sindi Pavon and patient now 6/700 station, change from earlier exam. Remains comfortable with epidural. BP improved s/p ephedrine for hypotension. FHR with variables with occasional late decelerattions. Moderate variablilty. Contractions q 2-3 minutes MVU >200. Pitocin at 8 mu/min. A/P;Progressing in to active labor   Continue close observation of fetal status  Empty bladder   Recheck in 30 minutes if no change in fetal status.

## 2018-03-23 NOTE — ANESTHESIA PROCEDURE NOTES
Epidural Block    Start time: 3/23/2018 4:00 PM  End time: 3/23/2018 4:10 PM  Performed by: Jose Heaton by: Christofer Nayak     Pre-Procedure  Indication: labor epidural    Preanesthetic Checklist: patient identified, risks and benefits discussed, anesthesia consent, timeout performed and anesthesia consent      Epidural:   Patient position:  Seated  Prep region:  Lumbar  Prep: Betadine    Location:  L3-4    Needle and Epidural Catheter:   Needle Type:  Tuohy  Needle Gauge:  17 G  Injection Technique:  Loss of resistance using air  Attempts:  1  Catheter Size:  18 G  Events: no paresthesia and negative aspiration test    Test Dose:  Lidocaine 1.5% w/ epi and negative    Assessment:   Catheter Secured:  Tegaderm and tape  Insertion:  Uncomplicated  Patient tolerance:  Patient tolerated the procedure well with no immediate complications

## 2018-03-23 NOTE — MR AVS SNAPSHOT
[address] [facility] [address] [place] [address] 
[phone] Patient: Inés Renee MRN: [id number] Visit Information Date & Time Provider Department Dept. Phone Encounter #  
 3/23/2018  9:30 AM [place] Camera, Vic Crisostomo [phone] [id number] Your Appointments 3/30/2018  9:30 AM  
PROCEDURE with MD Brandon Camargo [facility] Appt Note: Repeat C/S  
 [address] [place] [address]  
[phone]  
  
   
 [address] [address]  
  
    
  
 3/30/2018  8:40 AM  
OB VISIT with MD Brandon Camargo ([facility]) Appt Note: 1wk fob TP  
 [address] [place] [address]  
[phone]  
  
   
 [address] [place] [address]  
  
    
 4/6/2018  9:30 AM  
OB VISIT with MD Brandon Bradford ([facility]) Appt Note: 1wk fob TP pt - rotate [address] [facility] [address]  
[facility] [address] [place] [address] Upcoming Health Maintenance Date Due  
 PAP AKA CERVICAL CYTOLOGY 8/28/2020 Allergies as of 3/23/2018  Review Complete On: 3/23/2018 By: Deborah Sagastume Severity Noted Reaction Type Reactions Royce High 08/28/2017    Anaphylaxis Current Immunizations  Never Reviewed Name Date Influenza Vaccine (Quad) PF 10/10/2017 Tdap 2/1/2018 Not reviewed this visit Vitals BP Weight(growth percentile) LMP BMI OB Status Smoking Status 148/90 299 lb (135.6 kg) 06/30/2017 (Exact Date) 46.83 kg/m2 Pregnant Never Smoker BMI and BSA Data Body Mass Index Body Surface Area  
 46.83 kg/m 2 2.53 m 2 Preferred Pharmacy Pharmacy Name Phone [id number]- Reina Chet, [address] [phone] Your Updated Medication List  
  
   
This list is accurate as of 3/23/18 10:02 AM.  Always use your most recent med list. AMBULATORY BREAST PUMP Use as directed BENADRYL 25 mg capsule Generic drug:  diphenhydrAMINE Take 25 mg by mouth every six (6) hours as needed. PRENATAL 1 + IRON PO Take  by mouth. ZANTAC 150 mg tablet Generic drug:  raNITIdine Take 150 mg by mouth two (2) times a day. Introducing [facility] SERVICES! Dear Elton Quivers: Thank you for requesting a Yolto account. Our records indicate that you already have an active Yolto account. You can access your account anytime at [email or web address]. [email or web address] Did you know that you can access your hospital and ER discharge instructions at any time in Yolto? You can also review all of your test results from your hospital stay or ER visit. Additional Information If you have questions, please visit the Frequently Asked Questions section of the Yolto website at [email or web address] Remember, Yolto is NOT to be used for urgent needs. For medical emergencies, dial 911. Now available from your iPhone and Android! Please provide this summary of care documentation to your next provider. Your primary care clinician is listed as PROVIDER UNKNOWN. If you have any questions after today's visit, please call [phone].

## 2018-03-23 NOTE — H&P
History & Physical- late entry    Name: Jaime Garces MRN: [id number]  SSN: [id number]    YOB: 1985  Age: 29 y.o. Sex: female        Subjective:     Estimated Date of Delivery: 18  OB History      Para Term  AB Living    1         SAB TAB Ectopic Molar Multiple Live Births                   Ms. Mirna Holt is admitted with pregnancy at 38w0d for induction of labor. Prenatal course was complicated by obesity and oligohydramnios. Please see prenatal records for details. Pt was admitted from the office with a sanders, the sanders was easily removed during exam.      Past Medical History:   Diagnosis Date    Pap smear for cervical cancer screening 2017    Negative, HPV negative    Routine Papanicolaou smear     Negative per pt. Past Surgical History:   Procedure Laterality Date    HX WISDOM TEETH EXTRACTION  2017     Social History     Occupational History    Not on file. Social History Main Topics    Smoking status: Never Smoker    Smokeless tobacco: Never Used      Comment: Never used vapor or e-cigs     Alcohol use No    Drug use: No    Sexual activity: Yes     Partners: Male     Birth control/ protection: None     Family History   Problem Relation Age of Onset    Colon Cancer Maternal Grandfather     Diabetes Paternal Grandmother        Allergies   Allergen Reactions    Colton Anaphylaxis     Prior to Admission medications    Medication Sig Start Date End Date Taking? Authorizing Provider   diphenhydrAMINE (BENADRYL) 25 mg capsule Take 25 mg by mouth every six (6) hours as needed. Yes Historical Provider   raNITIdine (ZANTAC) 150 mg tablet Take 150 mg by mouth two (2) times a day. Yes Historical Provider   PRENATAL VITS W-CA,FE,FA,1 MG, (PRENATAL 1 + IRON PO) Take  by mouth.    Yes Historical Provider   AMBULATORY BREAST PUMP Use as directed 2/15/18   Efrain Haley MD        Review of Systems: A comprehensive review of systems was negative except for that written in the HPI. Objective:     Vitals:  Vitals:    03/23/18 1034 03/23/18 1053   BP:  110/71   Pulse:  100   Resp:  16   Temp:  98.6 °F (37 °C)   Weight: 299 lb (135.6 kg)    Height: 5' 7\" (1.702 m)         Physical Exam:  Patient without distress. Membranes:  Artificial Rupture of Membranes; Amniotic Fluid: small amount of clear fluid  Fetal Heart Rate: baseline 110's with moderate variability and accelerations. Prenatal Labs:   Lab Results   Component Value Date/Time    Rubella, External immune 09/06/2017    GrBStrep, External Negative 03/02/2018    HBsAg, External negative 09/06/2017    HIV, External non reactive 09/06/2017    Gonorrhea, External negative 09/06/2017    Chlamydia, External negative 09/06/2017        Assessment/Plan:     Plan: Admit for IOL for oligohydramnios at 38 weeks. Group B Strep was negative. IUPC placed and FSE to better monitor better and uterine contractions. Epid prn. Continue active management of labor.     Signed By:  Marni Leo MD     March 23, 2018

## 2018-03-23 NOTE — PROGRESS NOTES
1951Pt c/o nausea. [address] [facility] midwife notified. Verbal order for zofran. 2015 Dr Kaya Perry notified epidural started @ 6, per dr Allie Zhu order. Pt feeling pressure. MD verbalizes understanding. Order to set epidural at 10 now. 2048 CYRUS Estevez notified pt voided 300cc on bedpan. Went on R side(visualize strip) & now on L side w/peanut ball between legs. Midwife verbalizes understanding.

## 2018-03-23 NOTE — PROGRESS NOTES
Pt sent form [address] [place] [address] for IOL due to oligohydramnios at 38 weeks. Cervix 30/2-3/-3/v. Intracervical sanders placed and insufflated with 30cc of sterile water.   Pt sent to L&D for IOL>

## 2018-03-24 PROCEDURE — 77030021125

## 2018-03-24 PROCEDURE — 74011250637 HC RX REV CODE- 250/637: Performed by: ADVANCED PRACTICE MIDWIFE

## 2018-03-24 PROCEDURE — 65270000029 HC RM PRIVATE

## 2018-03-24 PROCEDURE — 74011250637 HC RX REV CODE- 250/637: Performed by: OBSTETRICS & GYNECOLOGY

## 2018-03-24 RX ORDER — NALOXONE HYDROCHLORIDE 0.4 MG/ML
0.4 INJECTION, SOLUTION INTRAMUSCULAR; INTRAVENOUS; SUBCUTANEOUS AS NEEDED
Status: DISCONTINUED | OUTPATIENT
Start: 2018-03-24 | End: 2018-03-25 | Stop reason: HOSPADM

## 2018-03-24 RX ORDER — ADHESIVE BANDAGE
30 BANDAGE TOPICAL DAILY PRN
Status: DISCONTINUED | OUTPATIENT
Start: 2018-03-24 | End: 2018-03-25 | Stop reason: HOSPADM

## 2018-03-24 RX ORDER — IBUPROFEN 800 MG/1
800 TABLET ORAL EVERY 8 HOURS
Status: DISCONTINUED | OUTPATIENT
Start: 2018-03-24 | End: 2018-03-25 | Stop reason: HOSPADM

## 2018-03-24 RX ORDER — DOCUSATE SODIUM 100 MG/1
100 CAPSULE, LIQUID FILLED ORAL
Status: DISCONTINUED | OUTPATIENT
Start: 2018-03-24 | End: 2018-03-25 | Stop reason: HOSPADM

## 2018-03-24 RX ORDER — SWAB
1 SWAB, NON-MEDICATED MISCELLANEOUS DAILY
Status: DISCONTINUED | OUTPATIENT
Start: 2018-03-24 | End: 2018-03-25 | Stop reason: HOSPADM

## 2018-03-24 RX ORDER — LANOLIN ALCOHOL/MO/W.PET/CERES
1 CREAM (GRAM) TOPICAL
Status: DISCONTINUED | OUTPATIENT
Start: 2018-03-24 | End: 2018-03-25 | Stop reason: HOSPADM

## 2018-03-24 RX ORDER — HYDROCORTISONE ACETATE PRAMOXINE HCL 2.5; 1 G/100G; G/100G
CREAM TOPICAL AS NEEDED
Status: DISCONTINUED | OUTPATIENT
Start: 2018-03-24 | End: 2018-03-25 | Stop reason: HOSPADM

## 2018-03-24 RX ORDER — ACETAMINOPHEN 325 MG/1
650 TABLET ORAL
Status: DISCONTINUED | OUTPATIENT
Start: 2018-03-24 | End: 2018-03-25 | Stop reason: HOSPADM

## 2018-03-24 RX ORDER — OXYTOCIN/RINGER'S LACTATE 20/1000 ML
125-500 PLASTIC BAG, INJECTION (ML) INTRAVENOUS ONCE
Status: ACTIVE | OUTPATIENT
Start: 2018-03-24 | End: 2018-03-24

## 2018-03-24 RX ORDER — HYDROCODONE BITARTRATE AND ACETAMINOPHEN 5; 325 MG/1; MG/1
1 TABLET ORAL
Status: DISCONTINUED | OUTPATIENT
Start: 2018-03-24 | End: 2018-03-25 | Stop reason: HOSPADM

## 2018-03-24 RX ADMIN — IBUPROFEN 800 MG: 800 TABLET ORAL at 18:37

## 2018-03-24 RX ADMIN — IBUPROFEN 800 MG: 800 TABLET ORAL at 10:06

## 2018-03-24 RX ADMIN — DOCUSATE SODIUM 100 MG: 100 CAPSULE, LIQUID FILLED ORAL at 18:37

## 2018-03-24 RX ADMIN — IBUPROFEN 800 MG: 800 TABLET ORAL at 01:54

## 2018-03-24 RX ADMIN — DOCUSATE SODIUM 100 MG: 100 CAPSULE, LIQUID FILLED ORAL at 08:35

## 2018-03-24 NOTE — ROUTINE PROCESS
Bedside and Verbal shift change report given to MARK Hudson RN (oncoming nurse) by Marlin De La Rosa RN (offgoing nurse). Report included the following information SBAR, Kardex, Intake/Output, MAR and Recent Results.

## 2018-03-24 NOTE — L&D DELIVERY NOTE
CNM Delivery Note       Patient: Emely Rajput MRN: [id number]  SSN: [id number]    YOB: 1985  Age: 29 y.o. Sex: female        Complete cervical dilation at 2304.  of a live baby girl, Genesis Bradshaw, at 2319 with Apgars 9,9 in ANN MARIE position under epidural anesthesia with mother in [address] position. Shoulders spontaneous, easily delivered with maternal effort. Vigorous infant placed on maternal abdomen immediately following delivery. Cord clamped x 2 and cut by FOB after pulsation stopped. Cord blood collected for cord blood and for CBR per family request. Placenta and membranes spontaneous, intact, with 3 vessel cord via Wing Abide mechanism at 21 . Fundus massaged to firm. Estimated blood loss 200 mL. Vagina and perineum inspected. 1st degree laceration repaired with 3-0 vicryl rapide under epidural and local anesthesia. Mother and infant stable, bonding, establishing breastfeeding. Delivery Summary    Patient: Emely Rajput MRN: [id number]  SSN: [id number]    YOB: 1985  Age: 29 y.o. Sex: female       Information for the patient's :  Tram Ordoñez, Female [[id number]]       Labor Events:    Labor: No   Rupture Date: 3/23/2018   Rupture Time: 12:42 PM   Rupture Type AROM   Amniotic Fluid Volume: Moderate    Amniotic Fluid Description: Clear None   Induction: AROM; Cash Bulb (balloon); Oxytocin       Augmentation: None   Labor Events: None     Cervical Ripening:      Cash/EASI     Delivery Events:  Episiotomy: None   Laceration(s): First degree perineal     Repaired: Yes    Number of Repair Packets: 1   Suture Type and Size: Chromic 3-0     Estimated Blood Loss (ml): 200ml       Delivery Date: 3/23/2018    Delivery Time: 11:19 PM  Delivery Type: Vaginal, Spontaneous Delivery  Sex:  Female     Gestational Age: 38w0d   Delivery Clinician:  Marie Estevez  Living Status: Living   Delivery Location: L&D            APGARS  One minute Five minutes Ten minutes   Skin color: 1   1 Heart rate: 2   2        Grimace: 2   2        Muscle tone: 2   2        Breathin   2        Totals: 9   9            Presentation: Vertex    Position: Left Occiput Anterior  Resuscitation Method:  Suctioning-bulb; Tactile Stimulation     Meconium Stained: None      Cord Vessels: 3 Vessels      Cord Events:    Cord Blood Sent?:  Yes    Blood Gases Sent?:  No    Placenta:  Date/Time: 3/23 11:26 PM  Removal: Spontaneous      Appearance: Normal     [place] Measurements:  Birth Weight:        Birth Length:        Head Circumference:        Chest Circumference:       Abdominal Girth: Other Providers:   SHALOM JHA;ANDI LACY;;SHIMON MENDEZ;DANE OAKLEY, Primary Nurse;Primary  Nurse;Nurse Practitioner;Midwife; Charge Nurse           Group B Strep:   Lab Results   Component Value Date/Time    Yuval, External Negative 2018     Information for the patient's :  Colt Jasmine, Female [[id number]]   No results found for: ABORH, PCTABR, PCTDIG, BILI, ABORHEXT, ABORH    No results found for: APH, APCO2, APO2, AHCO3, ABEC, ABDC, O2ST, EPHV, PCO2V, PO2V, HCO3V, EBEV, EBDV, SITE, RSCOM

## 2018-03-24 NOTE — LACTATION NOTE
Marta Friedman Lactation Consultant Signed  Progress Notes Date of Service: 03/24/18 1211         Problem: Lactation Care Plan  Goal: *Infant latching appropriately  Outcome: Progressing Towards Goal  Pt will successfully establish breastfeeding by feeding in response to infant's early feeding cues and/or to offer breast every 2-3 hours. Ways to obtain a deep latch and seek comfortable positioning shared, aware to keep log of feedings/output. Goal: *Weight loss less than 10% of birth weight  Outcome: Progressing Towards Goal     Encouraged mom to attempt feeding with baby led feeding cues. Just as sucking on fingers, rooting, mouthing. Looking for 8-12 feedings in 24 hours. Don't limit baby at breast, allow baby to come of breast on it's own. Baby may want to feed  often and may increase number of feedings on second day of life. Skin to skin encouraged.       If baby doesn't nurse,  Mom should  hand express  10-20 drops of colostrum and drip into baby's mouth, or give to baby by finger feeding, cup feeding, or spoon feeding at least every 2-3 hours.      Problem: Patient Education: Go to Patient Education Activity  Goal: Patient/Family Education  Outcome: Progressing Towards Goal  Discussed with mother her plan for feeding. Reviewed the benefits of exclusive breast milk feeding during the hospital stay. Informed her of the risks of using formula to supplement in the first few days of life as well as the benefits of successful breast milk feeding; referred her to the Breastfeeding booklet about this information. She acknowledges understanding of information reviewed and states that it is her plan to breastfeed her infant. Will support her choice and offer additional information as needed.      Hand Expression Education:  Mom taught how to manually hand express her colostrum.   Emphasized the importance of providing infant with valuable colostrum as infant rests skin to skin at breast.  Aware to avoid extended periods of non-feeding. Aware to offer 10-20+ drops of colostrum every 2-3 hours until infant is latching and nursing effectively. Taught the rationale behind this low tech but highly effective evidence based practice.     Comments: Pt will successfully establish breastfeeding by feeding in response to early feeding cues   or wake every 3h, will obtain deep latch, and will keep log of feedings/output. Taught to BF at hunger cues and or q 2-3 hrs and to offer 10-20 drops of hand expressed colostrum at any non-feeds.       Breast Assessment  Left Breast: Extra large  Left Nipple: Everted, Intact  Right Breast: Extra large  Right Nipple: Everted, Intact  Breast- Feeding Assessment  Attends Breast-Feeding Classes: Yes  Breast-Feeding Experience: No  Breast Trauma/Surgery: No  Type/Quality: Good (Mother states baby is breastfeeding well. )  Lactation Consultant Visits  Breast-Feedings: Good  (Baby has a tight frenulum. Baby did latch on and nursed well during visit. Mother states she is not having pain with feeding and does not want an ENT consult at this time. Mother given literature on tongue tie. )  Mother/Infant Observation  Mother Observation: Alignment, Breast comfortable, Close hold, Holds breast, Recognizes feeding cues  Infant Observation: Audible swallows, Feeding cues, Frenulum checked, Latches nipple and aereolae, Lips flanged, lower, Lips flanged, upper, Opens mouth, Rhythmic suck (Tight frenulum.  Mother denies need to see an ENT at this time)  [facility] Documentation  Latch: Grasps breast, tongue down, lips flanged, rhythmic sucking  Audible Swallowing: A few with stimulation  Type of Nipple: Everted (after stimulation)  Comfort (Breast/Nipple): Soft/non-tender  Hold (Positioning): Full assist, teach one side, mother does other, staff holds  [facility] Score: 8

## 2018-03-24 NOTE — ROUTINE PROCESS
Bedside and Verbal shift change report given to Aleida Gibson RN (oncoming nurse) by Richardson Quinn RN (offgoing nurse). Report included the following information SBAR, Kardex, Intake/Output, MAR and Accordion.

## 2018-03-24 NOTE — ROUTINE PROCESS
SBAR IN Report: Mother    Verbal report received from [address] [place] [address] (full name & credentials) on this patient, who is now being transferred from L&D (unit) for routine progression of care. Report consisted of patient's Situation, Background, Assessment and Recommendations (SBAR).  ID bands were compared with the identification form, and verified with the patient and transferring nurse. Information from the SBAR, Kardex, Intake/Output, MAR and Accordion and the Jessica Report was reviewed with the transferring nurse; opportunity for questions and clarification provided.

## 2018-03-24 NOTE — PROGRESS NOTES
S/ no complaints, voiding well    O/  VSS, AF          Abdomen NT, fundus firm    A/  Postpartum day 1 , stable    P/  Routine care

## 2018-03-25 VITALS
BODY MASS INDEX: 45.99 KG/M2 | DIASTOLIC BLOOD PRESSURE: 76 MMHG | WEIGHT: 293 LBS | OXYGEN SATURATION: 98 % | HEART RATE: 79 BPM | RESPIRATION RATE: 16 BRPM | TEMPERATURE: 97.4 F | HEIGHT: 67 IN | SYSTOLIC BLOOD PRESSURE: 134 MMHG

## 2018-03-25 PROCEDURE — 74011250637 HC RX REV CODE- 250/637: Performed by: ADVANCED PRACTICE MIDWIFE

## 2018-03-25 PROCEDURE — 77030021125

## 2018-03-25 RX ADMIN — IBUPROFEN 800 MG: 800 TABLET ORAL at 10:50

## 2018-03-25 RX ADMIN — IBUPROFEN 800 MG: 800 TABLET ORAL at 02:44

## 2018-03-25 NOTE — LACTATION NOTE
Problem: Lactation Care Plan  Goal: *Infant latching appropriately  Outcome: Resolved/Met Date Met: 03/25/18  Mom states baby nursing all the time. Infant appears to have a short frenulum.     Pt will successfully establish breastfeeding by feeding in response to early feeding cues   or wake every 3h, will obtain deep latch, and will keep log of feedings/output. Taught to BF at hunger cues and or q 2-3 hrs and to offer 10-20 drops of hand expressed colostrum at any non-feeds.       Breast Assessment  Left Breast: Extra large  Left Nipple: Everted, Short, Tender, Friction damage  Right Breast: Extra large  Right Nipple: Everted, Friction damage, Short  Breast- Feeding Assessment  Attends Breast-Feeding Classes: Yes  Breast-Feeding Experience: No  Breast Trauma/Surgery: No  Type/Quality: Good  Lactation Consultant Visits  Breast-Feedings: Fair (slips off niipple , appears to have a short frenulm.)  Mother/Infant Observation  Mother Observation: Alignment, Close hold, Holds breast, Lets baby end feeding, Nipple round on release  Infant Observation: Audible swallows, Latches nipple and aereolae, Lips flanged, lower, Lips flanged, upper, Opens mouth  LATCH Documentation  Latch: Grasps breast, tongue down, lips flanged, rhythmic sucking  Audible Swallowing: Spontaneous and intermittent (24 hours old)  Type of Nipple: Everted (after stimulation) (short)  Comfort (Breast/Nipple): Filling, red/small blisters/bruises, mild/mod discomfort (small crack and redness on both nipples)  Hold (Positioning): No assist from staff, mother able to position/hold infant  LATCH Score: 9        Goal: *Weight loss less than 10% of birth weight  Outcome: Resolved/Met Date Met: 03/25/18     Encouraged mom to attempt feeding with baby led feeding cues. Just as sucking on fingers, rooting, mouthing. Looking for 8-12 feedings in 24 hours. Don't limit baby at breast, allow baby to come of breast on it's own.  Baby may want to feed  often and may increase number of feedings on second day of life. Skin to skin encouraged.       If baby doesn't nurse,  Mom should  hand express  10-20 drops of colostrum and drip into baby's mouth, or give to baby by finger feeding, cup feeding, or spoon feeding at least every 2-3 hours.         Problem: Patient Education: Go to Patient Education Activity  Goal: Patient/Family Education  Outcome: Resolved/Met Date Met: 03/25/18  Discussed ways to help get a deeper latch. Discussed how a short frenulum may impact nursing. Parents have information.     Discussed eating a healthy diet. Instructed mother to eat a variety of foods in order to get a well balanced diet. She should consume an extra 300-500 calories per day (more than her non-pregnant requirement.) These extra calories will help provide energy needed for optimal breast milk production. Mother also encouraged to \"drink to thirst\" and it is recommended that she drink fluids such as water and fruit/vegetable juice. Nutritious snacks should be available so that she can eat throughout the day to help satisfy her hunger and maintain a good milk supply. Continue taking your prenatal vitamins as long as you breast feed.      Chart shows numerous feedings, void, stool WNL. Discussed Importance of monitoring outputs and feedings on first week of  Breastfeeding. Discussed ways to tell if baby getting enough, ie  Voids and stools, by day 7, baby should have at least  4-6 wet diapers a day, change in color of stool to a seedy yellow, and return to birth wt within 2 weeks with a steady increase after that. .  Follow up with pediatrician visit for weight check in 1-2 days reviewed. Discussed Breast feeding support groups and encouraged to call warm line number, [phone] or [facility] at [phone]  for any questions/problems that arise.      Encouraged mom to attempt feeding with baby led feeding cues. Just as sucking on fingers, rooting, mouthing.    Looking for 8-12 feedings in 24 hours. Don't limit baby at breast, allow baby to come of breast on it's own. Baby may want to feed  often and may increase number of feedings on second day of life. Skin to skin encouraged.       If baby doesn't nurse,  Mom should  Pump and give infant any expressed milk. If not pumping any milk, mom should contact pediatrician for possible need for supplementation.        Engorgement Care Guidelines:  Anticipatory guidance shared. Reviewed how milk is made and normal phases of milk production. Taught care of engorged breasts -and how to help. If mom should experience engorged breas frequent breastfeeding encouraged, cool packs and motrin as tolerated.   .        Call your doctor, midwife and/or lactation consultant if:   · Baby is having no wet or dirty diapers   · Baby has dark colored urine after day 3  (should be pale yellow to clear)   · Baby has dark colored stools after day 4  (should be mustard yellow, with no meconium)   · Baby has fewer wet/soiled diapers or nurses less   frequently than the goals listed here   · Mom has symptoms of mastitis   (sore breast with fever, chills, flu-like aching)

## 2018-03-25 NOTE — DISCHARGE INSTRUCTIONS
Medications:   *Ibuprofen (over the counter) up to 600 mg every 6 hours as needed for pain or cramping  *Continue Prenatal vitamins and DHA supplements while breastfeeding  *You may use a stool softener if needed until comfortable with bowel movements, may take up to 100 mg of docusate sodium (Colace- over the counter) twice daily    Appointments:   *Follow-up with CNM in 2 to 6 weeks as directed    Your Care Instructions  Congratulations on the birth of your baby. Like pregnancy, the  period can be a time of excitement, vania, and exhaustion. You may look at your wondrous little baby and feel happy. You may also be overwhelmed by your new sleep hours and new responsibilities. At first, babies often sleep during the days and are awake at night. They do not have a pattern or routine. They may make sudden gasps, jerk themselves awake, or look like they have crossed eyes. These are all normal, and they may even make you smile. In these first weeks after delivery, try to take good care of yourself. It may take 4 to 6 weeks to feel like yourself again, and possibly longer if you had a  birth. You will likely feel very tired for several weeks. Your days will be full of ups and downs, but lots of vania as well. Follow-up care is a key part of your treatment and safety. Be sure to make and go to all appointments, and call your midwife if you are having problems. It's also a good idea to know your test results and keep a list of the medicines you take. How can you care for yourself at home? Take care of your body after delivery  · Use pads instead of tampons for the bloody flow that may last as long as 2 weeks. · Ease cramps with ibuprofen (Advil). · Ease soreness of hemorrhoids and the area between your vagina and rectum with ice compresses or witch hazel pads. · Ease constipation by drinking lots of fluid and eating high-fiber foods. Ask your midwife about over-the-counter stool softeners.   · Cleanse yourself with a gentle squeeze of warm water from a bottle instead of wiping with toilet paper. · Take a sitz bath in warm water several times a day. · Wear a good nursing bra. Ease sore and swollen breasts with warm, wet washcloths. · If you are not breast-feeding, use ice rather than heat for breast soreness. · Your period may not start for several months if you are breast-feeding. You may bleed more, and longer at first, than you did before you got pregnant. · Wait until you are healed (about 4 to 6 weeks) before you have sexual intercourse. · Try not to travel with your baby for 5 or 6 weeks. If you take a long car trip, make frequent stops to walk around and stretch. Avoid exhaustion  · Rest every day. Try to nap when your baby naps. · Ask another adult to be with you for a few days after delivery. · Plan for  if you have other children. · Stay flexible so you can eat at odd hours and sleep when you need to. Both you and your baby are making new schedules. · Plan small trips to get out of the house. Change can make you feel less tired. · Ask for help with housework, cooking, and shopping. Remind yourself that your job is to care for your baby. Know about help for postpartum depression  · \"Baby blues are common for the first 1 to 2 weeks after birth. You may cry or feel sad or irritable for no reason. · Rest whenever you can. Being tired makes it harder to handle your emotions. · Go for walks with your baby. · Talk to your partner, friends, and family about your feelings. · If your symptoms last for more than a few weeks, or if you feel very depressed, ask your doctor for help. · Postpartum depression can be treated. Support groups and counseling can help. Sometimes medicine can also help. Stay healthy  · Eat healthy foods so you have more energy, make good breast milk, and lose extra baby pounds. · If you breast-feed, avoid alcohol and drugs. Stay smoke-free.  If you quit during pregnancy, congratulations. · Start daily exercise after 4 to 6 weeks, but rest when you feel tired. · Learn exercises to tone your belly. Do Kegel exercises to regain strength in your pelvic muscles. You can do these exercises while you stand or sit. ¨ Squeeze the same muscles you would use to stop your urine. Your belly and rear end (buttocks) should not move. ¨ Hold the squeeze for 3 seconds, then relax for 3 seconds. ¨ Repeat the exercise 10 to 15 times for each session. Do three or more sessions each day. · Find a class for new mothers and new babies that has an exercise time. · If you had a  birth, give yourself a bit more time before you exercise, and be careful. When should you call for help? Call 911 anytime you think you may need emergency care. For example, call if:  · You have sudden, severe pain in your belly. · You passed out (lost consciousness). Call your provider now or seek immediate medical care if:  · You have severe vaginal bleeding. You are passing blood clots and soaking through a pad each hour for 2 or more hours. · Your vaginal bleeding seems to be getting heavier or is still bright red 4 days after delivery, or you pass blood clots larger than the size of a golf ball. · You are dizzy or lightheaded, or you feel like you may faint. · You are vomiting or cannot keep fluids down. · You have a fever. · You have new or more belly pain. · You pass tissue (not just blood). · Your breast or breasts have hard, red, or tender areas. · You have an urgent or frequent need to urinate, along with a burning feeling. · You have severe pain, tenderness, or swelling in your vagina or the area between your rectum and vagina. · You have severe pains in your chest, belly, back, or legs. · You have feelings of severe despair or great anxiety. · Your baby is unusually cranky or is sleeping too much. · Your baby's eyes are red or have discharge.   · Your baby has white patches on the roof and sides of the mouth or tongue. · Your baby's umbilical cord is foul-smelling, swollen, red, or leaking pus. · There is blood or mucus in your baby's bowel movements. · Your baby has fewer than 6 wet diapers a day. · Your baby does not want to eat, or your baby is throwing up with every feeding. · Your baby has trouble breathing. · Your baby has a rectal temperature of 100.4°F or more, or an underarm temperature over 99.4°F.  · You baby has a low temperature less than 97.5°F rectal, or less than 97. 0°F underarm. · Your baby's skin looks yellow. Watch closely for changes in your health, and be sure to contact your doctor if you have any problems. Where can you learn more? Go to [employer]  Enter A461 in the search box to learn more about \"After Your Delivery (the Postpartum Period): After Your Visit. \"   © 6288-4049 Healthwise, [employer]. Care instructions adapted under license by [employer] (which disclaims liability or warranty for this information). This care instruction is for use with your licensed healthcare professional. If you have questions about a medical condition or this instruction, always ask your healthcare professional. Aliyah Diaster any warranty or liability for your use of this information. Content Version: 8.8.91591; Last Revised: July 8, 2010      Depression After Childbirth: After Your Birth  Many women get the \"baby blues\" during the first few days after childbirth. They may lose sleep, feel irritable, cry easily, and feel happy one minute and sad the next. Hormone changes are one cause of these emotional changes. Also, the demands of a new baby, coupled with visits from relatives or other family needs, add to a mother's stress. The \"baby blues\" usually peak around the fourth day and then ease up in less than 2 weeks.   If your moodiness or anxiety lasts for more than 2 weeks, or if you feel like life is not worth living, you may have postpartum depression. This is different for each mother. Some mothers with serious depression may worry intensely about their infant's well-being, while others may feel distant from their child. Some mothers might even feel that they might harm their baby. A mother may have signs of paranoia, wondering if someone is watching her. Depression is not a sign of weakness. It is a medical condition that requires treatment. Medicine and counseling are often effective at reducing depression. Talk to your midwife about taking antidepressant medicine while breast-feeding. Follow-up care is a key part of your treatment and safety. Be sure to make and go to all appointments, and call your midwife if you are having problems. It's also a good idea to know your test results and keep a list of the medicines you take. How can you care for yourself at home? · Take your medicines exactly as prescribed. Call your provider if you think you are having a problem with your medicine. · Eat a balanced diet, so that you can keep up your energy. · Get regular daily exercise, such as walks, to help improve your mood. · Get as much sunlight as possible. Keep your shades and curtains open, and get outside as much as you can. · Avoid using alcohol or other substances to feel better. · Get as much rest and sleep as possible, and avoid doing too much. Being too tired can increase depression. · Play stimulating music throughout your day and soothing music at night. · Schedule outings and visits with friends and family. Ask them to call you regularly, so that you do not feel alone. · Ask for help with preparing food and other daily tasks. Family and friends are often happy to help a mother with a . · Be honest with yourself and those who care about you. Tell them about your struggle. · Join a support group of new mothers. No one can better understand the challenges of caring for a  than other new mothers.   When should you call for help? Call 911 anytime you think you may need emergency care. For example, call if:  · You feel you cannot stop from hurting yourself or someone else. Call your provider now or seek immediate medical care if:  · You are having trouble caring for yourself or your baby. · You have signs of paranoia that can occur with postpartum depression. You fear that someone is watching you, stealing from you, or reading your mind. · You hear voices. · You have symptoms of postpartum depression, such as:  ¨ Sleeplessness. ¨ Anxiety. ¨ Hopelessness. ¨ Irritability. ¨ Poor concentration. · Someone you know has depression and:  ¨ Starts to give away his or her possessions. ¨ Uses illegal drugs or drinks alcohol heavily. ¨ Talks or writes about death, including writing suicide notes and talking about guns, knives, or pills. ¨ Starts to spend a lot of time alone. ¨ Acts very aggressively or suddenly appears calm. Watch closely for changes in your health, and be sure to contact your doctor if you have any problems. Where can you learn more? Go to [employer]  Enter E254 in the search box to learn more about \"Depression After Childbirth: After Your Visit. \"   © 4249-8294 Healthwise, [employer]. Care instructions adapted under license by Jessica Rosales (which disclaims liability or warranty for this information).  This care instruction is for use with your licensed healthcare professional. If you have questions about a medical condition or this instruction, always ask your healthcare p  [id number] [facility]       Name:  Albaro King  YOB: 1985  Admission Diagnosis:  Pregnancy  Breech  Oligohydramnios     Discharge Diagnosis:    Problem List as of 3/25/2018  Date Reviewed: 3/23/2018          Codes Class Noted - Resolved    Oligohydramnios ICD-10-CM: O41.00X0  ICD-9-CM: 658.00  3/23/2018 - Present        Prenatal care of primigravida, antepartum ICD-10-CM: Z34.00  ICD-9-CM: V22.0  8/28/2017 - Present    Overview Addendum 3/2/2018 11:44 AM by Davian Her     Fh hemochromatosis: FOB GF: FOB plans to be tested, rec d/w pediatrician prn  ? NT: unable to calculate: plan TS  Pt will rtc for lab only  Obesity: early glucola: #133, serial US at 32 wks q 4 wk  FH Kiswahili: hgb electro: AA  Growth f/u w/ MFM Thursday 12/28/17 q4w w MFM for obesity  Ant plac  Prefer to avoid pitocin in labor  Prefers natural labor/avoid epidural  Avoid NO: mother has allergy  Plan cord blood collection  Breech at 33 wks  Repeat C/S 3/30/18. Pt notified. Instructions sent. Attending Physician:  Christie Lange MD    Delivery Type:  Vaginal Childbirth with Episiotomy, Laceration or Tear: What To Expect At [facility] body will slowly heal in the next few weeks. It is easy to get too tired and overwhelmed during the first weeks after your baby is born. Changes in your hormones can shift your mood without warning. You may find it hard to meet the extra demands on your energy and time. Take it easy on yourself. Follow-up care is a key part of your treatment and safety. Be sure to make and go to all appointments, and call your doctor if you are having problems. It's also a good idea to know your test results and keep a list of the medicines you take. How can you care for yourself at home? Vaginal Bleeding and Cramps  · After delivery, you will have a bloody discharge from the vagina. This will turn pink within a week and then white or yellow after about 10 days. It may last for 2 to 4 weeks or longer, until the uterus has healed. Use pads instead of tampons until you stop bleeding. · Do not worry if you pass some blood clots, as long as they are smaller than a golf ball. If you have a tear or stitches in your vaginal area, change the pad at least every 4 hours to prevent soreness and infection.     · You may have cramps for the first few days after childbirth. These are normal and occur as the uterus shrinks to normal size. Take an over-the-counter pain medicine, such as acetaminophen (Tylenol), ibuprofen (Advil, Motrin), or naproxen (Aleve), for cramps. Read and follow all instructions on the label. Do not take aspirin, because it can cause more bleeding. Do not take acetaminophen (Tylenol) and other acetaminophen containing medications (i.e. Percocet) at the same time. Episiotomy, Lacerations or Tears  · If you have stitches, they will dissolve on their own and do not need to be removed. · Put ice or a cold pack on your painful area for 10 to 20 minutes at a time, several times a day, for the first few days. Put a thin cloth between the ice and your skin. · Sit in a few inches of warm water (sitz bath) 3 times a day and after bowel movements. The warm water helps with pain and itching. If you do not have a tub, a warm shower might help. Breast fullness  · Your breasts may overfill (engorge) in the first few days after delivery. To help milk flow and to relieve pain, warm your breasts in the shower or by using warm, moist towels before nursing. · If you are not nursing, do not put warmth on your breasts or touch your breasts. Wear a tight bra or sports bra and use ice until the fullness goes away. This usually takes 2 to 3 days. · Put ice or a cold pack on your breast after nursing to reduce swelling and pain. Put a thin cloth between the ice and your skin. Activity  · Eat a balanced diet. Do not try to lose weight by cutting calories. Keep taking your prenatal vitamins, or take a multivitamin. · Get as much rest as you can. Try to take naps when your baby sleeps during the day. · Get some exercise every day. But do not do any heavy exercise until your doctor says it is okay. · Wait until you are healed (about 4 to 6 weeks) before you have sexual intercourse. Your doctor will tell you when it is okay to have sex.   · Talk to your doctor about birth control. You can get pregnant even before your period returns. Also, you can get pregnant while you are breast-feeding. Mental Health  · Many women get the \"baby blues\" during the first few days after childbirth. You may lose sleep, feel irritable, and cry easily. You may feel happy one minute and sad the next. Hormone changes are one cause of these emotional changes. Also, the demands of a new baby, along with visits from relatives or other family needs, add to a mother's stress. The \"baby blues\" often peak around the fourth day. Then they ease up in less than 2 weeks. · If your moodiness or anxiety lasts for more than 2 weeks, or if you feel like life is not worth living, you may have postpartum depression. This is different for each mother. Some mothers with serious depression may worry intensely about their infant's well-being. Others may feel distant from their child. Some mothers might even feel that they might harm their baby. A mother may have signs of paranoia, wondering if someone is watching her. · With all the changes in your life, you may not know if you are depressed. Pregnancy sometimes causes changes in how you feel that are similar to the symptoms of depression. · Symptoms of depression include:  · Feeling sad or hopeless and losing interest in daily activities. These are the most common symptoms of depression. · Sleeping too much or not enough. · Feeling tired. You may feel as if you have no energy. · Eating too much or too little. · [employer] (PSI) offers a Warm line; Chat with the Expert phone sessions; Information and Articles about Pregnancy and Postpartum Mood Disorders; Comprehensive List of Free Support Groups; Knowledgeable local coordinators who will offer support, information, and resources; Guide to Resources on 25eight; Calendar of events in the  mood disorders community;  Latest News and Research; and [facility] Section for Access and Networking. Remember - You are not alone; You are not to blame; With help, you will be well. 8-133-212-PPD(4162). WWW. POSTPARTUM. NET    · Writing or talking about death, such as writing suicide notes or talking about guns, knives, or pills. Keep the numbers for these national suicide hotlines: 1-975-365-TALK (4-815.776.5119) and 9-748-EJVAAFT (1-764.790.9891). If you or someone you know talks about suicide or feeling hopeless, get help right away. Constipation and Hemorrhoids  Drink plenty of fluids, enough so that your urine is light yellow or clear like water. If you have kidney, heart, or liver disease and have to limit fluids, talk with your doctor before you increase the amount of fluids you drink. · Eat plenty of fiber each day. Have a bran muffin or bran cereal for breakfast, and try eating a piece of fruit for a mid-afternoon snack. · For painful, itchy hemorrhoids, put ice or a cold pack on the area several times a day for 10 minutes at a time. Follow this by putting a warm compress on the area for another 10 to 20 minutes or by sitting in a shallow, warm bath. When should you call for help? Call 911 anytime you think you may need emergency care. For example, call if:  · You are thinking of hurting yourself, your baby, or anyone else. · You passed out (lost consciousness). · You have symptoms of a blood clot in your lung (called a pulmonary embolism). These may include:  · Sudden chest pain. · Trouble breathing. · Coughing up blood. Call your doctor now or seek immediate medical care if:  · You have severe vaginal bleeding. · You are soaking through a pad each hour for 2 or more hours. · Your vaginal bleeding seems to be getting heavier or is still bright red 4 days after delivery. · You are dizzy or lightheaded, or you feel like you may faint. · You are vomiting or cannot keep fluids down. · You have a fever. · You have new or more belly pain.   · You pass tissue (not just blood). · Your vaginal discharge smells bad. · Your belly feels tender or full and hard. · Your breasts are continuously painful or red. · You feel sad, anxious, or hopeless for more than a few days. · You have sudden, severe pain in your belly. · You have symptoms of a blood clot in your leg (called a deep vein thrombosis), such as:  · Pain in your calf, back of the knee, thigh, or groin. · Redness and swelling in your leg or groin. · You have symptoms of preeclampsia, such as:  · Sudden swelling of your face, hands, or feet. · New vision problems (such as dimness or blurring). · A severe headache. · Your blood pressure is higher than it should be or rises suddenly. · You have new nausea or vomiting. Watch closely for changes in your health, and be sure to contact your doctor if you have any problems. Additional Information:  Postpartum Support    PARENTS:  Are you feeling sad or depressed? Is it difficult for you to enjoy yourself? Do you feel more irritable or tense? Do you feel anxious or panicky? Are you having difficulty bonding with your baby? Do you feel as if you are \"out of control\" or \"going crazy\"? Are you worried that you might hurt your baby or yourself? FAMILIES: Do you worry that something is wrong but don't know how to help? Do you think that your partner or spouse is having problems coping? Are you worried that it may never get better? While many women experience some mild mood change or \"the blues\" during or after the birth of a child, 1 in 9 women experience more significant symptoms of depression or anxiety. 1 in 10 Dads become depressed during the first year. Things you can do  Being a good parent includes taking care of yourself. If you take care of yourself, you will be able to take better care of your baby and your family. · Talk to a counselor or healthcare provider who has training in  mood and anxiety problems.   · Learn as much as you can about pregnancy and postpartum depression and anxiety. · Get support from family and friends. Ask for help when you need it. · Join a support group in your area or online. · Keep active by walking, stretching or whatever form of exercise helps you to feel better. · Get enough rest and time for yourself. · Eat a healthy diet. · Don't give up! It may take more than one try to get the right help you need. These are general instructions for a healthy lifestyle:    No smoking/ No tobacco products/ Avoid exposure to second hand smoke    Surgeon General's Warning:  Quitting smoking now greatly reduces serious risk to your health. Obesity, smoking, and sedentary lifestyle greatly increases your risk for illness    A healthy diet, regular physical exercise & weight monitoring are important for maintaining a healthy lifestyle    Recognize signs and symptoms of STROKE:    F-face looks uneven    A-arms unable to move or move unevenly    S-speech slurred or non-existent    T-time-call 911 as soon as signs and symptoms begin - DO NOT go       back to bed or wait to see if you get better - TIME IS BRAIN. I have had the opportunity to make my options or choices for discharge. I have received and understand these instructions. [facility] disclaims any warranty or liability for your use of this information. Content Version: 8.8.66422; Last Revised: April 27, 2009  Thank you for choosing [facility]. We know you have many options when it comes to your healthcare; we appreciate that you picked us. Our goal is to provide exceptional service and world class care for every patient. You may receive a survey in the mail or by email asking for your feedback. Please take a few minutes to share your thoughts about your recent visit. Your comments helps us understand what we do well and what we can do better.        To ensure confidentiality, this survey is administered by an independent third-party, [address] participation will help us to continue and improve the quality of care that we provide to you, your family, friends, and neighbors. Thank you!

## 2018-03-25 NOTE — PROGRESS NOTES
Post-Partum Day Number 1 Progress Note      Patient doing well post-partum without significant complaint. She is voiding without difficulty, she reports normal lochia. She is ambulatiing without dizziness. Her pain is well controlled with oral pain medication. She is tolerating general diet. Vitals:  Patient Vitals for the past 8 hrs:   BP Temp Pulse Resp   18 0554 134/76 97.4 °F (36.3 °C) 79 16     Temp (24hrs), Av.7 °F (36.5 °C), Min:97.4 °F (36.3 °C), Max:97.9 °F (36.6 °C)        Exam:  Patient without distress. Lungs: CTA bilaterally               CV: regular rate/rhythm, no rubs or gallops, no murmur               Abdomen soft, nontender, nondistended, normal bowel sounds               Uterus: fundus firm at level of umbilicus, nontender               Lower extremities are negative for cords or tenderness, noswelling. Labs: No results found for this or any previous visit (from the past 24 hour(s)). Assessment and Plan:   Postpartum Day #1 S/P . Doing well. - routine care   - anticipate discharge today.  - good pain control with Motrin declines need for additional medication  -discharge instructions reviewed. -Call office on Monday to schedule follow up appointment in 2-6 weeks.

## 2018-03-25 NOTE — ROUTINE PROCESS
[phone] Residents called to inquire when they will be coming to see discharges. Per MD, attending still rounding with adults. 200 Dr. Godfrey Krishnamurthy on unit to assess baby. Waiting for attending. 1430 Dr. Katerine Cage on unit to see . 1520 Patient off unit in stable condition via wheelchair  for discharge home per CYRUS Murcia . Patient is to follow-up in 6 weeks. No rescriptions given to patient. Infant in car seat with mother.

## 2018-03-25 NOTE — ROUTINE PROCESS
Bedside and Verbal shift change report given to Jose De Jesus Hyde RN (oncoming nurse) by Naz Sparks RN (offgoing nurse). Report given with SBAR, Kardex, Intake/Output and MAR.

## 2018-03-25 NOTE — DISCHARGE SUMMARY
Obstetrical Discharge Summary     Name: Dede Tao MRN: [id number]  SSN: [id number]    YOB: 1985  Age: 29 y.o. Sex: female      Allergies: Meghna Chung Date: 3/23/2018    Discharge Date: 3/25/2018     Admitting Physician: Kalia Noe MD     Attending Physician:  Elysia Rosas MD     * Admission Diagnoses: Pregnancy  Vertex  Oligohydramnios    * Discharge Diagnoses:   Information for the patient's :  Montrell Eldridge, Female [[id number]]   Delivery of a 6 lb 4.5 oz (2.848 kg) female infant via Vaginal, Spontaneous Delivery on 3/23/2018 at 11:19 PM  by CYRUS Devlin. Apgars were 9 and 9.        Additional Diagnoses:   Hospital Problems as of 3/25/2018  Date Reviewed: 3/23/2018          Codes Class Noted - Resolved POA    Oligohydramnios ICD-10-CM: O41.00X0  ICD-9-CM: 658.00  3/23/2018 - Present Unknown             Lab Results   Component Value Date/Time    Rubella, External immune 2017    GrBStrep, External Negative 2018    ABO,Rh O Positive 2017      Immunization History   Administered Date(s) Administered    Influenza Vaccine (Quad) PF 10/10/2017    Tdap 2018       * Procedures:  with repair of laceration      [place]  Depression Scale  I have been able to laugh and see the funny side of things: As much as I always could  I have looked forward with enjoyment to things: As much as I ever did  I have blamed myself unnecessarily when things went wrong: Yes, some of the time  I have been anxious or worried for no good reason: Yes, sometimes  I have felt scared or panicky for no very good reason: No, not much  Things have been getting on top of me: Yes, sometimes I haven't been coping as well as usual  I have been so unhappy that I have had difficulty sleeping: No, not at all  I have felt sad or miserable: No, not at all  I have been so unhappy that I have been crying: No, never  The thought of harming myself has occurred to me: Never  Total Score: 7    * Discharge Condition: good    [facility] Course: Normal hospital course following the delivery. * Disposition: Home    Discharge Medications:   Current Discharge Medication List      CONTINUE these medications which have NOT CHANGED    Details   raNITIdine (ZANTAC) 150 mg tablet Take 150 mg by mouth two (2) times a day. PRENATAL VITS W-CA,FE,FA,1 MG, (PRENATAL 1 + IRON PO) Take  by mouth. STOP taking these medications       diphenhydrAMINE (BENADRYL) 25 mg capsule Comments:   Reason for Stopping:               * Follow-up Care/Patient Instructions:   Activity: Activity as tolerated, No sex for 6 weeks and No heavy lifting for 6 weeks  Diet: Regular Diet  Wound Care: Keep wound clean and dry    Follow-up Information     Follow up With Details Comments Contact Info    Dr. Evans Fret By:  Karine Tripathi CNM     March 25, 2018

## 2018-03-25 NOTE — ROUTINE PROCESS
Bedside and Verbal shift change report given to Gabo Mckinley RN (oncoming nurse) by Darshana Ortega RN (offgoing nurse). Report included the following information SBAR, Kardex, Intake/Output and MAR.

## 2018-03-27 ENCOUNTER — TELEPHONE (OUTPATIENT)
Dept: OBGYN CLINIC | Age: 33
End: 2018-03-27

## 2018-03-27 NOTE — TELEPHONE ENCOUNTER
Patient calling stating that she delivered  on 3/23/2018 and was advised at discharge to watch the bleeding and if it gets heavier to contact the office. Patient states that when she was discharged, her flow was light/pink/mucousy and now it is bright red, moderate in flow. Patient denies passing clots but her pain level has increased and feel likes bad period cramping. Patient is breast feeding. Patient advised that she can take ibuprofen for the cramping but advised that breastfeeding and pumping can cause the uterus to contract which can cause period like cramping. Patient felt better after speaking with me but wished to have the message sent to Dr. Matt Eaton. Please advise.

## 2018-05-04 ENCOUNTER — OFFICE VISIT (OUTPATIENT)
Dept: OBGYN CLINIC | Age: 33
End: 2018-05-04

## 2018-05-04 VITALS
WEIGHT: 283 LBS | SYSTOLIC BLOOD PRESSURE: 110 MMHG | DIASTOLIC BLOOD PRESSURE: 70 MMHG | BODY MASS INDEX: 44.42 KG/M2 | HEIGHT: 67 IN

## 2018-05-04 PROBLEM — E66.01 OBESITY, MORBID (HCC): Status: ACTIVE | Noted: 2018-05-04

## 2018-05-04 RX ORDER — ACETAMINOPHEN AND CODEINE PHOSPHATE 120; 12 MG/5ML; MG/5ML
1 SOLUTION ORAL DAILY
Qty: 1 PACKAGE | Refills: 2 | Status: SHIPPED | OUTPATIENT
Start: 2018-05-04 | End: 2018-06-26 | Stop reason: SDUPTHER

## 2018-05-04 RX ORDER — MINERAL OIL
ENEMA (ML) RECTAL
COMMUNITY
End: 2019-09-11

## 2018-05-04 NOTE — MR AVS SNAPSHOT
[address] [facility] [address] [id number] Patient: Jose Mercer MRN: [id number] OUH:[birth year] Visit Information Date & Time Provider Department Dept. Phone Encounter #  
 5/4/2018  9:30 AM Elsie Michael MD Brandon Harry [phone] [id number] Upcoming Health Maintenance Date Due Influenza Age 5 to Adult 8/1/2018 PAP AKA CERVICAL CYTOLOGY 8/28/2020 Allergies as of 5/4/2018  Review Complete On: 5/4/2018 By: Tomasa Soler LPN Severity Noted Reaction Type Reactions Tancred High 08/28/2017    Anaphylaxis Current Immunizations  Reviewed on 3/25/2018 Name Date Influenza Vaccine (Quad) PF 10/10/2017 Tdap 2/1/2018 Not reviewed this visit Vitals BP Height(growth percentile) Weight(growth percentile) Breastfeeding? BMI OB Status 110/70 5' 7\" (1.702 m) 283 lb (128.4 kg) Yes 44.32 kg/m2 Recent pregnancy Smoking Status Never Smoker BMI and BSA Data Body Mass Index Body Surface Area 44.32 kg/m 2 2.46 m 2 Preferred Pharmacy Pharmacy Name Phone [id number] [place], [address] [phone] Your Updated Medication List  
  
   
This list is accurate as of 5/4/18  9:31 AM.  Always use your most recent med list. AMBULATORY BREAST PUMP Use as directed  
  
 fexofenadine 180 mg tablet Commonly known as:  Fabienne Munda Take  by mouth. PRENATAL 1 + IRON PO Take  by mouth. ZANTAC 150 mg tablet Generic drug:  raNITIdine Take 150 mg by mouth two (2) times a day. Patient Instructions Stress in Parents of Infants: Care Instructions Your Care Instructions Meeting the increased demands of being a new parent can be a big challenge.  It is easy to get overtired and overwhelmed during the first weeks. What used to be a simple chore, such as buying groceries, is not so simple now. Plus, you have new chores, including feeding and changing your new baby. At the end of the day, you may be so tired that you feel like crying. Instead of looking forward to the next day, you may be dreading tomorrow. Like many new parents, you are burned out from the stress of having a new baby. Stress affects each of us differently, and the most effective ways to relieve it are different for each person. You can try different methods to find out which ones work best for you. As the weeks go by, you will begin to develop a rhythm with your baby. Tasks that now seem to take forever will become easier. Many women get the \"baby blues\" during the first few days after childbirth. If you are a new mother and the \"baby blues\" last more than a few days, call your doctor right away. Depression is a medical condition that requires treatment. Follow-up care is a key part of your treatment and safety. Be sure to make and go to all appointments, and call your doctor if you are having problems. It's also a good idea to know your test results and keep a list of the medicines you take. How can you care for yourself at home? · Be kind to yourself. Your new baby takes a lot of work, but he or she can give you a lot of pleasure too. Do not worry about housekeeping for a while. · Allow your friends to bring you meals or do chores. · Limit visitors to as few as you feel you can handle, or ask them not to visit for a while. Before they come, set a limit on how long they will stay. · Sleep when your baby sleeps. Even a short nap helps. · Find what triggers your stress, and avoid those things as much as you can. MCS comment for future enhancement · If you breastfeed, learn how to collect and store some breast milk so your partner or  can feed the baby while you sleep. · Eat a balanced diet so you can keep up your energy. · Drink plenty of fluids throughout the day. · Avoid caffeine and alcohol. Caffeine is found in coffee, tea, cola drinks, chocolate, and other foods. · Limit medicines that can make you more tired, such as tranquilizers and cold and allergy medicines. · Get regular daily exercise, such as walks, to help improve your mood. Rest after you exercise. · Be honest with yourself and those who care about you. Tell them you are stressed and tired. · Talking to other new parents can help. Ask your doctor or child's doctor to suggest support groups for new parents. Hearing that someone else is having the same experiences you are can help a lot. · If you have the baby blues for more than a few days, call your doctor right away. When should you call for help? Call 911 anytime you think you may need emergency care. For example, call if: 
? · You have thoughts of hurting yourself, your baby, or another person. ?Call your doctor now or seek immediate medical care if: 
? · You are having trouble caring for yourself or your baby. ? Watch closely for changes in your health, and be sure to contact your doctor if you have any problems. Where can you learn more? Go to http://chris-danette.info/. Enter H142 in the search box to learn more about \"Stress in Parents of Infants: Care Instructions. \" Current as of: May 12, 2017 Content Version: 11.4 © 3579-8246 [employer]. Care instructions adapted under license by [employer] (which disclaims liability or warranty for this information). If you have questions about a medical condition or this instruction, always ask your healthcare professional. [address] any warranty or liability for your use of this information. Introducing [facility]! Dear Aaron Argue: Thank you for requesting a [employer] account. Our records indicate that you already have an active [employer] account.   You can access your account anytime at [email or web address]. [email or web address] Did you know that you can access your hospital and ER discharge instructions at any time in [facility]? You can also review all of your test results from your hospital stay or ER visit. Additional Information If you have questions, please visit the Frequently Asked Questions section of the [facility] website at [email or web address] Remember, [facility] is NOT to be used for urgent needs. For medical emergencies, dial 911. Now available from your iPhone and Android! Please provide this summary of care documentation to your next provider. Your primary care clinician is listed as PROVIDER UNKNOWN. If you have any questions after today's visit, please call [phone].

## 2018-05-04 NOTE — PATIENT INSTRUCTIONS
Stress in Parents of Infants: Care Instructions  Your Care Instructions    Meeting the increased demands of being a new parent can be a big challenge. It is easy to get overtired and overwhelmed during the first weeks. What used to be a simple chore, such as buying groceries, is not so simple now. Plus, you have new chores, including feeding and changing your new baby. At the end of the day, you may be so tired that you feel like crying. Instead of looking forward to the next day, you may be dreading tomorrow. Like many new parents, you are burned out from the stress of having a new baby. Stress affects each of us differently, and the most effective ways to relieve it are different for each person. You can try different methods to find out which ones work best for you. As the weeks go by, you will begin to develop a rhythm with your baby. Tasks that now seem to take forever will become easier. Many women get the \"baby blues\" during the first few days after childbirth. If you are a new mother and the \"baby blues\" last more than a few days, call your doctor right away. Depression is a medical condition that requires treatment. Follow-up care is a key part of your treatment and safety. Be sure to make and go to all appointments, and call your doctor if you are having problems. It's also a good idea to know your test results and keep a list of the medicines you take. How can you care for yourself at home? · Be kind to yourself. Your new baby takes a lot of work, but he or she can give you a lot of pleasure too. Do not worry about housekeeping for a while. · Allow your friends to bring you meals or do chores. · Limit visitors to as few as you feel you can handle, or ask them not to visit for a while. Before they come, set a limit on how long they will stay. · Sleep when your baby sleeps. Even a short nap helps. · Find what triggers your stress, and avoid those things as much as you can.  MCS comment for future enhancement  · If you breastfeed, learn how to collect and store some breast milk so your partner or  can feed the baby while you sleep. · Eat a balanced diet so you can keep up your energy. · Drink plenty of fluids throughout the day. · Avoid caffeine and alcohol. Caffeine is found in coffee, tea, cola drinks, chocolate, and other foods. · Limit medicines that can make you more tired, such as tranquilizers and cold and allergy medicines. · Get regular daily exercise, such as walks, to help improve your mood. Rest after you exercise. · Be honest with yourself and those who care about you. Tell them you are stressed and tired. · Talking to other new parents can help. Ask your doctor or child's doctor to suggest support groups for new parents. Hearing that someone else is having the same experiences you are can help a lot. · If you have the baby blues for more than a few days, call your doctor right away. When should you call for help? Call 911 anytime you think you may need emergency care. For example, call if:  ? · You have thoughts of hurting yourself, your baby, or another person. ?Call your doctor now or seek immediate medical care if:  ? · You are having trouble caring for yourself or your baby. ? Watch closely for changes in your health, and be sure to contact your doctor if you have any problems. Where can you learn more? Go to http://chris-danette.info/. Enter H142 in the search box to learn more about \"Stress in Parents of Infants: Care Instructions. \"  Current as of: May 12, 2017  Content Version: 11.4  © 0219-4551 [employer]. Care instructions adapted under license by [employer] (which disclaims liability or warranty for this information).  If you have questions about a medical condition or this instruction, always ask your healthcare professional. [address] any warranty or liability for your use of this information.

## 2018-05-04 NOTE — PROGRESS NOTES
Vanita Ly MD, [address] [facility]     Postpartum visit    Chief Complaint   Patient presents with   [facility]       Court Omalley is a 29 y.o. female  who presents for a postpartum exam.     She is now 6 weeks from a vaginal delivery delivery. No LMP recorded. She has had the following significant problems since her delivery: none. She reports her mood as Good. The patient is breastfeeding/pumping breast milk for her baby, and bottle feeding. The patient would like to use OCP for birth control. She is due for her next AE in 3 months. Past Medical History:   Diagnosis Date    Pap smear for cervical cancer screening 2017    Negative, HPV negative    Routine Papanicolaou smear     Negative per pt. Past Surgical History:   Procedure Laterality Date    HX WISDOM TEETH EXTRACTION  2017     Current Outpatient Prescriptions   Medication Sig    fexofenadine (ALLEGRA) 180 mg tablet Take  by mouth.  norethindrone (MICRONOR) 0.35 mg tab Take 1 Tab by mouth daily.  raNITIdine (ZANTAC) 150 mg tablet Take 150 mg by mouth two (2) times a day.  AMBULATORY BREAST PUMP Use as directed    PRENATAL VITS W-CA,FE,FA,1 MG, (PRENATAL 1 + IRON PO) Take  by mouth. No current facility-administered medications for this visit. Allergies   Allergen Reactions    [place] Anaphylaxis     Family History   Problem Relation Age of Onset    Colon Cancer Maternal Grandfather     Diabetes Paternal Grandmother      Social History     Social History    Marital status:      Spouse name: N/A    Number of children: N/A    Years of education: N/A     Occupational History    Not on file.      Social History Main Topics    Smoking status: Never Smoker    Smokeless tobacco: Never Used      Comment: Never used vapor or e-cigs     Alcohol use No    Drug use: No    Sexual activity: Yes     Partners: Male     Birth control/ protection: None     Other Topics Concern    Not on file     Social History Narrative     OB History      Para Term  AB Living    1 1 1   1    SAB TAB Ectopic Molar Multiple Live Births         1          Immunization History   Administered Date(s) Administered    Influenza Vaccine (Quad) PF 10/10/2017    Tdap 2018       Review of Systems:  History obtained from the patient  General ROS: negative for - chills, fever or weight loss  Respiratory ROS: no cough, shortness of breath, or wheezing  Cardiovascular ROS: no chest pain or dyspnea on exertion  Gastrointestinal ROS: negative for - appetite loss, change in bowel habits or nausea/vomiting  Genito-Urinary ROS: negative except for as noted in HPI    PHYSICAL EXAMINATION  Visit Vitals    /70    Ht 5' 7\" (1.702 m)    Wt 283 lb (128.4 kg)    Breastfeeding Yes    BMI 44.32 kg/m2       Constitutional  · Appearance: well-nourished, well developed, alert, in no acute distress    HENT  · Head and Face: appears normal    Neck  · Inspection/Palpation: normal appearance, no masses or tenderness  · Lymph Nodes: no lymphadenopathy present  · Thyroid: gland size normal, nontender, no nodules or masses present on palpation    Chest  clear to auscultation, no wheezes, rales or rhonchi, symmetric air entry. Cardiac/CVS  normal rate, regular rhythm, normal S1, S2, no murmurs, rubs, clicks or gallops.     Breasts  · Inspection of Breasts: breasts symmetrical, no skin changes, no discharge present, nipple appearance normal, no skin retraction present  · Palpation of Breasts and Axillae: no masses present on palpation, no breast tenderness  · Axillary Lymph Nodes: no lymphadenopathy present    Gastrointestinal  · Abdominal Examination: abdomen non-tender to palpation, normal bowel sounds, no masses present  · Liver and spleen: no hepatomegaly present, spleen not palpable  · Hernias: no hernias identified    Genitourinary  · External Genitalia: normal appearance for age, no discharge present, no tenderness present, no inflammatory lesions present, no masses present, no atrophy present, loose/free dissolving suture removed   · Vagina: normal vaginal vault without central or paravaginal defects, no discharge present, no inflammatory lesions present, no masses present  · Bladder: non-tender to palpation  · Urethra: appears normal  · Cervix: normal   · Uterus: normal size, shape and consistency  · Adnexa: no adnexal tenderness present, no adnexal masses present  · Perineum: perineum within normal limits, no evidence of trauma, no rashes or skin lesions present  · Anus: anus within normal limits  · Inguinal Lymph Nodes: no lymphadenopathy present    Skin  · General Inspection: no rash, no lesions identified    Neurologic/Psychiatric  · Mental Status:  · Orientation: grossly oriented to person, place and time  · Mood and Affect: mood normal, affect appropriate    Assessment:  Normal postpartum check  Encounter Diagnosis   Name Primary?  Routine postpartum follow-up Yes       Plan:  RTO for AE or sooner prn  Discussed contraception options; r/b/a. Planned contraception: Micronor  Recommend back up with micronor/minipill with new start and if not exclusively breast feeding. Reviewed the importance of taking it at the same time each day. She should return to normal activity  We recommend healthy balanced diet, regular exercise  We discussed safer sex practices, condom use and risk factors for sexually transmitted diseases.    Patient should notify MD if she cannot resume normal activity and exercise  Recommended continuing prenatal vitamins/folic acid

## 2018-06-21 ENCOUNTER — TELEPHONE (OUTPATIENT)
Dept: OBGYN CLINIC | Age: 33
End: 2018-06-21

## 2018-06-21 NOTE — TELEPHONE ENCOUNTER
[facility] pahrmacy calling to change the patient's rx for Micronor to a 90 day supply but patient is due for an AE at the beginning of August and needs to be seen first before having a 90 day supply. [facility] stated they would reach out to the patient .

## 2018-06-26 ENCOUNTER — TELEPHONE (OUTPATIENT)
Dept: OBGYN CLINIC | Age: 33
End: 2018-06-26

## 2018-06-26 RX ORDER — ACETAMINOPHEN AND CODEINE PHOSPHATE 120; 12 MG/5ML; MG/5ML
1 SOLUTION ORAL DAILY
Qty: 3 PACKAGE | Refills: 0 | Status: SHIPPED | OUTPATIENT
Start: 2018-06-26 | End: 2018-08-17

## 2018-06-26 NOTE — TELEPHONE ENCOUNTER
[facility] pharmacy calling stating that the patient's insurance will not cover 1 pack at a time anymore for her micronor and needs a rx for 90 day supply and the patient only has 1 pack left for her original rx and her AE has been scheduled for 8/6/18.    90 day rx will be sent.

## 2018-08-06 ENCOUNTER — TELEPHONE (OUTPATIENT)
Dept: OBGYN CLINIC | Age: 33
End: 2018-08-06

## 2018-08-06 ENCOUNTER — OFFICE VISIT (OUTPATIENT)
Dept: OBGYN CLINIC | Age: 33
End: 2018-08-06

## 2018-08-06 NOTE — TELEPHONE ENCOUNTER
Sorry about that. Does she have a preferred option?  If not can do  loestrin 1/20 fe 1po every day  3mo supply  No rf until ae

## 2018-08-06 NOTE — TELEPHONE ENCOUNTER
Pt arrived to office and needed to reschedule her AE due to MD in surgery. Pt has weaned breastfeeding as of this past Friday. Pt is requesting a 1 month supply of a new OCP in place of the Micronor. Please advise on new OCP prescription.

## 2018-08-08 RX ORDER — NORETHINDRONE ACETATE AND ETHINYL ESTRADIOL 1MG-20(21)
1 KIT ORAL DAILY
Qty: 3 PACKAGE | Refills: 0 | Status: SHIPPED | OUTPATIENT
Start: 2018-08-08 | End: 2018-08-17 | Stop reason: SDUPTHER

## 2018-08-17 ENCOUNTER — OFFICE VISIT (OUTPATIENT)
Dept: OBGYN CLINIC | Age: 33
End: 2018-08-17

## 2018-08-17 VITALS
HEIGHT: 67 IN | SYSTOLIC BLOOD PRESSURE: 126 MMHG | DIASTOLIC BLOOD PRESSURE: 74 MMHG | BODY MASS INDEX: 45.99 KG/M2 | WEIGHT: 293 LBS

## 2018-08-17 DIAGNOSIS — Z01.419 ENCOUNTER FOR GYNECOLOGICAL EXAMINATION (GENERAL) (ROUTINE) WITHOUT ABNORMAL FINDINGS: Primary | ICD-10-CM

## 2018-08-17 DIAGNOSIS — N93.9 ABNORMAL UTERINE BLEEDING: ICD-10-CM

## 2018-08-17 RX ORDER — NORETHINDRONE ACETATE AND ETHINYL ESTRADIOL 1MG-20(21)
1 KIT ORAL DAILY
Qty: 3 PACKAGE | Refills: 4 | Status: SHIPPED | OUTPATIENT
Start: 2018-08-17 | End: 2019-09-11 | Stop reason: SDUPTHER

## 2018-08-17 NOTE — MR AVS SNAPSHOT
900 [facility] 305 1007 [facility] 
[phone] Patient: Davion Blackburn MRN: [id number] MKV:8/20/1946 Visit Information Date & Time Provider Department Dept. Phone Encounter #  
 8/17/2018  8:40 AM Geovanni Crandall MD Brandon Harry [phone] [id number] Upcoming Health Maintenance Date Due Influenza Age 5 to Adult 8/1/2018 PAP AKA CERVICAL CYTOLOGY 8/28/2020 Allergies as of 8/17/2018  Review Complete On: 8/17/2018 By: Everett Martins LPN Severity Noted Reaction Type Reactions Royce High 08/28/2017    Anaphylaxis Current Immunizations  Reviewed on 3/25/2018 Name Date Influenza Vaccine (Quad) PF 10/10/2017 Tdap 2/1/2018 Not reviewed this visit Vitals BP Height(growth percentile) Weight(growth percentile) LMP BMI OB Status 126/74 5' 7\" (1.702 m) 298 lb (135.2 kg) 08/03/2018 46.67 kg/m2 Recent pregnancy Smoking Status Never Smoker BMI and BSA Data Body Mass Index Body Surface Area  
 46.67 kg/m 2 2.53 m 2 Preferred Pharmacy Pharmacy Name Phone [id number] [place] [facility] [place] [address] [phone] Your Updated Medication List  
  
   
This list is accurate as of 8/17/18  8:58 AM.  Always use your most recent med list. AMBULATORY BREAST PUMP Use as directed  
  
 fexofenadine 180 mg tablet Commonly known as:  Belen Maurice Take  by mouth. norethindrone 0.35 mg Tab Commonly known as:  Bentley & Bentley Take 1 Tab by mouth daily. norethindrone-ethinyl estradiol 1 mg-20 mcg (21)/75 mg (7) Tab Commonly known as:  LOESTRIN FE 1/20 (28-DAY) Take 1 Tab by mouth daily. PRENATAL 1 + IRON PO Take  by mouth. ZANTAC 150 mg tablet Generic drug:  raNITIdine Take 150 mg by mouth two (2) times a day. Patient Instructions Well Visit, Ages 25 to 48: Care Instructions Your Care Instructions Physical exams can help you stay healthy. Your doctor has checked your overall health and may have suggested ways to take good care of yourself. He or she also may have recommended tests. At home, you can help prevent illness with healthy eating, regular exercise, and other steps. Follow-up care is a key part of your treatment and safety. Be sure to make and go to all appointments, and call your doctor if you are having problems. It's also a good idea to know your test results and keep a list of the medicines you take. How can you care for yourself at home? · Reach and stay at a healthy weight. This will lower your risk for many problems, such as obesity, diabetes, heart disease, and high blood pressure. · Get at least 30 minutes of physical activity on most days of the week. Walking is a good choice. You also may want to do other activities, such as running, swimming, cycling, or playing tennis or team sports. Discuss any changes in your exercise program with your doctor. · Do not smoke or allow others to smoke around you. If you need help quitting, talk to your doctor about stop-smoking programs and medicines. These can increase your chances of quitting for good. · Talk to your doctor about whether you have any risk factors for sexually transmitted infections (STIs). Having one sex partner (who does not have STIs and does not have sex with anyone else) is a good way to avoid these infections. · Use birth control if you do not want to have children at this time. Talk with your doctor about the choices available and what might be best for you. · Protect your skin from too much sun. When you're outdoors from 10 a.m. to 4 p.m., stay in the shade or cover up with clothing and a hat with a wide brim. Wear sunglasses that block UV rays.  Even when it's cloudy, put broad-spectrum sunscreen (SPF 30 or higher) on any exposed skin. · See a dentist one or two times a year for checkups and to have your teeth cleaned. · Wear a seat belt in the car. · Drink alcohol in moderation, if at all. That means no more than 2 drinks a day for men and 1 drink a day for women. Follow your doctor's advice about when to have certain tests. These tests can spot problems early. For everyone · Cholesterol. Have the fat (cholesterol) in your blood tested after age 21. Your doctor will tell you how often to have this done based on your age, family history, or other things that can increase your risk for heart disease. · Blood pressure. Have your blood pressure checked during a routine doctor visit. Your doctor will tell you how often to check your blood pressure based on your age, your blood pressure results, and other factors. · Vision. Talk with your doctor about how often to have a glaucoma test. 
· Diabetes. Ask your doctor whether you should have tests for diabetes. · Colon cancer. Have a test for colon cancer at age 48. You may have one of several tests. If you are younger than 48, you may need a test earlier if you have any risk factors. Risk factors include whether you already had a precancerous polyp removed from your colon or whether your parent, brother, sister, or child has had colon cancer. For women · Breast exam and mammogram. Talk to your doctor about when you should have a clinical breast exam and a mammogram. Medical experts differ on whether and how often women under 50 should have these tests. Your doctor can help you decide what is right for you. · Pap test and pelvic exam. Begin Pap tests at age 24. A Pap test is the best way to find cervical cancer. The test often is part of a pelvic exam. Ask how often to have this test. 
· Tests for sexually transmitted infections (STIs).  Ask whether you should have tests for STIs. You may be at risk if you have sex with more than one person, especially if your partners do not wear condoms. For men · Tests for sexually transmitted infections (STIs). Ask whether you should have tests for STIs. You may be at risk if you have sex with more than one person, especially if you do not wear a condom. · Testicular cancer exam. Ask your doctor whether you should check your testicles regularly. · Prostate exam. Talk to your doctor about whether you should have a blood test (called a PSA test) for prostate cancer. Experts differ on whether and when men should have this test. Some experts suggest it if you are older than 39 and are -American or have a father or brother who got prostate cancer when he was younger than 72. When should you call for help? Watch closely for changes in your health, and be sure to contact your doctor if you have any problems or symptoms that concern you. Where can you learn more? Go to http://chris-danette.info/. Enter P072 in the search box to learn more about \"Well Visit, Ages 25 to 48: Care Instructions. \" Current as of: May 16, 2017 Content Version: 11.7 © 3428-2157 Met[employer]. Care instructions adapted under license by [employer] (which disclaims liability or warranty for this information). If you have questions about a medical condition or this instruction, always ask your healthcare professional. [address] any warranty or liability for your use of this information. Introducing \[facility] [place] [facility]\"" & HEALTH SERVICES! Dear Luciana kirby: Thank you for requesting a [facility] account. Our records indicate that you already have an active [employer] account. You can access your account anytime at [email or web address] Did you know that you can access your hospital and ER discharge instructions at any time in [employer] [facility]?   You can also review all of your test results from your hospital stay or ER visit. Additional Information If you have questions, please visit the Frequently Asked Questions section of the [employer] website at [email or web address] Remember, [employer] is NOT to be used for urgent needs. For medical emergencies, dial 911. Now available from your iPhone and Android! Please provide this summary of care documentation to your next provider. Your primary care clinician is listed as PROVIDER UNKNOWN. If you have any questions after today's visit, please call [phone].

## 2018-08-17 NOTE — PATIENT INSTRUCTIONS

## 2018-08-17 NOTE — PROGRESS NOTES
[facility]  [email or web address]  [phone]    Wang Thurman MD, FACOG       Annual Gynecologic Exam:  WWE <40  Chief Complaint   Patient presents with    Well Woman         Edie Andrade is a 29 y.o.  [facility] female who presents for an annual well woman exam.  Patient's last menstrual period was 2018. .    With regard to the Gardisil vaccine, she is older than the FDA approved age to receive it. She does not report additional concerns today. AUB recently changed from minipill. Menstrual status:  Her periods are light. She does not report dysmenorrhea/painful menses. She does not report irregular bleeding. Sexual history and Contraception:  History   Sexual Activity    Sexual activity: Yes    Partners: Male    Birth control/ protection: Pill     She uses ocp  with sexual activity  She does not reports new sexual partner(s) in the last year. The patient does not request STD testing. We recommended testing per CDC guidelines and at patient request.     Preventive Medicine History:  Her most recent Pap smear result: normal was obtained in 2017  Her most recent HR HPV screen was Negative obtained in   She does not have a history of FELIX 2, 3 or cervical cancer. Past Medical History:   Diagnosis Date    Pap smear for cervical cancer screening 2017    Negative, HPV negative    Routine Papanicolaou smear     Negative per pt.       OB History    Para Term  AB Living   1 1 1   1   SAB TAB Ectopic Molar Multiple Live Births        1      # Outcome Date GA Lbr Antwan/2nd Weight Sex Delivery Anes PTL Lv   1 Term 18 38w0d  6 lb 4.5 oz (2.849 kg) F Vag-Spont EPI N HARDEEP        Past Surgical History:   Procedure Laterality Date    HX WISDOM TEETH EXTRACTION  2017     Family History   Problem Relation Age of Onset    Colon Cancer Maternal Grandfather     Diabetes Paternal Grandmother      Social History Social History    Marital status:      Spouse name: N/A    Number of children: N/A    Years of education: N/A     Occupational History    Not on file. Social History Main Topics    Smoking status: Never Smoker    Smokeless tobacco: Never Used      Comment: Never used vapor or e-cigs     Alcohol use No    Drug use: No    Sexual activity: Yes     Partners: Male     Birth control/ protection: Pill     Other Topics Concern    Not on file     Social History Narrative       Allergies   Allergen Reactions    [place] Anaphylaxis       Current Outpatient Prescriptions   Medication Sig    norethindrone-ethinyl estradiol (LOESTRIN FE 1/20, 28-DAY,) 1 mg-20 mcg (21)/75 mg (7) tab Take 1 Tab by mouth daily.  fexofenadine (ALLEGRA) 180 mg tablet Take  by mouth.  PRENATAL VITS W-CA,FE,FA,1 MG, (PRENATAL 1 + IRON PO) Take  by mouth. No current facility-administered medications for this visit.         Patient Active Problem List   Diagnosis Code    Prenatal care of primigravida, antepartum Z34.00    Oligohydramnios O41.00X0    Obesity, morbid ([facility] [address] E66.01       Review of Systems - History obtained from the patient  Constitutional: negative for weight loss, fever, night sweats  HEENT: negative for hearing loss, earache, congestion, snoring, sorethroat  CV: negative for chest pain, palpitations, edema  Resp: negative for cough, shortness of breath, wheezing  GI: negative for change in bowel habits, abdominal pain, black or bloody stools  : negative for frequency, dysuria, hematuria  GYN: see HPI  MSK: negative for back pain, joint pain, muscle pain  Breast: negative for breast lumps, nipple discharge, galactorrhea  Skin :negative for itching, rash, hives  Neuro: negative for dizziness, headache, confusion, weakness  Psych: negative for anxiety, depression, change in mood  Heme/lymph: negative for bleeding, bruising, pallor    Physical Exam  Visit Vitals    /74    Ht 5' 7\" (1.702 m)    Wt 298 lb (135.2 kg)    LMP 08/03/2018    BMI 46.67 kg/m2       Constitutional  · Appearance: well-nourished, well developed, alert, in no acute distress    HENT  · Head and Face: appears normal    Neck  · Inspection/Palpation: normal appearance, no masses or tenderness  · Lymph Nodes: no lymphadenopathy present  · Thyroid: gland size normal, nontender, no nodules or masses present on palpation    Chest  · Respiratory Effort: breathing unlabored  · Auscultation: normal breath sounds    Cardiovascular  · Heart:  · Auscultation: regular rate and rhythm without murmur    Breasts  · Inspection of Breasts: breasts symmetrical, no skin changes, no discharge present, nipple appearance normal, no skin retraction present  · Palpation of Breasts and Axillae: no masses present on palpation, no breast tenderness  · Axillary Lymph Nodes: no lymphadenopathy present    Gastrointestinal  · Abdominal Examination: abdomen non-tender to palpation, normal bowel sounds, no masses present  · Liver and spleen: no hepatomegaly present, spleen not palpable  · Hernias: no hernias identified    Genitourinary  · External Genitalia: normal appearance for age, no discharge present, no tenderness present, no inflammatory lesions present, no masses present  · Vagina: normal vaginal vault without central or paravaginal defects, no discharge present, no inflammatory lesions present, no masses present  · Bladder: non-tender to palpation  · Urethra: appears normal  · Cervix: normal   · Uterus: normal size, shape and consistency  · Adnexa: no adnexal tenderness present, no adnexal masses present  · Perineum: perineum within normal limits, no evidence of trauma, no rashes or skin lesions present  · Anus: anus within normal limits, no hemorrhoids present  · Inguinal Lymph Nodes: no lymphadenopathy present    Skin  · General Inspection: no rash, no lesions identified    Neurologic/Psychiatric  · Mental Status:  · Orientation: grossly oriented to person, place and time  · Mood and Affect: mood normal, affect appropriate    Assessment:  29 y.o.  for well woman exam  Encounter Diagnoses   Name Primary?  Encounter for gynecological examination (general) (routine) without abnormal findings Yes    Abnormal uterine bleeding        Plan:  The patient was counseled about diet, exercise, healthy lifestyle  We discussed self breast exam  We discussed safer sex practices, condom use and risk factors for sexually transmitted diseases. We discussed current pap smear and HR HPV testing guidelines. We recommend follow up one year for routine annual gynecologic exam or sooner prn  We recommend routine follow up with her primary care doctor for management of chronic medical problems and non-gynecologic concerns  Handouts were given to the patient  We discussed calcium/vitamin D/weight bearing exercise and osteoporosis prevention  Discussed risks, benefits and alternatives of OCP/nuvaring/patch: including but not limited to dvt/pe/mi/cva/ca/gi risks. She will notify MD if AUB persists on OCP.   Folllow up:  [x] return for annual well woman exam in one year or sooner if she is having problems  [] follow up and ultrasound  [] 6 months  [] 3 months  [] 6 weeks   [] 1 month    Orders Placed This Encounter    norethindrone-ethinyl estradiol (LOESTRIN FE , 28-DAY,) 1 mg-20 mcg (21)/75 mg (7) tab       No results found for any visits on 18.-

## 2018-12-28 NOTE — PROGRESS NOTES
[address] [place] [address]  Phone: [phone]  Fax: [phone]    Discharge Summary  2-15    Patient name: Jessica Robledo  : 1985  Provider#: [id number]  Referral source: Beck Sosa MD      Medical/Treatment Diagnosis: Other specified pregnancy related conditions, unspecified trimester [O26.899]  Pelvic and perineal pain [R10.2]     Prior Hospitalization: see medical history     Comorbidities: See Plan of Care  Prior Level of Function:See Plan of Care  Medications: Verified on Patient Summary List    Start of Care: 19      Onset Date: 2 weeks prior   Visits from Start of Care: 2     Missed Visits: 0  Reporting Period : [facility] to 18      ASSESSMENT/SUMMARY OF CARE:  Pt referred to PT for evaluation and treatment of groin and abdominal pain in pregnancy. Treatment consisted of manual therapy, therapeutic exercise, therapeutic activity, patient education, posture and body mechanics training, modalities, home exercise program development and progression. She responded well to PT efforts, reported 80% improvement at second visit. She is discharged with excellent results, all goals met. Short Term Goals: To be accomplished in 6 weeks:  Patient will be independent with a progressive home exercise program   MET  Patient will be independent with lumbar and abdominal protection techniques  MET  Patient will have no pain with palpation to abdominal wall MET  Patient will transition between sit and stand with no pain MET     Long Term Goals: To be accomplished in 12 weeks:  Patient will report 50% decrease in perineal/vaginal discomfort MET  Patient will have no pain with work activities.  MET        RECOMMENDATIONS:  [x]Discontinue therapy: [x]Patient has reached or is progressing toward set goals      []Patient is non-compliant or has abdicated      []Due to lack of appreciable progress towards set goals      []Other    Pipe Shay PT, MSPT   12/28/2018 10:46 AM

## 2019-08-21 ENCOUNTER — OFFICE VISIT (OUTPATIENT)
Dept: OBGYN CLINIC | Age: 34
End: 2019-08-21

## 2019-08-21 VITALS
WEIGHT: 293 LBS | HEIGHT: 67 IN | DIASTOLIC BLOOD PRESSURE: 78 MMHG | SYSTOLIC BLOOD PRESSURE: 130 MMHG | BODY MASS INDEX: 45.99 KG/M2

## 2019-08-21 DIAGNOSIS — N64.52 BREAST DISCHARGE: ICD-10-CM

## 2019-08-21 DIAGNOSIS — N63.10 LUMP OF RIGHT BREAST: Primary | ICD-10-CM

## 2019-08-21 NOTE — PROGRESS NOTES
[facility]  [email or web address]    Daylin Patel MD, 1958 [facility]       OB/GYN Problem visit    Chief Complaint:   Chief Complaint   Patient presents with    Breast Mass       History of Present Illness: This is a new problem being evaluated by this provider. The patient is a 30 y.o.  female who reports having breast mass for 1 week, more tenderness on palpation. Some nipple drainage without stimulation, stopped breastfeeding 2018. She reports the symptoms are is unchanged. Aggravating factors include none. Alleviating factors include none. +b/l nipple discharge, white, in bra and when in shower. NO BF x 1 year ()    +more migraine HA    She reports no prior history of breast cancer, biopsy or abnormal mammograms. She is not breast feeding. She does not have other concerns. Last Mammogram Results:  No results found for this or any previous visit. LMP: Patient's last menstrual period was 2019. PFSH:  Past Medical History:   Diagnosis Date    Pap smear for cervical cancer screening 2017    Negative, HPV negative    Routine Papanicolaou smear     Negative per pt. Past Surgical History:   Procedure Laterality Date    HX WISDOM TEETH EXTRACTION  2017     Family History   Problem Relation Age of Onset    Colon Cancer Maternal Grandfather     Diabetes Paternal Grandmother      Social History     Tobacco Use    Smoking status: Never Smoker    Smokeless tobacco: Never Used    Tobacco comment: Never used vapor or e-cigs    Substance Use Topics    Alcohol use: No    Drug use: No     Allergies   Allergen Reactions    [place] Anaphylaxis     Current Outpatient Medications   Medication Sig    norethindrone-ethinyl estradiol (LOESTRIN FE , 28-DAY,) 1 mg-20 mcg (21)/75 mg (7) tab Take 1 Tab by mouth daily.  fexofenadine (ALLEGRA) 180 mg tablet Take  by mouth.     PRENATAL VITS W-CA,FE,FA,1 MG, (PRENATAL 1 + IRON PO) Take by mouth. No current facility-administered medications for this visit. Review of Systems:  History obtained from the patient  Constitutional: negative for fevers, chills and weight loss  ENT ROS: negative for - hearing change, oral lesions or visual changes  Respiratory: negative for cough, wheezing or dyspnea on exertion  Cardiovascular: negative for chest pain, irregular heart beats, exertional chest pressure/discomfort  Gastrointestinal: negative for dysphagia, nausea and vomiting  Genito-Urinary ROS: no dysuria, trouble voiding, or hematuria  Inteument/breast: see HPI  Musculoskeletal:negative for stiff joints, neck pain and muscle weakness  Endocrine ROS: negative for - breast changes, galactorrhea or temperature intolerance  Hematological and Lymphatic ROS: negative for - blood clots, bruising or swollen lymph nodes    Physical Exam:  Visit Vitals  /78 (BP 1 Location: Left arm, BP Patient Position: Sitting)   Ht 5' 7\" (1.702 m)   Wt 294 lb (133.4 kg)   Breastfeeding? No   BMI 46.05 kg/m²       GENERAL: alert, well appearing, and in no distress  HEAD: normocephalic, atraumatic. NECK:  supple, no significant adenopathy, no palpable masses  PULM: clear to auscultation, no wheezes, rales or rhonchi, symmetric air entry   COR: normal rate and regular rhythm, S1 and S2 normal   BACK: no cvat  ABDOMEN: soft, nontender, nondistended, no masses or organomegaly   BREAST:   Right breast appear normal, no suspicious masses, no skin or nipple changes or axillary nodes. Inc density/fibrocystic changes right breast see image  No images are attached to the encounter. Left breast appear normal, no suspicious masses, no skin or nipple changes or axillary nodes  NEURO: alert, oriented, normal speech  SKIN: no breast skin changes    Assessment:  Encounter Diagnoses   Name Primary?     Lump of right breast Yes    Breast discharge     Comment: b/l         Plan:  The patient is advised that she should contact the office if she does not note improvement or if symptoms recur. She should contact our office with any questions or concerns. She could keep her routine annual exam appointment. We reviewed self breast exams with the patient. We recommend follow up with PCP for non-gynecologic complaints and chronic medical problems.     Breast referral  Labs  Avoid breast/nipple stimulation  Disc sbe, suspect cystic/benign changes but will refer    Orders Placed This Encounter    TSH REFLEX TO T4    PROLACTIN    REFERRAL TO BREAST SURGERY

## 2019-08-21 NOTE — PATIENT INSTRUCTIONS
Breast Lumps: Care Instructions  Your Care Instructions  Breast lumps are common, especially in women between ages 27 and 48. Many women's breasts feel lumpy and tender before their menstrual period. Women also may have lumps when they are breastfeeding. Breast lumps may go away after menopause. All new breast lumps in women after menopause should be checked by a doctor. Although lumps may be normal for you, it is important to have your doctor check any lump or thickness that is not like the rest of your breast to make sure it is not cancer. A lump may be larger, harder, or different from the rest of your breast tissue. Follow-up care is a key part of your treatment and safety. Be sure to make and go to all appointments, and call your doctor if you are having problems. It's also a good idea to know your test results and keep a list of the medicines you take. How can you care for yourself at home? · Make an appointment to have a mammogram and other follow-up visits as recommended by your doctor. When should you call for help? Watch closely for changes in your health, and be sure to contact your doctor if:    · You do not get better as expected.     · Your breast has changed.     · You have pain in your breast.     · You have a discharge from your nipple.     · A breast lump changes or does not go away. Where can you learn more? Go to http://chris-danette.info/. Enter Y864 in the search box to learn more about \"Breast Lumps: Care Instructions. \"  Current as of: February 19, 2019  Content Version: 12.1  © 9683-3773 Healthwise, [employer]orporated. Care instructions adapted under license by [employer] (which disclaims liability or warranty for this information). If you have questions about a medical condition or this instruction, always ask your healthcare professional. [address] any warranty or liability for your use of this information.

## 2019-08-22 LAB
PROLACTIN SERPL-MCNC: 25.2 NG/ML (ref 4.8–23.3)
TSH SERPL DL<=0.005 MIU/L-ACNC: 2.41 UIU/ML (ref 0.45–4.5)

## 2019-08-23 NOTE — PROGRESS NOTES
Abnormal results. Please notify pt.  rec endocrine consult as well for; elevated prl and nipple discharge  Tickle for endo fu or prl fu  If can't get into see endo for a few weeks, rec repeat fasting prl 4 weeks.

## 2019-08-26 DIAGNOSIS — N64.52 BREAST DISCHARGE: Primary | ICD-10-CM

## 2019-08-26 DIAGNOSIS — R79.89 ELEVATED PROLACTIN LEVEL: ICD-10-CM

## 2019-08-26 NOTE — PROGRESS NOTES
Referral placed w/ [facility]  Pt informed (and has read her Mychart msg) and given phone number to Dina.

## 2019-08-27 ENCOUNTER — OFFICE VISIT (OUTPATIENT)
Dept: SURGERY | Age: 34
End: 2019-08-27

## 2019-08-27 VITALS
WEIGHT: 290 LBS | HEART RATE: 53 BPM | HEIGHT: 67 IN | DIASTOLIC BLOOD PRESSURE: 75 MMHG | SYSTOLIC BLOOD PRESSURE: 118 MMHG | BODY MASS INDEX: 45.52 KG/M2

## 2019-08-27 DIAGNOSIS — N63.10 BREAST MASS, RIGHT: Primary | ICD-10-CM

## 2019-08-27 RX ORDER — PROPRANOLOL HYDROCHLORIDE 80 MG/1
CAPSULE, EXTENDED RELEASE ORAL DAILY
COMMUNITY
End: 2019-12-17

## 2019-08-27 RX ORDER — SUMATRIPTAN 50 MG/1
50 TABLET, FILM COATED ORAL
COMMUNITY
End: 2021-10-13

## 2019-08-27 NOTE — PATIENT INSTRUCTIONS
Breast Lumps: Care Instructions  Your Care Instructions  Breast lumps are common, especially in women between ages 27 and 48. Many women's breasts feel lumpy and tender before their menstrual period. Women also may have lumps when they are breastfeeding. Breast lumps may go away after menopause. All new breast lumps in women after menopause should be checked by a doctor. Although lumps may be normal for you, it is important to have your doctor check any lump or thickness that is not like the rest of your breast to make sure it is not cancer. A lump may be larger, harder, or different from the rest of your breast tissue. Follow-up care is a key part of your treatment and safety. Be sure to make and go to all appointments, and call your doctor if you are having problems. It's also a good idea to know your test results and keep a list of the medicines you take. How can you care for yourself at home? · Make an appointment to have a mammogram and other follow-up visits as recommended by your doctor. When should you call for help? Watch closely for changes in your health, and be sure to contact your doctor if:    · You do not get better as expected.     · Your breast has changed.     · You have pain in your breast.     · You have a discharge from your nipple.     · A breast lump changes or does not go away. Where can you learn more? Go to http://chris-danette.info/. Enter X642 in the search box to learn more about \"Breast Lumps: Care Instructions. \"  Current as of: February 19, 2019  Content Version: 12.1  © 9826-9339 Healthwise, Incorporated. Care instructions adapted under license by [employer] (which disclaims liability or warranty for this information). If you have questions about a medical condition or this instruction, always ask your healthcare professional. [address] any warranty or liability for your use of this information.

## 2019-08-27 NOTE — LETTER
8/27/19 Patient: Mack Domínguez YOB: 1985 Date of Visit: 8/27/2019 Uli Montez MD 
[address] [place] [address] [place] [address] [phone] VIA In Basket Dear Uli Montez MD, Thank you for referring Ms. Mack Domínguez to [address] for evaluation. My notes for this consultation are attached. If you have questions, please do not hesitate to call me. I look forward to following your patient along with you.  
 
 
Sincerely, 
 
Kindra Beltran MD

## 2019-08-27 NOTE — PROGRESS NOTES
HISTORY OF PRESENT ILLNESS  Mack Domínguez is a 30 y.o. female. HPI NEW patient consult referred by  Dr. Uli Montez for RIGHT breast lump, which she noticed about 2 weeks ago. She also has intermittent white nipple discharge in BILATERAL breasts and stopped breastfeeding her 3year old daughter Mihaela Jimenez) a year ago. OB History    Para Term  AB Living   1 1 1 0 0 1   SAB TAB Ectopic Molar Multiple Live Births   0 0 0 0 0 1   Obstetric Comments   Menarche 8, LMP 21, # of children 1, age of 4st delivery 29, Hysterectomy/oophorectomy no/no, Breast bx no, history of breast feeding yes, BCP yes, Hormone therapy no     Family History:  No family history of breast or ovarian cancer. No breast imaging    Review of Systems   Constitutional: Negative. HENT: Negative. Eyes: Negative. Respiratory: Negative. Cardiovascular: Negative. Gastrointestinal: Negative. Genitourinary: Negative. Musculoskeletal: Negative. Skin: Negative. Neurological: Positive for dizziness, tremors and headaches. Endo/Heme/Allergies: Negative. Psychiatric/Behavioral: The patient is nervous/anxious. All other systems reviewed and are negative. Physical Exam   Pulmonary/Chest: Right breast exhibits mass (2 cm elongated smooth oval mass UIQ.  no skin dimple). Right breast exhibits no inverted nipple, no nipple discharge, no skin change and no tenderness. Left breast exhibits no inverted nipple, no mass, no nipple discharge, no skin change and no tenderness. Breasts are symmetrical.       Lymphadenopathy:     She has no axillary adenopathy. BREAST ULTRASOUND  Indication: RIGHT breast mass UIQ  Technique: The RIGHT breast was scanned using a high-frequency linear-array near-field transducer  Findings: No abnormal mass, lesion, or shadowing noted. No cysts.    Impression: fatty lobules correspond with palpable mass   Disposition: No worrisome finding on ultrasound  ASSESSMENT and PLAN ICD-10-CM ICD-9-CM    1. Breast mass, right N63.10 611.72      RIGHT breast mass is a fatty lobule  Discussed self breast exam and explained what a noncancerous mass feels like (smooth, round and mobile) compared with a cancerous mass (hard, fixed, bumpy and often associated with a skin dimple). PRN follow up  Pt may have a mammogram if she feels a suspicious mass.

## 2019-09-09 NOTE — PATIENT INSTRUCTIONS
Well Visit, Ages 25 to 48: Care Instructions  Your Care Instructions    Physical exams can help you stay healthy. Your doctor has checked your overall health and may have suggested ways to take good care of yourself. He or she also may have recommended tests. At home, you can help prevent illness with healthy eating, regular exercise, and other steps. Follow-up care is a key part of your treatment and safety. Be sure to make and go to all appointments, and call your doctor if you are having problems. It's also a good idea to know your test results and keep a list of the medicines you take. How can you care for yourself at home? · Reach and stay at a healthy weight. This will lower your risk for many problems, such as obesity, diabetes, heart disease, and high blood pressure. · Get at least 30 minutes of physical activity on most days of the week. Walking is a good choice. You also may want to do other activities, such as running, swimming, cycling, or playing tennis or team sports. Discuss any changes in your exercise program with your doctor. · Do not smoke or allow others to smoke around you. If you need help quitting, talk to your doctor about stop-smoking programs and medicines. These can increase your chances of quitting for good. · Talk to your doctor about whether you have any risk factors for sexually transmitted infections (STIs). Having one sex partner (who does not have STIs and does not have sex with anyone else) is a good way to avoid these infections. · Use birth control if you do not want to have children at this time. Talk with your doctor about the choices available and what might be best for you. · Protect your skin from too much sun. When you're outdoors from 10 a.m. to 4 p.m., stay in the shade or cover up with clothing and a hat with a wide brim. Wear sunglasses that block UV rays. Even when it's cloudy, put broad-spectrum sunscreen (SPF 30 or higher) on any exposed skin.   · See a dentist one or two times a year for checkups and to have your teeth cleaned. · Wear a seat belt in the car. Follow your doctor's advice about when to have certain tests. These tests can spot problems early. For everyone  · Cholesterol. Have the fat (cholesterol) in your blood tested after age 21. Your doctor will tell you how often to have this done based on your age, family history, or other things that can increase your risk for heart disease. · Blood pressure. Have your blood pressure checked during a routine doctor visit. Your doctor will tell you how often to check your blood pressure based on your age, your blood pressure results, and other factors. · Vision. Talk with your doctor about how often to have a glaucoma test.  · Diabetes. Ask your doctor whether you should have tests for diabetes. · Colon cancer. Your risk for colorectal cancer gets higher as you get older. Some experts say that adults should start regular screening at age 48 and stop at age 76. Others say to start before age 48 or continue after age 76. Talk with your doctor about your risk and when to start and stop screening. For women  · Breast exam and mammogram. Talk to your doctor about when you should have a clinical breast exam and a mammogram. Medical experts differ on whether and how often women under 50 should have these tests. Your doctor can help you decide what is right for you. · Cervical cancer screening test and pelvic exam. Begin with a Pap test at age 24. The test often is part of a pelvic exam. Starting at age 27, you may choose to have a Pap test, an HPV test, or both tests at the same time (called co-testing). Talk with your doctor about how often to have testing. · Tests for sexually transmitted infections (STIs). Ask whether you should have tests for STIs. You may be at risk if you have sex with more than one person, especially if your partners do not wear condoms.   For men  · Tests for sexually transmitted infections (STIs). Ask whether you should have tests for STIs. You may be at risk if you have sex with more than one person, especially if you do not wear a condom. · Testicular cancer exam. Ask your doctor whether you should check your testicles regularly. · Prostate exam. Talk to your doctor about whether you should have a blood test (called a PSA test) for prostate cancer. Experts differ on whether and when men should have this test. Some experts suggest it if you are older than 39 and are -American or have a father or brother who got prostate cancer when he was younger than 72. When should you call for help? Watch closely for changes in your health, and be sure to contact your doctor if you have any problems or symptoms that concern you. Where can you learn more? Go to http://chris-danette.info/. Enter P072 in the search box to learn more about \"Well Visit, Ages 25 to 48: Care Instructions. \"  Current as of: December 13, 2018  Content Version: 12.1  © 3721-1692 Healthwise, Incorporated. Care instructions adapted under license by [employer] (which disclaims liability or warranty for this information). If you have questions about a medical condition or this instruction, always ask your healthcare professional. [place] [address] any warranty or liability for your use of this information.

## 2019-09-11 ENCOUNTER — OFFICE VISIT (OUTPATIENT)
Dept: OBGYN CLINIC | Age: 34
End: 2019-09-11

## 2019-09-11 VITALS
DIASTOLIC BLOOD PRESSURE: 72 MMHG | SYSTOLIC BLOOD PRESSURE: 128 MMHG | BODY MASS INDEX: 45.04 KG/M2 | WEIGHT: 287 LBS | HEIGHT: 67 IN

## 2019-09-11 DIAGNOSIS — R39.15 URGENCY OF URINATION: ICD-10-CM

## 2019-09-11 DIAGNOSIS — Z01.411 ENCOUNTER FOR GYNECOLOGICAL EXAMINATION (GENERAL) (ROUTINE) WITH ABNORMAL FINDINGS: Primary | ICD-10-CM

## 2019-09-11 DIAGNOSIS — G43.809 OTHER MIGRAINE WITHOUT STATUS MIGRAINOSUS, NOT INTRACTABLE: ICD-10-CM

## 2019-09-11 RX ORDER — NORETHINDRONE ACETATE AND ETHINYL ESTRADIOL 1MG-20(21)
1 KIT ORAL DAILY
Qty: 3 PACKAGE | Refills: 0 | Status: SHIPPED | OUTPATIENT
Start: 2019-09-11 | End: 2019-12-17

## 2019-09-11 NOTE — PROGRESS NOTES
[facility]  [email or web address]  [phone]    Cherelle Qiu MD, FACOG       Annual Gynecologic Exam:  WWE <40  Chief Complaint   Patient presents with    Well Woman         Chelly Zaman is a 30 y.o.  [facility] female who presents for an annual well woman exam.  Patient's last menstrual period was 2019 (exact date). .    With regard to the Gardisil vaccine, she is older than the FDA approved age to receive it. She does report additional concerns today. Stress incontinence and urinary urgency for 1 yr. Menstrual status:  Her periods are heavy. She does report dysmenorrhea/painful menses. She does not report irregular bleeding. Sexual history and Contraception:  Social History     Substance and Sexual Activity   Sexual Activity Yes    Partners: Male    Birth control/protection: Pill     She never use condoms with sexual activity  She does not reports new sexual partner(s) in the last year. The patient does not request STD testing. We recommended testing per CDC guidelines and at patient request.     Preventive Medicine History:  Her most recent Pap smear result: normal was obtained in 2017  Her most recent HR HPV screen was Negative obtained in   She does not have a history of FELIX 2, 3 or cervical cancer. Past Medical History:   Diagnosis Date    Familial tremor     right hand    Headache     migraine, +aura/tunnel vision    Pap smear for cervical cancer screening 2017    Negative, HPV negative    Routine Papanicolaou smear     Negative per pt.       OB History    Para Term  AB Living   1 1 1     1   SAB TAB Ectopic Molar Multiple Live Births             1      # Outcome Date GA Lbr Antwan/2nd Weight Sex Delivery Anes PTL Lv   1 Term 18 38w0d  6 lb 4.5 oz (2.849 kg) F Vag-Spont EPI N HARDEEP      Obstetric Comments   Menarche 8, LMP 21, # of children 1, age of 1st delivery 29, Hysterectomy/oophorectomy no/no, Breast bx no, history of breast feeding yes, BCP yes, Hormone therapy no     Past Surgical History:   Procedure Laterality Date    HX WISDOM TEETH EXTRACTION  05/2017     Family History   Problem Relation Age of Onset    Colon Cancer Maternal Grandfather     Diabetes Paternal Grandmother      Social History     Socioeconomic History    Marital status:      Spouse name: Not on file    Number of children: Not on file    Years of education: Not on file    Highest education level: Not on file   Occupational History    Not on file   Social Needs    Financial resource strain: Not on file    Food insecurity:     Worry: Not on file     Inability: Not on file    Transportation needs:     Medical: Not on file     Non-medical: Not on file   Tobacco Use    Smoking status: Never Smoker    Smokeless tobacco: Never Used    Tobacco comment: Never used vapor or e-cigs    Substance and Sexual Activity    Alcohol use: No    Drug use: No    Sexual activity: Yes     Partners: Male     Birth control/protection: Pill   Lifestyle    Physical activity:     Days per week: Not on file     Minutes per session: Not on file    Stress: Not on file   Relationships    Social connections:     Talks on phone: Not on file     Gets together: Not on file     Attends Temple service: Not on file     Active member of club or organization: Not on file     Attends meetings of clubs or organizations: Not on file     Relationship status: Not on file    Intimate partner violence:     Fear of current or ex partner: Not on file     Emotionally abused: Not on file     Physically abused: Not on file     Forced sexual activity: Not on file   Other Topics Concern    Not on file   Social History Narrative    Not on file       Allergies   Allergen Reactions    [place] Anaphylaxis    Other Plant, Animal, Environmental Other (comments)     Dust, mold, mildew       Current Outpatient Medications Medication Sig    norethindrone-ethinyl estradiol (LOESTRIN FE 1/20, 28-DAY,) 1 mg-20 mcg (21)/75 mg (7) tab Take 1 Tab by mouth daily.  propranolol LA (INDERAL LA) 80 mg SR capsule Take  by mouth daily.  SUMAtriptan (IMITREX) 50 mg tablet Take 50 mg by mouth once as needed for Migraine. No current facility-administered medications for this visit. Patient Active Problem List   Diagnosis Code    Prenatal care of primigravida, antepartum Z34.00    Oligohydramnios O41.00X0    Obesity, morbid ([facility] [address] E66.01       Review of Systems - History obtained from the patient  Constitutional: negative for weight loss, fever, night sweats  HEENT: negative for hearing loss, earache, congestion, snoring, sorethroat  CV: negative for chest pain, palpitations, edema  Resp: negative for cough, shortness of breath, wheezing  GI: negative for change in bowel habits, abdominal pain, black or bloody stools  : negative for frequency, dysuria, hematuria. + stress incontinence  + urinary urgency  GYN: see HPI  MSK: negative for back pain, joint pain, muscle pain  Breast: negative for breast lumps, nipple discharge, galactorrhea  Skin :negative for itching, rash, hives  Neuro: negative for dizziness, headache, confusion, weakness  Psych: negative for anxiety, depression, change in mood  Heme/lymph: negative for bleeding, bruising, pallor      (WWE continued)     Physical Exam  Visit Vitals  /72   Ht 5' 7\" (1.702 m)   Wt 287 lb (130.2 kg)   LMP 08/30/2019 (Exact Date)   Breastfeeding?  No   BMI 44.95 kg/m²       Constitutional  · Appearance: well-nourished, well developed, alert, in no acute distress    HENT  · Head and Face: appears normal    Neck  · Inspection/Palpation: normal appearance, no masses or tenderness  · Lymph Nodes: no lymphadenopathy present  · Thyroid: gland size normal, nontender, no nodules or masses present on palpation    Chest  · Respiratory Effort: breathing unlabored  · Auscultation: normal breath sounds    Cardiovascular  · Heart:  · Auscultation: regular rate and rhythm without murmur    Breasts  · Inspection of Breasts: breasts symmetrical, no skin changes, no discharge present, nipple appearance normal, no skin retraction present  · Palpation of Breasts and Axillae: no masses present on palpation, no breast tenderness  · Axillary Lymph Nodes: no lymphadenopathy present    Gastrointestinal  · Abdominal Examination: abdomen non-tender to palpation, normal bowel sounds, no masses present  · Liver and spleen: no hepatomegaly present, spleen not palpable  · Hernias: no hernias identified    Genitourinary  · External Genitalia: normal appearance for age, no discharge present, no tenderness present, no inflammatory lesions present, no masses present  · Vagina: normal vaginal vault with mild posterior> anterior laxity defects, no discharge present, no inflammatory lesions present, no masses present  · Bladder: non-tender to palpation  · Urethra: appears normal  · Cervix: normal   · Uterus: normal size, shape and consistency  · Adnexa: no adnexal tenderness present, no adnexal masses present  · Perineum: perineum within normal limits, no evidence of trauma, no rashes or skin lesions present  · Anus: anus within normal limits, no hemorrhoids present  · Inguinal Lymph Nodes: no lymphadenopathy present    Skin  · General Inspection: no rash, no lesions identified    Neurologic/Psychiatric  · Mental Status:  · Orientation: grossly oriented to person, place and time  · Mood and Affect: mood normal, affect appropriate    Assessment:  30 y.o.  for well woman exam  Encounter Diagnoses   Name Primary?     Urgency of urination     Encounter for gynecological examination (general) (routine) with abnormal findings Yes    Other migraine without status migrainosus, not intractable        Plan:  The patient was counseled about diet, exercise, healthy lifestyle  We discussed self breast exam  We discussed safer sex practices, condom use and risk factors for sexually transmitted diseases. We discussed current pap smear and HR HPV testing guidelines. We recommend follow up one year for routine annual gynecologic exam or sooner prn  We recommend routine follow up with her primary care doctor for management of chronic medical problems and non-gynecologic concerns  Handouts were given to the patient  We discussed calcium/vitamin D/weight bearing exercise and osteoporosis prevention  Bladder diet h/o, consider urology fu  Ur cx, avoid strain, weight loss  Pelvic floor exercises  Rec against OCP with h/o migraine HA with aura (previous records just said [facility]:')  We discussed progesterone only and non hormonal options for contraception including but not limited to condoms, IUDs, Nexplanon, and depo provera. Considering nexplanon h/o  Pt wants to continue ocp for now, aware of increase CV risk and stroke risk      Folllow up:  [x] return for annual well woman exam in one year or sooner if she is having problems  [] follow up and ultrasound  [] 6 months  [] 3 months  [] 6 weeks   [] 1 month    Orders Placed This Encounter    norethindrone-ethinyl estradiol (LOESTRIN FE 1/20, 28-DAY,) 1 mg-20 mcg (21)/75 mg (7) tab       No results found for any visits on 09/11/19.

## 2019-09-13 LAB — BACTERIA UR CULT: NORMAL

## 2019-09-26 NOTE — PROGRESS NOTES
Patient aware of results and MD recommendations by [facility] e-mail. Patient Result Comments     Viewed by Shazia Syed on 9/23/2019 12:59 PM   Written by Guillermina Swanson MD on 9/13/2019  5:35 PM   Your urine culture test results are normal.   There is no evidence of a urinary tract infection.

## 2019-09-27 NOTE — PROGRESS NOTES
[address] [facility]  [email or web address]  [phone]    Loida Adams MD, FACOG           [facility]  OFFICE PROCEDURE PROGRESS NOTE    Chart reviewed for the following:   IShanice MD, have reviewed the History, Physical and updated the Allergic reactions for [place] performed immediately prior to start of procedure:   Yesenia Kahn MD, have performed the following reviews on Elizabeth Booker prior to the start of the procedure:            * Patient was identified by name and date of birth   * Agreement on procedure being performed was verified  * Risks and Benefits explained to the patient  * Procedure site verified and marked as necessary  * Patient was positioned for comfort  * Consent was signed and verified     Time: 8:29 am    Date of procedure: 2019    Procedure performed by: Shanice Perez MD    Provider assisted by:   Britany Worthy LPN    Patient assisted by: self    How tolerated by patient: tolerated the procedure well with no complications    Post Procedural Pain Scale: 0 - No Hurt    Comments: none      Procedure note: Nexplanon insertion    Elizabeth Booker is a ,  30 y.o. female [facility] whose Patient's last menstrual period was 2019 (exact date). was on 2019 presents for office insertion of an [address] sub-dermal contraceptive implant. She is on OCPs, endorses last intercourse was 1week ago without condoms. In office today, UPT was negative. She has had an opportunity to read the [address] \"Patient Labeling and Consent Form\". We counseled the patient about the insertion and removal procedures as well as potential side-effects, benefits and risks. She state she had no further questions and signed the consent form. She has been using OCP (Oral Contraceptive Pills) to the present time. She confirmed that she has no allergies to Betadine or Xylocaine.  She reclined on the examination table in the supine position with her left arm flexed at the elbow and externally rotated. The insertion site was identified and marked approximately 6-8 cm proximal to the elbow crease at the inner side of the upper arm in the standard fashion. A second erick was placed 6-8 cm above the first erick. The insertion site was cleansed with Betadine antiseptic. Approximately 3 ml of 1% plain xylocaine were injected just under the skin along the planned insertion canal. The NEXPLANON sterile applicator was carefully removed from its blister pack and kept sterile. I removed the needle cap. I visually verified the presence of NEXPLANON inside the needle tip. The skin at the insertion site was then stretched by my thumb and index finger. I then inserted the needle tip through the skin at the appropriate angle to the skin surface, just until the skin has been penetrated. The needle was gently inserted to its full length. The slider was then pushed all the way and then released. I then removed the needle and palpated the implant in the appropriate location. The patient also palpated the implant in place. Both the patient and I were able to confirm the presence of the Reginia Sciara in its subdermal location by palpation. A small adhesive bandage over the insertion site then wrapped a pressure bandage with sterile gauze. The patient User Card and Patient Chart Label were filled in. She was given the User Card for her records after explaining it to her in detail. I stressed to her that she must have the Reginia Sciara removed before three years from today's date. She was then given the Personal Calendar (Bleeding Diary) with instructions in it's use. The patient received Nexplanon lot number [id number]. The Nexplanon did not come from a speciality pharmacy.     We discussed typical bleeding patterns and side effects with the Nexplanon  We recommend bleeding/symptom calendar and follow three months to evaluate. We recommended back up contraception x 2 weeks after insertion.

## 2019-09-27 NOTE — PATIENT INSTRUCTIONS
Implant for Birth Control: Care Instructions  Your Care Instructions    The implant is used to prevent pregnancy. It's a thin joy about the size of a matchstick that is inserted under the skin (subdermal) on the inside of your arm. The implant prevents pregnancy for 3 years. After it is put in, you don't have to do anything else to prevent pregnancy. Follow-up care is a key part of your treatment and safety. Be sure to make and go to all appointments, and call your doctor if you are having problems. It's also a good idea to know your test results and keep a list of the medicines you take. How can you care for yourself at home? How do you use the subdermal implant? · The implant is put in and taken out by your doctor or another trained health professional. This is done in your doctor's office. It only takes a few minutes. · Ask your doctor if you need to use backup birth control, such as a condom. And ask if (and how long) you should avoid intercourse after you get the implant. You may need to do this, depending on where you are in your cycle. What else do you need to know? · The implant has side effects. ? You may have changes in your period. Your period may stop. You may also have spotting or bleeding between periods. ? You may have mood changes, less interest in sex, or weight gain. · The implant can stay in place for up to 3 years to prevent pregnancy. But your doctor may talk to you about leaving it in for longer. ? If you don't replace the implant and don't use another form of birth control, you could get pregnant. ? If you have the implant removed, you'll have to find another method of birth control. If you don't, you may get pregnant. ? Even if you are planning to get pregnant, you have to have the implant removed. · Check with your doctor before you use any other medicines. This includes over-the-counter medicines, vitamins, herbal products, and supplements.  Birth control hormones may not work as well to prevent pregnancy when combined with other medicines. · The implant doesn't protect against sexually transmitted infections (STIs), such as herpes or HIV/AIDS. If you're not sure if your sex partner might have an STI, use a condom to protect against infection. When should you call for help? Watch closely for changes in your health, and be sure to contact your doctor if you have any problems. Where can you learn more? Go to http://chris-danette.info/. Enter T812 in the search box to learn more about \"Implant for Birth Control: Care Instructions. \"  Current as of: May 29, 2019  Content Version: 12.2  © 7790-4650 [employer], [employer]. Care instructions adapted under license by [employer] (which disclaims liability or warranty for this information). If you have questions about a medical condition or this instruction, always ask your healthcare professional. [address] any warranty or liability for your use of this information.

## 2019-09-30 ENCOUNTER — PATIENT MESSAGE (OUTPATIENT)
Dept: OBGYN CLINIC | Age: 34
End: 2019-09-30

## 2019-09-30 ENCOUNTER — OFFICE VISIT (OUTPATIENT)
Dept: OBGYN CLINIC | Age: 34
End: 2019-09-30

## 2019-09-30 VITALS
HEIGHT: 67 IN | DIASTOLIC BLOOD PRESSURE: 72 MMHG | BODY MASS INDEX: 44.98 KG/M2 | SYSTOLIC BLOOD PRESSURE: 126 MMHG | WEIGHT: 286.6 LBS

## 2019-09-30 DIAGNOSIS — Z30.017 IMPLANON INSERTION: ICD-10-CM

## 2019-09-30 DIAGNOSIS — Z30.017 INSERTION OF NEXPLANON: Primary | ICD-10-CM

## 2019-09-30 LAB
HCG URINE, QL. (POC): NEGATIVE
VALID INTERNAL CONTROL?: YES

## 2019-12-17 ENCOUNTER — HOSPITAL ENCOUNTER (OUTPATIENT)
Dept: LAB | Age: 34
Discharge: HOME | End: 2019-12-17

## 2019-12-17 ENCOUNTER — OFFICE VISIT (OUTPATIENT)
Dept: OBGYN CLINIC | Age: 34
End: 2019-12-17

## 2019-12-17 VITALS — DIASTOLIC BLOOD PRESSURE: 70 MMHG | SYSTOLIC BLOOD PRESSURE: 102 MMHG | BODY MASS INDEX: 45.42 KG/M2 | WEIGHT: 290 LBS

## 2019-12-17 DIAGNOSIS — Z97.5 NEXPLANON IN PLACE: ICD-10-CM

## 2019-12-17 DIAGNOSIS — N92.1 BREAKTHROUGH BLEEDING ON NEXPLANON: ICD-10-CM

## 2019-12-17 DIAGNOSIS — N93.9 ABNORMAL UTERINE BLEEDING: ICD-10-CM

## 2019-12-17 DIAGNOSIS — Z97.5 BREAKTHROUGH BLEEDING ON NEXPLANON: ICD-10-CM

## 2019-12-17 DIAGNOSIS — G43.809 OTHER MIGRAINE WITHOUT STATUS MIGRAINOSUS, NOT INTRACTABLE: ICD-10-CM

## 2019-12-17 DIAGNOSIS — R53.83 FATIGUE, UNSPECIFIED TYPE: Primary | ICD-10-CM

## 2019-12-17 DIAGNOSIS — R53.83 FATIGUE, UNSPECIFIED TYPE: ICD-10-CM

## 2019-12-17 PROBLEM — G43.909 MIGRAINE: Status: ACTIVE | Noted: 2019-12-17

## 2019-12-17 LAB
ERYTHROCYTE [DISTWIDTH] IN BLOOD BY AUTOMATED COUNT: 12.5 % (ref 11.5–14.5)
HCT VFR BLD AUTO: 42.6 % (ref 35–47)
HGB BLD-MCNC: 13.9 G/DL (ref 11.5–16)
MCH RBC QN AUTO: 32.9 PG (ref 26–34)
MCHC RBC AUTO-ENTMCNC: 32.6 G/DL (ref 30–36.5)
MCV RBC AUTO: 100.9 FL (ref 80–99)
NRBC # BLD: 0 K/UL (ref 0–0.01)
NRBC BLD-RTO: 0 PER 100 WBC
PLATELET # BLD AUTO: 323 K/UL (ref 150–400)
PMV BLD AUTO: 10.3 FL (ref 8.9–12.9)
RBC # BLD AUTO: 4.22 M/UL (ref 3.8–5.2)
TSH SERPL DL<=0.05 MIU/L-ACNC: 1.69 UIU/ML (ref 0.36–3.74)
WBC # BLD AUTO: 9 K/UL (ref 3.6–11)

## 2019-12-17 RX ORDER — PROPRANOLOL HYDROCHLORIDE 120 MG/1
CAPSULE, EXTENDED RELEASE ORAL
Refills: 1 | COMMUNITY
Start: 2019-11-20 | End: 2021-10-13

## 2019-12-17 NOTE — PROGRESS NOTES
[facility]  [email or web address]    Chapito Solorio MD, [address] [facility]       OB/GYN Follow-up visit    Chief Complaint: Follow up visit  Chief Complaint   Patient presents with    Post OP Follow Up     Nexplanon Insertion    Other     AUB         History of Present Illness: This is a follow up visit from 09/30/2019. She is having a follow up for Nexplanon Insertion. The patient presents for a 3 month follow up nexplanon insertion in her left arm. She reports doing well and has spotting off and on with the nexplanon. She does report a low sex drive. bleeding on and off x 2 wks    She reports the symptoms are is unchanged. Aggravating factors include none. Alleviating factors include none. She does not have other concerns. LMP: No LMP recorded. Patient has had an implant. PFSH:  Past Medical History:   Diagnosis Date    Familial tremor     right hand    Headache     migraine, +aura/tunnel vision    Pap smear for cervical cancer screening 09/06/2017    Negative, HPV negative    Routine Papanicolaou smear 2016    Negative per pt. Past Surgical History:   Procedure Laterality Date    HX WISDOM TEETH EXTRACTION  05/2017     Family History   Problem Relation Age of Onset    Colon Cancer Maternal Grandfather     Diabetes Paternal Grandmother      Social History     Tobacco Use    Smoking status: Never Smoker    Smokeless tobacco: Never Used    Tobacco comment: Never used vapor or e-cigs    Substance Use Topics    Alcohol use: No    Drug use: No     Allergies   Allergen Reactions    [place] Anaphylaxis    Other Plant, Animal, Environmental Other (comments)     Dust, mold, mildew     Current Outpatient Medications   Medication Sig    SUMAtriptan (IMITREX) 50 mg tablet Take 50 mg by mouth once as needed for Migraine.  propranolol LA (INDERAL LA) 120 mg SR capsule TAKE 1 CAPSULE BY MOUTH EVERY DAY     No current facility-administered medications for this visit. Review of Systems:  History obtained from the patient  Constitutional: negative for fevers, chills and weight loss  ENT ROS: negative for - hearing change, oral lesions or visual changes  Respiratory: negative for cough, wheezing or dyspnea on exertion  Cardiovascular: negative for chest pain, irregular heart beats, exertional chest pressure/discomfort  Gastrointestinal: negative for dysphagia, nausea and vomiting  Genito-Urinary ROS: see HPI  Inteument/breast: negative for rash, breast lump and nipple discharge  Musculoskeletal:negative for stiff joints, neck pain and muscle weakness  Endocrine ROS: negative for - breast changes, galactorrhea or temperature intolerance  Hematological and Lymphatic ROS: negative for - blood clots, bruising or swollen lymph nodes      Physical Exam:  Visit Vitals  /70   Wt 290 lb (131.5 kg)   BMI 45.42 kg/m²       GENERAL: alert, well appearing, and in no distress  Neck: no masses  Declined pelvic  LUE: nexplanon intact  NEURO: alert, oriented, normal speech    Assessment:  Encounter Diagnoses   Name Primary?  Fatigue, unspecified type Yes    Abnormal uterine bleeding     Nexplanon in place     Breakthrough bleeding on Nexplanon     Other migraine without status migrainosus, not intractable        Plan:  The patient is advised that she should contact the office with any questions or concerns. She should make her routine annual gynecologic appointment if needed. Recommend dietary calcium 1200mg per day, vitamin D 400-800 international units per day and daily weight bearing exercise 30 minutes per day. Labs  We discussed safer NSAID dosing for heavy cycles. Pt was advised to take this dose with food and not to take for more than 5 days. Disc RBA including bleeding/gastric irritation. PIERRE QUINTANA if NI to discuss other options. Disc option of removal if NI, considering vasectomy.   Disc hormonal birth control and libido: pt attributes change to AUB    Orders Placed This Encounter    TSH 3RD GENERATION    CBC W/O DIFF       No results found for this visit on 12/17/19.     Urbano Rocha MD

## 2019-12-17 NOTE — PATIENT INSTRUCTIONS
Implant for Birth Control: Care Instructions  Your Care Instructions    The implant is used to prevent pregnancy. It's a thin joy about the size of a matchstick that is inserted under the skin (subdermal) on the inside of your arm. The implant prevents pregnancy for 3 years. After it is put in, you don't have to do anything else to prevent pregnancy. Follow-up care is a key part of your treatment and safety. Be sure to make and go to all appointments, and call your doctor if you are having problems. It's also a good idea to know your test results and keep a list of the medicines you take. How can you care for yourself at home? How do you use the subdermal implant? · The implant is put in and taken out by your doctor or another trained health professional. This is done in your doctor's office. It only takes a few minutes. · Ask your doctor if you need to use backup birth control, such as a condom. And ask if (and how long) you should avoid intercourse after you get the implant. You may need to do this, depending on where you are in your cycle. What else do you need to know? · The implant has side effects. ? You may have changes in your period. Your period may stop. You may also have spotting or bleeding between periods. ? You may have mood changes, less interest in sex, or weight gain. · The implant can stay in place for up to 3 years to prevent pregnancy. But your doctor may talk to you about leaving it in for longer. ? If you don't replace the implant and don't use another form of birth control, you could get pregnant. ? If you have the implant removed, you'll have to find another method of birth control. If you don't, you may get pregnant. ? Even if you are planning to get pregnant, you have to have the implant removed. · Check with your doctor before you use any other medicines. This includes over-the-counter medicines, vitamins, herbal products, and supplements.  Birth control hormones may not work as well to prevent pregnancy when combined with other medicines. · The implant doesn't protect against sexually transmitted infections (STIs), such as herpes or HIV/AIDS. If you're not sure if your sex partner might have an STI, use a condom to protect against infection. When should you call for help? Watch closely for changes in your health, and be sure to contact your doctor if you have any problems. Where can you learn more? Go to http://chris-danette.info/. Enter J042 in the search box to learn more about \"Implant for Birth Control: Care Instructions. \"  Current as of: May 29, 2019  Content Version: 12.2  © 7943-9625 [employer], [employer] Care instructions adapted under license by [employer] (which disclaims liability or warranty for this information). If you have questions about a medical condition or this instruction, always ask your healthcare professional. [address] any warranty or liability for your use of this information.

## 2020-01-03 NOTE — PROGRESS NOTES
Patient aware of results and MD recommendations by ezequiel.     Patient Result Comments     Viewed by Prosper Heaton on 12/18/2019 10:47 AM   Written by Krystina Norman MD on 12/17/2019 10:26 PM

## 2020-09-16 NOTE — PATIENT INSTRUCTIONS
Well Visit, Ages 25 to 48: Care Instructions Your Care Instructions Physical exams can help you stay healthy. Your doctor has checked your overall health and may have suggested ways to take good care of yourself. He or she also may have recommended tests. At home, you can help prevent illness with healthy eating, regular exercise, and other steps. Follow-up care is a key part of your treatment and safety. Be sure to make and go to all appointments, and call your doctor if you are having problems. It's also a good idea to know your test results and keep a list of the medicines you take. How can you care for yourself at home? · Reach and stay at a healthy weight. This will lower your risk for many problems, such as obesity, diabetes, heart disease, and high blood pressure. · Get at least 30 minutes of physical activity on most days of the week. Walking is a good choice. You also may want to do other activities, such as running, swimming, cycling, or playing tennis or team sports. Discuss any changes in your exercise program with your doctor. · Do not smoke or allow others to smoke around you. If you need help quitting, talk to your doctor about stop-smoking programs and medicines. These can increase your chances of quitting for good. · Talk to your doctor about whether you have any risk factors for sexually transmitted infections (STIs). Having one sex partner (who does not have STIs and does not have sex with anyone else) is a good way to avoid these infections. · Use birth control if you do not want to have children at this time. Talk with your doctor about the choices available and what might be best for you. · Protect your skin from too much sun. When you're outdoors from 10 a.m. to 4 p.m., stay in the shade or cover up with clothing and a hat with a wide brim. Wear sunglasses that block UV rays. Even when it's cloudy, put broad-spectrum sunscreen (SPF 30 or higher) on any exposed skin. · See a dentist one or two times a year for checkups and to have your teeth cleaned. · Wear a seat belt in the car. Follow your doctor's advice about when to have certain tests. These tests can spot problems early. For everyone · Cholesterol. Have the fat (cholesterol) in your blood tested after age 21. Your doctor will tell you how often to have this done based on your age, family history, or other things that can increase your risk for heart disease. · Blood pressure. Have your blood pressure checked during a routine doctor visit. Your doctor will tell you how often to check your blood pressure based on your age, your blood pressure results, and other factors. · Vision. Talk with your doctor about how often to have a glaucoma test. 
· Diabetes. Ask your doctor whether you should have tests for diabetes. · Colon cancer. Your risk for colorectal cancer gets higher as you get older. Some experts say that adults should start regular screening at age 48 and stop at age 76. Others say to start before age 48 or continue after age 76. Talk with your doctor about your risk and when to start and stop screening. For women · Breast exam and mammogram. Talk to your doctor about when you should have a clinical breast exam and a mammogram. Medical experts differ on whether and how often women under 50 should have these tests. Your doctor can help you decide what is right for you. · Cervical cancer screening test and pelvic exam. Begin with a Pap test at age 24. The test often is part of a pelvic exam. Starting at age 27, you may choose to have a Pap test, an HPV test, or both tests at the same time (called co-testing). Talk with your doctor about how often to have testing. · Tests for sexually transmitted infections (STIs). Ask whether you should have tests for STIs. You may be at risk if you have sex with more than one person, especially if your partners do not wear condoms. For men · Tests for sexually transmitted infections (STIs). Ask whether you should have tests for STIs. You may be at risk if you have sex with more than one person, especially if you do not wear a condom. · Testicular cancer exam. Ask your doctor whether you should check your testicles regularly. · Prostate exam. Talk to your doctor about whether you should have a blood test (called a PSA test) for prostate cancer. Experts differ on whether and when men should have this test. Some experts suggest it if you are older than 39 and are -American or have a father or brother who got prostate cancer when he was younger than 72. When should you call for help? Watch closely for changes in your health, and be sure to contact your doctor if you have any problems or symptoms that concern you. Where can you learn more? Go to http://chris-danette.info/ Enter P072 in the search box to learn more about \"Well Visit, Ages 25 to 48: Care Instructions. \" Current as of: May 27, 2020               Content Version: 12.6 © 9926-7122 [employer]. Care instructions adapted under license by [employer] (which disclaims liability or warranty for this information). If you have questions about a medical condition or this instruction, always ask your healthcare professional. [address] any warranty or liability for your use of this information.

## 2020-09-17 ENCOUNTER — OFFICE VISIT (OUTPATIENT)
Dept: OBGYN CLINIC | Age: 35
End: 2020-09-17
Payer: COMMERCIAL

## 2020-09-17 VITALS — SYSTOLIC BLOOD PRESSURE: 103 MMHG | WEIGHT: 286 LBS | DIASTOLIC BLOOD PRESSURE: 66 MMHG | BODY MASS INDEX: 44.79 KG/M2

## 2020-09-17 DIAGNOSIS — Z01.419 ENCOUNTER FOR WELL WOMAN EXAM: Primary | ICD-10-CM

## 2020-09-17 DIAGNOSIS — N93.9 ABNORMAL UTERINE BLEEDING: ICD-10-CM

## 2020-09-17 DIAGNOSIS — Z97.5 NEXPLANON IN PLACE: ICD-10-CM

## 2020-09-17 PROBLEM — O41.00X0 OLIGOHYDRAMNIOS: Status: RESOLVED | Noted: 2018-03-23 | Resolved: 2020-09-17

## 2020-09-17 PROBLEM — Z34.00 PRENATAL CARE OF PRIMIGRAVIDA, ANTEPARTUM: Status: RESOLVED | Noted: 2017-08-28 | Resolved: 2020-09-17

## 2020-09-17 PROCEDURE — 99395 PREV VISIT EST AGE 18-39: CPT | Performed by: OBSTETRICS & GYNECOLOGY

## 2020-09-17 NOTE — PROGRESS NOTES
[facility]  [email or web address]  [phone]    Naman Keller MD, FACOG       Annual Gynecologic Exam:  WWE <40  Chief Complaint   Patient presents with    Well Woman         Artis Garcia is a 37 y.o.  [facility] female who presents for an annual well woman exam.  Patient's last menstrual period was 2020 (exact date). .    With regard to the Gardisil vaccine, she has received all 3 injections. She does report additional concerns today. Bleeding 13-14 days/ months. Menstrual status:  Her periods are irregular. She does not report dysmenorrhea/painful menses. She does report irregular bleeding. Sexual history and Contraception:  Social History     Substance and Sexual Activity   Sexual Activity Yes    Partners: Male    Birth control/protection: Implant     She never use condoms with sexual activity  She does not reports new sexual partner(s) in the last year. The patient does not request STD testing. We recommended testing per CDC guidelines and at patient request.     Preventive Medicine History:  Her most recent Pap smear result: normal was obtained in 2017  Her most recent HR HPV screen was Negative obtained in   She does not have a history of FELIX 2, 3 or cervical cancer. Past Medical History:   Diagnosis Date    Familial tremor     right hand    Headache     migraine, +aura/tunnel vision    Pap smear for cervical cancer screening 2017    Negative, HPV negative    Routine Papanicolaou smear     Negative per pt.       OB History    Para Term  AB Living   1 1 1     1   SAB TAB Ectopic Molar Multiple Live Births             1      # Outcome Date GA Lbr Antwan/2nd Weight Sex Delivery Anes PTL Lv   1 Term 18 38w0d  6 lb 4.5 oz (2.849 kg) F Vag-Spont EPI N HARDEEP      Obstetric Comments   Menarche 8, LMP 21, # of children 1, age of 1st delivery 29, Hysterectomy/oophorectomy no/no, Breast bx no, history of breast feeding yes, BCP yes, Hormone therapy no     Past Surgical History:   Procedure Laterality Date    HX WISDOM TEETH EXTRACTION  05/2017     Family History   Problem Relation Age of Onset    Colon Cancer Maternal Grandfather     Diabetes Paternal Grandmother      Social History     Socioeconomic History    Marital status:      Spouse name: Not on file    Number of children: Not on file    Years of education: Not on file    Highest education level: Not on file   Occupational History    Not on file   Social Needs    Financial resource strain: Not on file    Food insecurity     Worry: Not on file     Inability: Not on file    Transportation needs     Medical: Not on file     Non-medical: Not on file   Tobacco Use    Smoking status: Never Smoker    Smokeless tobacco: Never Used    Tobacco comment: Never used vapor or e-cigs    Substance and Sexual Activity    Alcohol use: No    Drug use: No    Sexual activity: Yes     Partners: Male     Birth control/protection: Implant   Lifestyle    Physical activity     Days per week: Not on file     Minutes per session: Not on file    Stress: Not on file   Relationships    Social connections     Talks on phone: Not on file     Gets together: Not on file     Attends Orthodoxy service: Not on file     Active member of club or organization: Not on file     Attends meetings of clubs or organizations: Not on file     Relationship status: Not on file    Intimate partner violence     Fear of current or ex partner: Not on file     Emotionally abused: Not on file     Physically abused: Not on file     Forced sexual activity: Not on file   Other Topics Concern    Not on file   Social History Narrative    Not on file       Allergies   Allergen Reactions    [place] Anaphylaxis    Other Plant, Animal, Environmental Other (comments)     Dust, mold, mildew       Current Outpatient Medications   Medication Sig    propranolol LA (INDERAL LA) 120 mg SR capsule TAKE 1 CAPSULE BY MOUTH EVERY DAY    SUMAtriptan (IMITREX) 50 mg tablet Take 50 mg by mouth once as needed for Migraine. No current facility-administered medications for this visit.         Patient Active Problem List   Diagnosis Code    Prenatal care of primigravida, antepartum Z34.00    Oligohydramnios O41.00X0    Obesity, morbid ([facility] [address] E66.01    Migraine G43.909       Review of Systems - History obtained from the patient  Constitutional: negative for weight loss, fever, night sweats  HEENT: negative for hearing loss, earache, congestion, snoring, sorethroat  CV: negative for chest pain, palpitations, edema  Resp: negative for cough, shortness of breath, wheezing  GI: negative for change in bowel habits, abdominal pain, black or bloody stools  : negative for frequency, dysuria, hematuria  GYN: see HPI  MSK: negative for back pain, joint pain, muscle pain  Breast: negative for breast lumps, nipple discharge, galactorrhea  Skin :negative for itching, rash, hives  Neuro: negative for dizziness, headache, confusion, weakness  Psych: negative for anxiety, depression, change in mood  Heme/lymph: negative for bleeding, bruising, pallor      (WWE continued)     Physical Exam  Visit Vitals  /66   Wt 286 lb (129.7 kg)   LMP 09/01/2020 (Exact Date)   BMI 44.79 kg/m²       Constitutional  · Appearance: well-nourished, well developed, alert, in no acute distress    HENT  · Head and Face: appears normal    Neck  · Inspection/Palpation: normal appearance, no masses or tenderness  · Lymph Nodes: no lymphadenopathy present  · Thyroid: gland size normal, nontender, no nodules or masses present on palpation    Chest  · Respiratory Effort: breathing unlabored  · Auscultation: normal breath sounds    Cardiovascular  · Heart:  · Auscultation: regular rate and rhythm without murmur    Breasts  · Inspection of Breasts: breasts symmetrical, no skin changes, no discharge present, nipple appearance normal, no skin retraction present  · Palpation of Breasts and Axillae: no masses present on palpation, no breast tenderness  · Axillary Lymph Nodes: no lymphadenopathy present    Gastrointestinal  · Abdominal Examination: abdomen non-tender to palpation, normal bowel sounds, no masses present  · Liver and spleen: no hepatomegaly present, spleen not palpable  · Hernias: no hernias identified    Genitourinary  · External Genitalia: normal appearance for age, no discharge present, no tenderness present, no inflammatory lesions present, no masses present  · Vagina: normal vaginal vault without central or paravaginal defects, min white discharge present, no inflammatory lesions present, no masses present  · Bladder: non-tender to palpation  · Urethra: appears normal  · Cervix: normal   · Uterus: normal size, shape and consistency  · Adnexa: no adnexal tenderness present, no adnexal masses present  · Perineum: perineum within normal limits, no evidence of trauma, no rashes or skin lesions present  · Anus: anus within normal limits, no hemorrhoids present  · Inguinal Lymph Nodes: no lymphadenopathy present    Skin  · General Inspection: no rash, no lesions identified    Neurologic/Psychiatric  · Mental Status:  · Orientation: grossly oriented to person, place and time  · Mood and Affect: mood normal, affect     nexplanon intact in place LUE    Assessment:  37 y.o.  for well woman exam  Encounter Diagnoses   Name Primary?  Encounter for well woman exam Yes    Abnormal uterine bleeding     Nexplanon in place        Plan:  The patient was counseled about diet, exercise, healthy lifestyle  We discussed self breast exam  We discussed safer sex practices, condom use and risk factors for sexually transmitted diseases. We discussed current pap smear and HR HPV testing guidelines.    We recommend follow up one year for routine annual gynecologic exam or sooner prn  We recommend routine follow up with her primary care doctor for management of chronic medical problems and non-gynecologic concerns  Handouts were given to the patient  We discussed calcium/vitamin D/weight bearing exercise and osteoporosis prevention  We discussed potential causes of symptomatic bleeding: including but not limited to hormonal, medical, infection/inflammation and structural etiologies. Schedule nexplanon removal when ready (wants to do vasectomy for partner first)  If aub does not improve after nexplanon removal : notify MD Marie up:  [x] return for annual well woman exam in one year or sooner if she is having problems  [] follow up and ultrasound  [] 6 months  [] 3 months  [] 6 weeks   [] 1 month    Orders Placed This Encounter    PAP IG, APTIMA HPV AND RFX 16/18,45 ([id number])       No results found for any visits on 09/17/20.

## 2020-09-22 LAB
CYTOLOGIST CVX/VAG CYTO: NORMAL
CYTOLOGY CVX/VAG DOC CYTO: NORMAL
CYTOLOGY CVX/VAG DOC THIN PREP: NORMAL
CYTOLOGY HISTORY:: NORMAL
DX ICD CODE: NORMAL
HPV I/H RISK 4 DNA CVX QL PROBE+SIG AMP: NEGATIVE
Lab: NORMAL
OTHER STN SPEC: NORMAL
STAT OF ADQ CVX/VAG CYTO-IMP: NORMAL

## 2020-09-23 NOTE — PROGRESS NOTES
Updated PMH. [facility] Sent letter in the mail.

## 2021-03-15 ENCOUNTER — OFFICE VISIT (OUTPATIENT)
Dept: OBGYN CLINIC | Age: 36
End: 2021-03-15

## 2021-03-15 VITALS — DIASTOLIC BLOOD PRESSURE: 76 MMHG | WEIGHT: 293 LBS | SYSTOLIC BLOOD PRESSURE: 108 MMHG | BODY MASS INDEX: 46.17 KG/M2

## 2021-03-15 DIAGNOSIS — Z30.46 NEXPLANON REMOVAL: Primary | ICD-10-CM

## 2021-03-15 DIAGNOSIS — N93.9 ABNORMAL UTERINE BLEEDING: ICD-10-CM

## 2021-03-15 NOTE — PROGRESS NOTES
_ [facility]  [email or web address]  [phone]    Dennis Garzon MD, FACOG       Procedure: Nexplanon Removal        Chart reviewed for the following:   Saira QUINONES, have reviewed the History, Physical and updated the Allergic reactions for [place] performed immediately prior to start of procedure:   Saira QUINONES, have performed the following reviews on [facility] prior to the start of the procedure:            * Patient was identified by name and date of birth   * Agreement on procedure being performed was verified  * Risks and Benefits explained to the patient  * Procedure site verified and marked as necessary  * Patient was positioned for comfort  * Consent was signed and verified     Time: 3:26 PM      Date of procedure: 3/15/2021    Procedure performed by: Wendy Garcia MD    Provider assisted by:   Pato Arita. Patient assisted by: self    How tolerated by patient: tolerated the procedure well with no complications    Post Procedural Pain Scale: 0 - No Hurt    Comments: none    Procedure: The patient presents for office removal of a NEXPLANON sub-dermal contraceptive implant. Patient elects removal because \"its terrible\" she has been on her period since she got it. 30 days of period 10 days no cycle. Heavy periods with cramping/pain. Changing pad every 2 hours. Pt reports her mother had an ablation and a balloon procedure. She was positioned so the site of her implant was visable and easily accessible. The implant was located by palpation. The end of the implant nearest the elbow was marked with a sterile marker. The operative site was cleansed with Betadine. Using a 27 gauge needle on a 5 cc syringe and 1% lidocaine, 3 cc were infiltrated as a intradermal wheal and underneath the end of the implant closest to the elbow.  Downward pressure was applied on the end of the implant nearest the axilla and a 2-3mm incision was made in the longitudinal direction of the arm at the tip of the implant closest to the elbow. The implant was then pushed gently toward the incision until the tip was visible. The fibrous capsule was opened with a combination of blunt and sharp dissection. The implant was grasped with mosquito forceps and removed intact. It measured a full 4 cm in length. The skin was cleansed and dried. A steristrip was applied then topped with a folded 4x4 gauze and a Curlex pressure dressing. There were no complication or problems. She demonstrated full active range of movement of her elbow, wrist, all five digits and denied numbness and tingling. Post-procedure:  She was told to remove the pressure dressing in 12-24 hours, to keep the incision area dry for 24 hours and to remove the Steristrip in several days. She was instructed to contact the office with any questions or concerns and bleeding and infection precautions were reviewed with her. We discussed typical bleeding patterns after removal of the Nexplanon. Recommend follow up for well woman exam or sooner prn. Bleeding precautions  We discussed potential causes of symptomatic bleeding: including but not limited to hormonal, medical, infection/inflammation and structural etiologies. Pt prefers observation    rec fu and us for heavy/aub.

## 2021-10-13 ENCOUNTER — OFFICE VISIT (OUTPATIENT)
Dept: OBGYN CLINIC | Age: 36
End: 2021-10-13
Payer: COMMERCIAL

## 2021-10-13 VITALS
WEIGHT: 290.2 LBS | SYSTOLIC BLOOD PRESSURE: 126 MMHG | BODY MASS INDEX: 45.45 KG/M2 | DIASTOLIC BLOOD PRESSURE: 89 MMHG | HEART RATE: 76 BPM

## 2021-10-13 DIAGNOSIS — Z01.419 ENCOUNTER FOR GYNECOLOGICAL EXAMINATION (GENERAL) (ROUTINE) WITHOUT ABNORMAL FINDINGS: Primary | ICD-10-CM

## 2021-10-13 DIAGNOSIS — N94.6 DYSMENORRHEA: ICD-10-CM

## 2021-10-13 PROCEDURE — 99395 PREV VISIT EST AGE 18-39: CPT | Performed by: OBSTETRICS & GYNECOLOGY

## 2021-10-13 RX ORDER — IBUPROFEN 800 MG/1
800 TABLET ORAL
Qty: 30 TABLET | Refills: 0 | Status: SHIPPED | OUTPATIENT
Start: 2021-10-13 | End: 2022-04-28

## 2021-10-13 NOTE — PROGRESS NOTES
[facility]  [email or web address]  [phone]    Amina Newsome MD, FACOG       Annual Gynecologic Exam:  WWE <40  Chief Complaint   Patient presents with    Well Woman         Ania Garrett is a 31 y.o.  [address] [facility] female who presents for an annual well woman exam.  Patient's last menstrual period was 2021 (exact date). .    She reports the following additional concerns: Pt reports she has terrible cramping. She is nervous to try any medications because she reports that she seems to get migraines when starting a new medication. Dysmenorrhea, miss work x 1 day, works remotely. Menstrual status:  She does not report dysmenorrhea/painful menses. She does not report heavy menses. She does not report irregular bleeding. Sexual history and Contraception:  Social History     Substance and Sexual Activity   Sexual Activity Yes    Partners: Male    Birth control/protection: Implant, Surgical    Comment: vasectomy       She does not reports new sexual partner(s) in the last year. Preventive Medicine History:  Her most recent Pap smear result: normal was obtained in 2020  Her most recent HR HPV screen was Negative obtained in     She does not have a history of FELIX 2, 3 or cervical cancer. Past Medical History:   Diagnosis Date    Familial tremor     right hand    Headache     migraine, +aura/tunnel vision    Pap smear for cervical cancer screening 2017;  2020    Negative, HPV negative;  Negative, Hpv Negative    Routine Papanicolaou smear     Negative per pt.       OB History    Para Term  AB Living   1 1 1     1   SAB TAB Ectopic Molar Multiple Live Births             1      # Outcome Date GA Lbr Antwan/2nd Weight Sex Delivery Anes PTL Lv   1 Term 18 38w0d  6 lb 4.5 oz (2.849 kg) F Vag-Spont EPI N HARDEEP      Obstetric Comments   Menarche 8, LMP 21, # of children 1, age of 1st delivery 29, Hysterectomy/oophorectomy no/no, Breast bx no, history of breast feeding yes, BCP yes, Hormone therapy no     Past Surgical History:   Procedure Laterality Date    HX WISDOM TEETH EXTRACTION  05/2017     Family History   Problem Relation Age of Onset    Colon Cancer Maternal Grandfather     Diabetes Paternal Grandmother      Social History     Socioeconomic History    Marital status:      Spouse name: Not on file    Number of children: Not on file    Years of education: Not on file    Highest education level: Not on file   Occupational History    Not on file   Tobacco Use    Smoking status: Never Smoker    Smokeless tobacco: Never Used    Tobacco comment: Never used vapor or e-cigs    Substance and Sexual Activity    Alcohol use: No    Drug use: No    Sexual activity: Yes     Partners: Male     Birth control/protection: Implant, Surgical     Comment: vasectomy   Other Topics Concern    Not on file   Social History Narrative    Not on file     Social Determinants of Health     Financial Resource Strain:     Difficulty of Paying Living Expenses:    Food Insecurity:     Worried About Running Out of Food in the Last Year:     Ran Out of Food in the Last Year:    Transportation Needs:     Lack of Transportation (Medical):  Lack of Transportation (Non-Medical):    Physical Activity:     Days of Exercise per Week:     Minutes of Exercise per Session:    Stress:     Feeling of Stress :    Social Connections:     Frequency of Communication with Friends and Family:     Frequency of Social Gatherings with Friends and Family:     Attends Oriental orthodox Services:     Active Member of Clubs or Organizations:     Attends Club or Organization Meetings:     Marital Status:    Intimate Partner Violence:     Fear of Current or Ex-Partner:     Emotionally Abused:     Physically Abused:     Sexually Abused:         Allergies   Allergen Reactions    [place] Anaphylaxis    Other Plant, Animal, Environmental Other (comments)     Dust, mold, mildew       Current Outpatient Medications   Medication Sig    ibuprofen (MOTRIN) 800 mg tablet Take 1 Tablet by mouth every eight (8) hours as needed for Pain. For up to five days. No current facility-administered medications for this visit.        Patient Active Problem List   Diagnosis Code    Ob[facility], morbid ([facility] E66.01    Migraine G43.909         Review of Systems - History obtained from the patient and patient filled out questionnaire   Constitutional/general, HEENT, CV, Resp, GI, MSK, Neuro, Psych, Heme/lymph, Skin, Breast ROS: no significant complaints except as noted on HPI    Physical Exam  Visit Vitals  /89   Pulse 76   Wt 290 lb 3.2 oz (131.6 kg)   LMP 09/30/2021 (Exact Date)   BMI 45.45 kg/m²       Constitutional  · Appearance: well-nourished, well developed, alert, in no acute distress    HENT  · Head and Face: appears normal    Neck  · Inspection/Palpation: normal appearance, no masses or tenderness  · Lymph Nodes: no lymphadenopathy present  · Thyroid: gland size normal, nontender, no nodules or masses present on palpation    Chest  · Respiratory Effort: breathing unlabored  · Auscultation: normal breath sounds    Cardiovascular  · Heart:  · Auscultation: regular rate and rhythm without murmur    Breasts  · Inspection of Breasts: breasts symmetrical, no skin changes, no discharge present, nipple appearance normal, no skin retraction present  · Palpation of Breasts and Axillae: no masses present on palpation, no breast tenderness  · Axillary Lymph Nodes: no lymphadenopathy present    Gastrointestinal  · Abdominal Examination: abdomen non-tender to palpation, normal bowel sounds, no masses present  · Liver and spleen: no hepatomegaly present, spleen not palpable  · Hernias: no hernias identified    Genitourinary  · External Genitalia: normal appearance for age, no discharge present, no tenderness present, no inflammatory lesions present, no masses present  · Vagina: normal vaginal vault without central or paravaginal defects, no discharge present, no inflammatory lesions present, no masses present  · Bladder: non-tender to palpation  · Urethra: appears normal  · Cervix: normal   · Uterus: normal size, shape and consistency  · Adnexa: no adnexal tenderness present, no adnexal masses present  · Perineum: perineum within normal limits, no evidence of trauma, no rashes or skin lesions present  · Anus: anus within normal limits, no hemorrhoids present  · Inguinal Lymph Nodes: no lymphadenopathy present     Skin  · General Inspection: no rash, no lesions identified    Neurologic/Psychiatric  · Mental Status:  · Orientation: grossly oriented to person, place and time  · Mood and Affect: mood normal, affect appropriate    Assessment:  31 y.o.  for well woman exam  Encounter Diagnoses   Name Primary?  Encounter for gynecological examination (general) (routine) without abnormal findings Yes    Dysmenorrhea        Plan:  The patient was counseled about diet, exercise, healthy lifestyle  We discussed current pap smear and HR HPV testing guidelines. I recommended follow up one year for routine annual gynecologic exam or sooner prn  Handouts were given to the patient  I recommended follow up with a primary care physician for chronic medical problems and evaluation of non-gynecologic concerns and to please contact our office with any GYN questions or concerns.   I recommended testing per CDC guidelines and at patient request.   We discussed potential causes of lower abdominal/pelvic pain: GYN, GI, , musculoskeletal, infectious process, adhesions, or other etiology  We discussed evaluation of lower abdominal/pelvic pain: including but not limited to observation, surgical evaluation/laparoscopy, imaging   We discussed treatment of lower abdominal/pelvic pain: including but not limited to: pain medication, hormonal management, surgical intervention, bowel regimen. Since pain can be a symptoms of an underlying abnormal process she is encouraged to contact my office with persistent symptoms for additional evaluation and treatment if needed. FU and US  Disc options for dysmenorrhea, pt defers hormonal management, not well tolerated. We discussed safer NSAID dosing for heavy cycles. Pt was advised to take this dose with food and not to take for more than 5 days. Disc RBA including bleeding/gastric irritation. FU MD if NI to discuss other options. rx sent      Folllow up:  [x] return for annual well woman exam in one year or sooner if she is having problems  [] follow up and ultrasound  [] 6 months  [] 3 months  [] 6 weeks   [] 1 month    Orders Placed This Encounter    ibuprofen (MOTRIN) 800 mg tablet       No results found for any visits on 10/13/21.

## 2021-10-13 NOTE — PATIENT INSTRUCTIONS
Well Visit, Ages 25 to 48: Care Instructions  Overview     Well visits can help you stay healthy. Your doctor has checked your overall health and may have suggested ways to take good care of yourself. Your doctor also may have recommended tests. At home, you can help prevent illness with healthy eating, regular exercise, and other steps. Follow-up care is a key part of your treatment and safety. Be sure to make and go to all appointments, and call your doctor if you are having problems. It's also a good idea to know your test results and keep a list of the medicines you take. How can you care for yourself at home? · Get screening tests that you and your doctor decide on. Screening helps find diseases before any symptoms appear. · Eat healthy foods. Choose fruits, vegetables, whole grains, protein, and low-fat dairy foods. Limit fat, especially saturated fat. Reduce salt in your diet. · Limit alcohol. If you are a man, have no more than 2 drinks a day or 14 drinks a week. If you are a woman, have no more than 1 drink a day or 7 drinks a week. · Get at least 30 minutes of physical activity on most days of the week. Walking is a good choice. You also may want to do other activities, such as running, swimming, cycling, or playing tennis or team sports. Discuss any changes in your exercise program with your doctor. · Reach and stay at a healthy weight. This will lower your risk for many problems, such as obesity, diabetes, heart disease, and high blood pressure. · Do not smoke or allow others to smoke around you. If you need help quitting, talk to your doctor about stop-smoking programs and medicines. These can increase your chances of quitting for good. · Care for your mental health. It is easy to get weighed down by worry and stress. Learn strategies to manage stress, like deep breathing and mindfulness, and stay connected with your family and community.  If you find you often feel sad or hopeless, talk with your doctor. Treatment can help. · Talk to your doctor about whether you have any risk factors for sexually transmitted infections (STIs). You can help prevent STIs if you wait to have sex with a new partner (or partners) until you've each been tested for STIs. It also helps if you use condoms (male or female condoms) and if you limit your sex partners to one person who only has sex with you. Vaccines are available for some STIs, such as HPV. · Use birth control if it's important to you to prevent pregnancy. Talk with your doctor about the choices available and what might be best for you. · If you think you may have a problem with alcohol or drug use, talk to your doctor. This includes prescription medicines (such as amphetamines and opioids) and illegal drugs (such as cocaine and methamphetamine). Your doctor can help you figure out what type of treatment is best for you. · Protect your skin from too much sun. When you're outdoors from 10 a.m. to 4 p.m., stay in the shade or cover up with clothing and a hat with a wide brim. Wear sunglasses that block UV rays. Even when it's cloudy, put broad-spectrum sunscreen (SPF 30 or higher) on any exposed skin. · See a dentist one or two times a year for checkups and to have your teeth cleaned. · Wear a seat belt in the car. When should you call for help? Watch closely for changes in your health, and be sure to contact your doctor if you have any problems or symptoms that concern you. Where can you learn more? Go to http://www.WelVU.com/  Enter P072 in the search box to learn more about \"Well Visit, Ages 25 to 48: Care Instructions. \"  Current as of: February 11, 2021               Content Version: 13.0  © 4737-7767 Healthwise, Incorporated. Care instructions adapted under license by [employer] (which disclaims liability or warranty for this information).  If you have questions about a medical condition or this instruction, always ask your healthcare professional. [place] [address] any warranty or liability for your use of this information.

## 2021-11-11 ENCOUNTER — OFFICE VISIT (OUTPATIENT)
Dept: OBGYN CLINIC | Age: 36
End: 2021-11-11

## 2021-11-11 VITALS
HEART RATE: 104 BPM | BODY MASS INDEX: 45.99 KG/M2 | DIASTOLIC BLOOD PRESSURE: 83 MMHG | WEIGHT: 293 LBS | HEIGHT: 67 IN | SYSTOLIC BLOOD PRESSURE: 134 MMHG

## 2021-11-11 DIAGNOSIS — Q51.9 UTERINE ANOMALY: Primary | ICD-10-CM

## 2021-11-11 DIAGNOSIS — N94.6 DYSMENORRHEA: ICD-10-CM

## 2021-11-11 DIAGNOSIS — N92.0 MENORRHAGIA WITH REGULAR CYCLE: ICD-10-CM

## 2021-11-11 PROCEDURE — 99213 OFFICE O/P EST LOW 20 MIN: CPT | Performed by: OBSTETRICS & GYNECOLOGY

## 2021-11-11 RX ORDER — TRANEXAMIC ACID 650 1/1
1300 TABLET ORAL 3 TIMES DAILY
Qty: 30 TABLET | Refills: 2 | Status: SHIPPED | OUTPATIENT
Start: 2021-11-11 | End: 2022-04-28

## 2021-11-11 NOTE — PROGRESS NOTES
[facility]  [email or web address]    Jyotsna Tobin MD, [address] [facility]       OB/GYN Follow-up visit    Chief Complaint: Follow up visit  Chief Complaint   Patient presents with    Ultrasound    Follow-up     cramping     Ultrasound:  TRANSVAGINAL ULTRASOUND PERFORMED  UTERUS APPEARS ARCUATE. UTERUS IS ANTEVERTED, NORMAL IN SIZE AND ECHOGENICITY. ENDOMETRIUM MEASURES 5-6MM IN THICKNESS. NO EVIDENCE OF MASSES OR ABNORMALITIES ARE SEEN. RIGHT OVARY APPEARS TO HAVE A RESOLVING CYST WITH BLOODFLOW IN THE PERIPHERY. THIS MAY BE A  RESOLVING HEMORRHAGIC CYST. LEFT OVARY APPEARS WITHIN NORMAL LIMITS. NO FREE FLUID SEEN IN THE CDS. History of Present Illness: This is a follow up visit from 10/13/2021. She is having a follow up for heavy painful cycles with clots. She has been on birth control for 9 years. With Nexplanon her cycles were bad, she has been off Nexplanon for a year or two. Declines birth control today. Declines pelvic exam.   She reports the symptoms are is unchanged. Aggravating factors include none. Alleviating factors include none. She does not have other concerns. LMP: Patient's last menstrual period was 10/25/2021. PFSH:  Past Medical History:   Diagnosis Date    Familial tremor     right hand    Headache     migraine, +aura/tunnel vision    Pap smear for cervical cancer screening 09/06/2017;  09/17/2020    Negative, HPV negative;  Negative, Hpv Negative    Routine Papanicolaou smear 2016    Negative per pt.       Past Surgical History:   Procedure Laterality Date    HX WISDOM TEETH EXTRACTION  05/2017     Family History   Problem Relation Age of Onset    Colon Cancer Maternal Grandfather     Diabetes Paternal Grandmother      Social History     Tobacco Use    Smoking status: Never Smoker    Smokeless tobacco: Never Used    Tobacco comment: Never used vapor or e-cigs    Substance Use Topics    Alcohol use: No    Drug use: No     Allergies   Allergen Reactions    [place] Anaphylaxis    Other Plant, Animal, Environmental Other (comments)     Dust, mold, mildew     Current Outpatient Medications   Medication Sig    tranexamic acid (LYSTEDA) 650 mg tab tablet Take 2 Tablets by mouth three (3) times daily.  ibuprofen (MOTRIN) 800 mg tablet Take 1 Tablet by mouth every eight (8) hours as needed for Pain. For up to five days. No current facility-administered medications for this visit. Review of Systems:  History obtained from the patient  Constitutional: negative for fevers, chills and weight loss  ENT ROS: negative for - hearing change, oral lesions or visual changes  Respiratory: negative for cough, wheezing or dyspnea on exertion  Cardiovascular: negative for chest pain, irregular heart beats, exertional chest pressure/discomfort  Gastrointestinal: negative for dysphagia, nausea and vomiting  Genito-Urinary ROS: no dysuria, trouble voiding, or hematuria  Inteument/breast: negative for rash, breast lump and nipple discharge  Musculoskeletal:negative for stiff joints, neck pain and muscle weakness  Endocrine ROS: negative for - breast changes, galactorrhea or temperature intolerance  Hematological and Lymphatic ROS: negative for - blood clots, bruising or swollen lymph nodes    Physical Exam:  Visit Vitals  /83 (BP 1 Location: Right arm)   Pulse (!) 104   Ht 5' 7\" (1.702 m)   Wt 295 lb (133.8 kg)   Breastfeeding No   BMI 46.20 kg/m²       GENERAL: alert, well appearing, and in no distress  PELVIC; deferred, see wwe  NEURO: alert, oriented, normal speech    Assessment:  Encounter Diagnoses   Name Primary?  Uterine anomaly Yes    Dysmenorrhea     Menorrhagia with regular cycle        Plan:  The patient is advised that she should contact the office with any questions or concerns. She should make her routine annual gynecologic appointment if needed.   Disc causes of uterine anomaly, refer for renal imaging, order placed,  number given  We discussed safer NSAID dosing for heavy cycles. Pt was advised to take this dose with food and not to take for more than 5 days. Disc RBA including bleeding/gastric irritation. FU MD if NI to discuss other options. Will do lysteda disc avoiding other NSAIDs  Disc hormonal management, pt prefers to avoid  Notify MD if NI with lysteda x 3 monhts    On this date, 11/11/2021,  I have spent 20 minutes reviewing previous notes, test results and face to face with the patient discussing the diagnosis and importance of compliance with the treatment plan as well as documenting on the day of the visit. Orders Placed This Encounter    US RETROPERITONEUM COMP    tranexamic acid (LYSTEDA) 650 mg tab tablet       No results found for this visit on 11/11/21. Lance Beard MD    Physician review of ultrasound performed by technician    Today's ultrasound report and images were reviewed and discussed with the patient.   Please see images and imaging report entered by technician in PACS for more detail and progress note and diagnosis entered by MD.    Marily Paulino MD

## 2022-03-19 PROBLEM — E66.01 OBESITY, MORBID (HCC): Status: ACTIVE | Noted: 2018-05-04

## 2022-03-20 PROBLEM — G43.909 MIGRAINE: Status: ACTIVE | Noted: 2019-12-17

## 2022-04-21 ENCOUNTER — TELEPHONE (OUTPATIENT)
Dept: OBGYN CLINIC | Age: 37
End: 2022-04-21

## 2022-04-21 NOTE — TELEPHONE ENCOUNTER
Pt calling c/o a painful protruding breast lump. She states it is warm to the touch and red. She was scheduled for 4/25/22 but wanted to see if she could be seen today instead. Please advise.

## 2022-04-21 NOTE — TELEPHONE ENCOUNTER
If I have an opening I can see her sooner, but I don't think I have any availability today. I would recommend warm/hot compress 3-4 x day for at least 20 minutes, but if symptoms significant: consider urgent care or PCP if needs to be seen sooner.      Vanessa Dooley MD

## 2022-04-22 ENCOUNTER — OFFICE VISIT (OUTPATIENT)
Dept: OBGYN CLINIC | Age: 37
End: 2022-04-22
Payer: COMMERCIAL

## 2022-04-22 VITALS
DIASTOLIC BLOOD PRESSURE: 98 MMHG | WEIGHT: 293 LBS | SYSTOLIC BLOOD PRESSURE: 137 MMHG | BODY MASS INDEX: 45.99 KG/M2 | HEIGHT: 67 IN | HEART RATE: 91 BPM

## 2022-04-22 DIAGNOSIS — N63.10 MASS OF RIGHT BREAST, UNSPECIFIED QUADRANT: Primary | ICD-10-CM

## 2022-04-22 PROCEDURE — 99213 OFFICE O/P EST LOW 20 MIN: CPT | Performed by: OBSTETRICS & GYNECOLOGY

## 2022-04-22 RX ORDER — CEPHALEXIN 500 MG/1
500 CAPSULE ORAL 4 TIMES DAILY
Qty: 40 CAPSULE | Refills: 0 | Status: SHIPPED | OUTPATIENT
Start: 2022-04-22 | End: 2022-04-28 | Stop reason: ALTCHOICE

## 2022-04-22 RX ORDER — FEXOFENADINE HCL 60 MG
TABLET ORAL
COMMUNITY
End: 2022-04-28 | Stop reason: ALTCHOICE

## 2022-04-22 NOTE — PROGRESS NOTES
[facility]  [email or web address]    Paul Jauregui MD, FACOG       OB/GYN Problem visit    Chief Complaint:   Chief Complaint   Patient presents with    Breast Problem       History of Present Illness: This is a new problem being evaluated by this provider. Protruding lump rt breast  Size of pea. Red in color. Warm to touch. Pt reports the pain wakes her up. Pt reports she first noticed the lump on Wednesday. Reports she does not think its a pimple. Feels like a knife across the inside if her breast. Reports this pain has been intermittent all month, now it is more constant. No fam hx breast cancer. She reports she has had breast ultrasounds in the past d/t fibrocystic breast tissue per pt. The patient is a 31 y.o.  female who reports having breast mass for 3 days. She reports the symptoms are has worsened. Aggravating factors include none. Alleviating factors include none. She reports no prior history of breast cancer, biopsy or abnormal mammograms. She is not breast feeding. She does not have other concerns. Last Mammogram Results:  No results found for this or any previous visit. LMP: Patient's last menstrual period was 2022. PFSH:  Past Medical History:   Diagnosis Date    Familial tremor     right hand    Headache     migraine, +aura/tunnel vision    Pap smear for cervical cancer screening 2017;  2020    Negative, HPV negative;  Negative, Hpv Negative    Routine Papanicolaou smear     Negative per pt. Past Surgical History:   Procedure Laterality Date    HX WISDOM TEETH EXTRACTION  2017     Family History   Problem Relation Age of Onset    Colon Cancer Maternal Grandfather     Diabetes Paternal Grandmother      Social History     Tobacco Use    Smoking status: Never Smoker    Smokeless tobacco: Never Used    Tobacco comment: Never used vapor or e-cigs    Substance Use Topics    Alcohol use:  No  Drug use: No     Allergies   Allergen Reactions    Royce Anaphylaxis    Other Plant, Animal, Environmental Other (comments)     Dust, mold, mildew     Current Outpatient Medications   Medication Sig    fexofenadine (ALLEGRA) 60 mg tablet Take  by mouth.  cephALEXin (KEFLEX) 500 mg capsule Take 1 Capsule by mouth four (4) times daily for 10 days.  tranexamic acid (LYSTEDA) 650 mg tab tablet Take 2 Tablets by mouth three (3) times daily. (Patient not taking: Reported on 4/22/2022)    ibuprofen (MOTRIN) 800 mg tablet Take 1 Tablet by mouth every eight (8) hours as needed for Pain. For up to five days. No current facility-administered medications for this visit. Review of Systems:  History obtained from the patient  Constitutional: negative for fevers, chills and weight loss  ENT ROS: negative for - hearing change, oral lesions or visual changes  Respiratory: negative for cough, wheezing or dyspnea on exertion  Cardiovascular: negative for chest pain, irregular heart beats, exertional chest pressure/discomfort  Gastrointestinal: negative for dysphagia, nausea and vomiting  Genito-Urinary ROS: no dysuria, trouble voiding, or hematuria  Inteument/breast: see HPI  Musculoskeletal:negative for stiff joints, neck pain and muscle weakness  Endocrine ROS: negative for - breast changes, galactorrhea or temperature intolerance  Hematological and Lymphatic ROS: negative for - blood clots, bruising or swollen lymph nodes    Physical Exam:  Visit Vitals  BP (!) 137/98   Pulse 91   Ht 5' 7\" (1.702 m)   Wt 302 lb (137 kg)   BMI 47.30 kg/m²       GENERAL: alert, well appearing, and in no distress  HEAD: normocephalic, atraumatic.    NECK:  supple, no significant adenopathy, no palpable masses  PULM: clear to auscultation, no wheezes, rales or rhonchi, symmetric air entry   COR: normal rate and regular rhythm, S1 and S2 normal   BACK: no cvat  ABDOMEN: soft, nontender, nondistended, no masses or organomegaly BREAST:   Right breast appear normal, no suspicious masses, no skin or nipple changes or axillary nodes. Left breast appear normal, no suspicious masses, no skin or nipple changes or axillary nodes  NEURO: alert, oriented, normal speech  SKIN: no breast skin changes    Assessment:  Encounter Diagnoses   Name Primary?  Mass of right breast, unspecified quadrant Yes      ? Inflamed sebaceous cysts vs dermatitis vs small abscess    Plan:  The patient is advised that she should contact the office if she does not note improvement or if symptoms recur. She should contact our office with any questions or concerns. She could keep her routine annual exam appointment. We reviewed self breast exams with the patient. We recommend follow up with PCP for non-gynecologic complaints and chronic medical problems. rx sent  Warm compress tid-qid  Rec breast fu if NI: ? Drainage, can hold if sx resolve with abx    On this date, 4/22/2022,  I have spent 20 minutes reviewing previous notes, test results and face to face with the patient discussing the diagnosis and importance of compliance with the treatment plan as well as documenting on the day of the visit.       Orders Placed This Encounter    REFERRAL TO BREAST SURGERY    cephALEXin (KEFLEX) 500 mg capsule

## 2022-04-28 ENCOUNTER — OFFICE VISIT (OUTPATIENT)
Dept: SURGERY | Age: 37
End: 2022-04-28
Payer: COMMERCIAL

## 2022-04-28 VITALS — WEIGHT: 285 LBS | BODY MASS INDEX: 44.73 KG/M2 | HEIGHT: 67 IN

## 2022-04-28 DIAGNOSIS — L72.3 INFECTED SEBACEOUS CYST: Primary | ICD-10-CM

## 2022-04-28 DIAGNOSIS — L08.9 INFECTED SEBACEOUS CYST: Primary | ICD-10-CM

## 2022-04-28 PROCEDURE — 99202 OFFICE O/P NEW SF 15 MIN: CPT | Performed by: SURGERY

## 2022-04-28 PROCEDURE — 76642 ULTRASOUND BREAST LIMITED: CPT | Performed by: SURGERY

## 2022-04-28 NOTE — LETTER
4/28/2022    Patient: Luisito Aldana   YOB: 1985   Date of Visit: 4/28/2022     Yan Zeng MD  [address]  Via In Basket    Dear Yan Zeng MD,      Thank you for referring Ms. Luisito Aldana to [facility] for evaluation. My notes for this consultation are attached. If you have questions, please do not hesitate to call me. I look forward to following your patient along with you.       Sincerely,    Cayden Garcia MD

## 2022-04-28 NOTE — PROGRESS NOTES
HISTORY OF PRESENT ILLNESS  Jeff Flores is a 31 y.o. female. HPI ESTABLISHED Patient here for RIGHT breast mass. First noticed it 1 week ago. Started on Keflex. Site drained spontaneously 2 days ago, decreased swelling. The area is still sore to touch. Family history-   No family history of breast or ovarian cancer    Breast imaging-   None   Past Medical History:   Diagnosis Date    Familial tremor     right hand    Headache     migraine, +aura/tunnel vision    Pap smear for cervical cancer screening 2017;  2020    Negative, HPV negative;  Negative, Hpv Negative    Routine Papanicolaou smear     Negative per pt. Past Surgical History:   Procedure Laterality Date    HX WISDOM TEETH EXTRACTION  2017      OB History        1    Para   1    Term   1            AB        Living   1       SAB        IAB        Ectopic        Molar        Multiple        Live Births   1          Obstetric Comments   Menarche 8, LMP 21, # of children 1, age of 4st delivery 29, Hysterectomy/oophorectomy no/no, Breast bx no, history of breast feeding yes, BCP yes, Hormone therapy no           Family History   Problem Relation Age of Onset    Colon Cancer Maternal Grandfather     Diabetes Paternal Grandmother      Social History     Tobacco Use    Smoking status: Never Smoker    Smokeless tobacco: Never Used    Tobacco comment: Never used vapor or e-cigs    Substance Use Topics    Alcohol use: No      Prior to Admission medications    Medication Sig Start Date End Date Taking? Authorizing Provider   fexofenadine (ALLEGRA) 60 mg tablet Take  by mouth. Provider, Finn   cephALEXin (KEFLEX) 500 mg capsule Take 1 Capsule by mouth four (4) times daily for 10 days. Patient not taking: Reported on 2022  Jerel Bailey MD   tranexamic acid (LYSTEDA) 650 mg tab tablet Take 2 Tablets by mouth three (3) times daily.   Patient not taking: Reported on 4/22/2022 11/11/21   Jude Perez MD   ibuprofen (MOTRIN) 800 mg tablet Take 1 Tablet by mouth every eight (8) hours as needed for Pain. For up to five days. 10/13/21   Jude Perez MD      Allergies   Allergen Reactions    Traver Anaphylaxis    Other Plant, Animal, Environmental Other (comments)     Dust, mold, mildew           ROS    Physical Exam  Chest:   Breasts: Breasts are symmetrical.      Right: Skin change (1cm erythematous skin lesion 3:00 3/3.  no underlyilng mass) present. No inverted nipple, mass, nipple discharge, tenderness (pt reports pain under the lesion, radiates to the lateral breast), axillary adenopathy or supraclavicular adenopathy. Left: No inverted nipple, mass, nipple discharge, skin change, tenderness, axillary adenopathy or supraclavicular adenopathy. Lymphadenopathy:      Upper Body:      Right upper body: No supraclavicular or axillary adenopathy. Left upper body: No supraclavicular or axillary adenopathy. BREAST ULTRASOUND  Indication: RIGHT breast skin lesion medial breast 3:00  Technique: The RIGHT breast was scanned using a high-frequency linear-array near-field transducer  Findings: No residual abscess. Lesion is confined to the skin. No breast mass. Impression: sebaceous cyst, drained spontaneously   Disposition: No worrisome finding on ultrasound  ASSESSMENT and PLAN    ICD-10-CM ICD-9-CM    1. Infected sebaceous cyst  L72.3 706.2     L08.9       Total time spent with patient: 20 minutes     RIGHT breast infected sebaceous cyst, drained spontaneously. No underlying mass. Should resolve completely  Pt has pain under the lesion. This should resolve as inflammation subsides. Start mammograms age 38.

## 2024-07-01 ENCOUNTER — OFFICE VISIT (OUTPATIENT)
Age: 39
End: 2024-07-01
Payer: COMMERCIAL

## 2024-07-01 VITALS — WEIGHT: 293 LBS | BODY MASS INDEX: 46.2 KG/M2 | SYSTOLIC BLOOD PRESSURE: 137 MMHG | DIASTOLIC BLOOD PRESSURE: 82 MMHG

## 2024-07-01 DIAGNOSIS — Z12.4 CERVICAL CANCER SCREENING: ICD-10-CM

## 2024-07-01 DIAGNOSIS — N92.4 EXCESSIVE BLEEDING IN PREMENOPAUSAL PERIOD: ICD-10-CM

## 2024-07-01 DIAGNOSIS — R68.89 HEAT INTOLERANCE: ICD-10-CM

## 2024-07-01 DIAGNOSIS — R45.86 MOOD CHANGE: ICD-10-CM

## 2024-07-01 DIAGNOSIS — Z01.419 ENCOUNTER FOR GYNECOLOGICAL EXAMINATION: Primary | ICD-10-CM

## 2024-07-01 DIAGNOSIS — G47.9 SLEEP TROUBLE: ICD-10-CM

## 2024-07-01 DIAGNOSIS — Z11.51 ENCOUNTER FOR SCREENING FOR HUMAN PAPILLOMAVIRUS (HPV): ICD-10-CM

## 2024-07-01 LAB
ERYTHROCYTE [DISTWIDTH] IN BLOOD BY AUTOMATED COUNT: 12 % (ref 11.5–14.5)
HCT VFR BLD AUTO: 40.2 % (ref 35–47)
HGB BLD-MCNC: 13.8 G/DL (ref 11.5–16)
MCH RBC QN AUTO: 33.7 PG (ref 26–34)
MCHC RBC AUTO-ENTMCNC: 34.3 G/DL (ref 30–36.5)
MCV RBC AUTO: 98 FL (ref 80–99)
NRBC # BLD: 0 K/UL (ref 0–0.01)
NRBC BLD-RTO: 0 PER 100 WBC
PLATELET # BLD AUTO: 343 K/UL (ref 150–400)
PMV BLD AUTO: 10.2 FL (ref 8.9–12.9)
RBC # BLD AUTO: 4.1 M/UL (ref 3.8–5.2)
TSH SERPL DL<=0.05 MIU/L-ACNC: 1.44 UIU/ML (ref 0.36–3.74)
WBC # BLD AUTO: 10.4 K/UL (ref 3.6–11)

## 2024-07-01 PROCEDURE — 99459 PELVIC EXAMINATION: CPT | Performed by: OBSTETRICS & GYNECOLOGY

## 2024-07-01 PROCEDURE — 99395 PREV VISIT EST AGE 18-39: CPT | Performed by: OBSTETRICS & GYNECOLOGY

## 2024-07-01 RX ORDER — DROSPIRENONE 4 MG/1
1 TABLET, FILM COATED ORAL DAILY
Qty: 90 TABLET | Refills: 0 | Status: SHIPPED | OUTPATIENT
Start: 2024-07-01

## 2024-07-01 NOTE — PROGRESS NOTES
Alexa Leyva is a 38 y.o. female returns for an annual exam     Chief Complaint   Patient presents with    Annual Exam       No LMP recorded.  Her periods are irregular in flow that ranges from light to heavy and irregular in timing  She has dysmenorrhea.  Problems: problems - c/o headaches, increased moodiness  Birth Control: none.  Last Pap: see report obtained 3 year(s) ago.  She does not have a history of VESTA 2, 3 or cervical cancer.         1. Have you been to the ER, urgent care clinic, or hospitalized since your last visit? No    2. Have you seen or consulted any other health care providers outside of the [facility] since your last visit? No    Examination chaperoned by Portia Kim LPN.  
present, nipple appearance normal, no skin retraction present  Palpation of Breasts and Axillae: no masses present on palpation, no breast tenderness  Axillary Lymph Nodes: no lymphadenopathy present    Gastrointestinal  Abdominal Examination: abdomen non-tender to palpation, no masses present    Genitourinary  External Genitalia: normal appearance for age  Vagina: normal vaginal vault, thin discharge present  Bladder: non-tender to palpation  Urethra: appears normal  Cervix: normal, non tender   Uterus: normal, non tender  Adnexa: no adnexal tenderness present, no adnexal masses present  Perineum: normal appearing  Anus: normal appearing    Skin  General Inspection: no significant rash    Neurologic/Psychiatric  Mental Status:  Orientation: grossly oriented and alert  Mood and Affect: mood normal, affect appropriate    Assessment:  38 y.o. No obstetric history on file. for annual gynecologic exam.  Encounter Diagnoses   Name Primary?    Encounter for gynecological examination Yes    Cervical cancer screening     Encounter for screening for human papillomavirus (HPV)     Heat intolerance     Mood change     Sleep trouble     Excessive bleeding in premenopausal period        Plan:  Recommend follow up one year for routine annual gynecologic exam or sooner as needed for any GYN concerns.  Patient should follow up with a primary care physician for chronic medical problems and evaluation of non-gynecologic concerns.  STD testing recommended per CDC guidelines and at patient request.   Follow up: annual gynecologic exam one year.  Labs  We discussed potential increase risks with Slynd and interaction with other medications and potassium levels.   OCP fu /sx fu ~ 3mos  Disc rva pharmacy use  Reviewed breast consult, and disc routine breast screening starting at 39 yo      Folllow up:  [x] return for annual well woman exam in one year or sooner if the patient is having problems  [] follow up and ultrasound  [] 6 months  []

## 2024-07-04 LAB
CYTOLOGIST CVX/VAG CYTO: NORMAL
CYTOLOGY CVX/VAG DOC CYTO: NORMAL
CYTOLOGY CVX/VAG DOC THIN PREP: NORMAL
DX ICD CODE: NORMAL
HPV GENOTYPE REFLEX: NORMAL
HPV I/H RISK 4 DNA CVX QL PROBE+SIG AMP: NEGATIVE
Lab: NORMAL
Lab: NORMAL
OTHER STN SPEC: NORMAL
STAT OF ADQ CVX/VAG CYTO-IMP: NORMAL

## 2024-07-04 NOTE — RESULT ENCOUNTER NOTE
Pap wnl  Please update chart/history/prenatal labs, if needed.    message sent if active.   38 y.o.